# Patient Record
Sex: MALE | Race: WHITE | NOT HISPANIC OR LATINO
[De-identification: names, ages, dates, MRNs, and addresses within clinical notes are randomized per-mention and may not be internally consistent; named-entity substitution may affect disease eponyms.]

---

## 2017-02-21 ENCOUNTER — HOSPITAL (OUTPATIENT)
Dept: OTHER | Age: 67
End: 2017-02-21
Attending: OPHTHALMOLOGY

## 2017-02-28 ENCOUNTER — CHARTING TRANS (OUTPATIENT)
Dept: OTHER | Age: 67
End: 2017-02-28

## 2017-03-07 ENCOUNTER — CHARTING TRANS (OUTPATIENT)
Dept: OTHER | Age: 67
End: 2017-03-07

## 2021-07-13 ENCOUNTER — OFFICE VISIT (OUTPATIENT)
Dept: INTERNAL MEDICINE CLINIC | Facility: CLINIC | Age: 71
End: 2021-07-13
Payer: MEDICARE

## 2021-07-13 VITALS
SYSTOLIC BLOOD PRESSURE: 124 MMHG | HEART RATE: 58 BPM | TEMPERATURE: 99 F | WEIGHT: 264 LBS | RESPIRATION RATE: 18 BRPM | BODY MASS INDEX: 36.15 KG/M2 | HEIGHT: 71.5 IN | DIASTOLIC BLOOD PRESSURE: 76 MMHG | OXYGEN SATURATION: 97 %

## 2021-07-13 DIAGNOSIS — I71.4 ABDOMINAL AORTIC ANEURYSM (AAA) WITHOUT RUPTURE (HCC): ICD-10-CM

## 2021-07-13 DIAGNOSIS — Z12.5 PROSTATE CANCER SCREENING: ICD-10-CM

## 2021-07-13 DIAGNOSIS — M19.041 PRIMARY OSTEOARTHRITIS OF RIGHT HAND: ICD-10-CM

## 2021-07-13 DIAGNOSIS — G47.33 OBSTRUCTIVE SLEEP APNEA SYNDROME: ICD-10-CM

## 2021-07-13 DIAGNOSIS — I48.0 PAROXYSMAL ATRIAL FIBRILLATION (HCC): ICD-10-CM

## 2021-07-13 DIAGNOSIS — E78.2 MIXED HYPERLIPIDEMIA: ICD-10-CM

## 2021-07-13 DIAGNOSIS — Z00.00 ANNUAL PHYSICAL EXAM: Primary | ICD-10-CM

## 2021-07-13 DIAGNOSIS — I25.10 ATHEROSCLEROSIS OF NATIVE CORONARY ARTERY OF NATIVE HEART WITHOUT ANGINA PECTORIS: ICD-10-CM

## 2021-07-13 DIAGNOSIS — Z01.84 IMMUNITY STATUS TESTING: ICD-10-CM

## 2021-07-13 DIAGNOSIS — Z12.11 COLON CANCER SCREENING: ICD-10-CM

## 2021-07-13 DIAGNOSIS — I10 ESSENTIAL HYPERTENSION: ICD-10-CM

## 2021-07-13 DIAGNOSIS — Z00.00 ENCOUNTER FOR ANNUAL HEALTH EXAMINATION: ICD-10-CM

## 2021-07-13 DIAGNOSIS — H53.9 VISION CHANGES: ICD-10-CM

## 2021-07-13 DIAGNOSIS — M17.0 PRIMARY OSTEOARTHRITIS OF BOTH KNEES: ICD-10-CM

## 2021-07-13 PROBLEM — I71.40 ABDOMINAL AORTIC ANEURYSM (AAA): Status: ACTIVE | Noted: 2017-01-01

## 2021-07-13 PROBLEM — E78.5 HYPERLIPIDEMIA: Status: ACTIVE | Noted: 2020-03-05

## 2021-07-13 PROBLEM — I71.40 ABDOMINAL AORTIC ANEURYSM (AAA) (HCC): Status: ACTIVE | Noted: 2017-01-01

## 2021-07-13 PROCEDURE — G0439 PPPS, SUBSEQ VISIT: HCPCS | Performed by: INTERNAL MEDICINE

## 2021-07-13 RX ORDER — TRAMADOL HYDROCHLORIDE 50 MG/1
50 TABLET ORAL EVERY 6 HOURS PRN
COMMUNITY
Start: 2021-06-15 | End: 2022-01-11

## 2021-07-13 RX ORDER — ZALEPLON 5 MG/1
5 CAPSULE ORAL NIGHTLY PRN
COMMUNITY
Start: 2021-04-19

## 2021-07-13 RX ORDER — MELOXICAM 15 MG/1
15 TABLET ORAL DAILY
COMMUNITY
Start: 2020-12-31

## 2021-07-13 RX ORDER — HYDRALAZINE HYDROCHLORIDE 25 MG/1
25 TABLET, FILM COATED ORAL 3 TIMES DAILY
COMMUNITY
Start: 2021-06-30

## 2021-07-13 RX ORDER — ATORVASTATIN CALCIUM 40 MG/1
40 TABLET, FILM COATED ORAL NIGHTLY
COMMUNITY
Start: 2021-05-26

## 2021-07-13 RX ORDER — HYDROCHLOROTHIAZIDE 50 MG/1
50 TABLET ORAL DAILY
COMMUNITY
Start: 2019-08-29

## 2021-07-13 RX ORDER — LISINOPRIL 40 MG/1
40 TABLET ORAL NIGHTLY
COMMUNITY
Start: 2019-08-29

## 2021-07-13 NOTE — PROGRESS NOTES
HPI:   Ken Gipson is a 73year old male who presents for a Medicare Subsequent Annual Wellness visit (Pt already had Initial Annual Wellness).     HPI:  Here for AWV  New to me  Recent moved from [facility]  Has no complaints  Needs to establish with ca patient and Family/surrogate (if present), and forms available to patient in AVS     He does NOT have a Power of  for [employer] on file in [facility]    Advance care planning including the explanation and discussion of advance directives standard forms daily.  hydrochlorothiazide 50 MG Oral Tab, Take 50 mg by mouth daily. apixaban 5 MG Oral Tab, Take by mouth 2 (two) times daily. hydrALAzine HCl 25 MG Oral Tab,   Meloxicam 15 MG Oral Tab, Take 15 mg by mouth daily.   Zaleplon 5 MG Oral Cap, Take 5 mg by Head:  Normocephalic, without obvious abnormality, atraumatic   Eyes:  PERRL, conjunctiva/corneas clear, EOM's intact, both eyes   Ears:  Normal TM's and external ear canals, both ears   Nose: Nares normal, septum midline, mucosa normal, no drainage or s exam    Primary osteoarthritis of right hand    Paroxysmal atrial fibrillation (HCC)  -     CARDIO - INTERNAL    Abdominal aortic aneurysm (AAA) without rupture (HCC)    Atherosclerosis of native coronary artery of native heart without angina pectoris  - lifestyle, and exercise. Return in about 6 months (around 1/13/2022).      Alma Koo MD, 7/13/2021     General Health     In the past six months, have you lost more than 10 pounds without trying?: 1 - Yes  Has your appetite been poor?: No  How does t -    Colonoscopy Never done    Flexible Sigmoidoscopy   Covered every 4 years -    Fecal Occult Blood Test Covered annually -   Prostate Cancer Screening    Prostate-Specific Antigen (PSA) Annually No results found for: PSA  PSA Never done   Immunizations

## 2021-07-13 NOTE — PATIENT INSTRUCTIONS
Risk Factors for Heart Disease   Heart disease includes coronary artery disease which involves damage to the heart arteries. It also includes congestive heart failure and other heart issues.  The coronary arteries provide the oxygen your heart needs to pu partially hydrogenated oils    Replace less healthy foods by eating a diet with a lot of:   · Fruits  · Legumes  · Vegetables  · Whole grains  · Nuts  · Lean fish or lean animal protein    Drinking too much alcohol also raises the risk for heart disease.  I risk of heart disease, stroke, and diabetes.  You have metabolic syndrome if you have 3 or more of these:   · Low HDL cholesterol  · High triglycerides  · High blood pressure  · High blood sugar  · Extra weight around the waist    Diabetes  Diabetes occurs intended as a substitute for professional medical care. Always follow your healthcare professional's instructions.         Jose A Llanos's SCREENING SCHEDULE   Tests on this list are recommended by your physician but may not be covered, or covered at this fr covered once after 65 Prevnar 13: 08/22/2017    [id number]: 09/28/2018     No recommendations at this time    Hepatitis B One screening covered for patients with certain risk factors   -  No recommendations at this time    Tetanus Toxoid Not covered by Me

## 2021-07-19 ENCOUNTER — LAB ENCOUNTER (OUTPATIENT)
Dept: LAB | Age: 71
End: 2021-07-19
Attending: INTERNAL MEDICINE
Payer: MEDICARE

## 2021-07-19 ENCOUNTER — TELEPHONE (OUTPATIENT)
Dept: ORTHOPEDICS CLINIC | Facility: CLINIC | Age: 71
End: 2021-07-19

## 2021-07-19 DIAGNOSIS — M25.562 PAIN IN BOTH KNEES, UNSPECIFIED CHRONICITY: Primary | ICD-10-CM

## 2021-07-19 DIAGNOSIS — M25.561 PAIN IN BOTH KNEES, UNSPECIFIED CHRONICITY: Primary | ICD-10-CM

## 2021-07-19 DIAGNOSIS — Z12.5 PROSTATE CANCER SCREENING: ICD-10-CM

## 2021-07-19 DIAGNOSIS — I10 ESSENTIAL HYPERTENSION: ICD-10-CM

## 2021-07-19 DIAGNOSIS — Z01.84 IMMUNITY STATUS TESTING: ICD-10-CM

## 2021-07-19 DIAGNOSIS — E78.2 MIXED HYPERLIPIDEMIA: ICD-10-CM

## 2021-07-19 PROBLEM — Z78.9 HEPATITIS C ANTIBODY TEST NEGATIVE: Status: ACTIVE | Noted: 2021-07-19

## 2021-07-19 PROBLEM — E03.8 SUBCLINICAL HYPOTHYROIDISM: Status: ACTIVE | Noted: 2021-07-19

## 2021-07-19 LAB
ALBUMIN SERPL-MCNC: 3.9 G/DL (ref 3.4–5)
ALBUMIN/GLOB SERPL: 1.3 {RATIO} (ref 1–2)
ALP LIVER SERPL-CCNC: 61 U/L
ALT SERPL-CCNC: 40 U/L
ANION GAP SERPL CALC-SCNC: 3 MMOL/L (ref 0–18)
AST SERPL-CCNC: 29 U/L (ref 15–37)
BASOPHILS # BLD AUTO: 0.04 X10(3) UL (ref 0–0.2)
BASOPHILS NFR BLD AUTO: 0.7 %
BILIRUB SERPL-MCNC: 0.9 MG/DL (ref 0.1–2)
BILIRUB UR QL STRIP.AUTO: NEGATIVE
BUN BLD-MCNC: 24 MG/DL (ref 7–18)
BUN/CREAT SERPL: 26.1 (ref 10–20)
CALCIUM BLD-MCNC: 8.7 MG/DL (ref 8.5–10.1)
CHLORIDE SERPL-SCNC: 106 MMOL/L (ref 98–112)
CHOLEST SMN-MCNC: 123 MG/DL (ref ?–200)
CLARITY UR REFRACT.AUTO: CLEAR
CO2 SERPL-SCNC: 30 MMOL/L (ref 21–32)
COLOR UR AUTO: YELLOW
COMPLEXED PSA SERPL-MCNC: 0.43 NG/ML (ref ?–4)
CREAT BLD-MCNC: 0.92 MG/DL
DEPRECATED RDW RBC AUTO: 42.7 FL (ref 35.1–46.3)
EOSINOPHIL # BLD AUTO: 0.3 X10(3) UL (ref 0–0.7)
EOSINOPHIL NFR BLD AUTO: 5.2 %
ERYTHROCYTE [DISTWIDTH] IN BLOOD BY AUTOMATED COUNT: 12.5 % (ref 11–15)
GLOBULIN PLAS-MCNC: 3.1 G/DL (ref 2.8–4.4)
GLUCOSE BLD-MCNC: 96 MG/DL (ref 70–99)
GLUCOSE UR STRIP.AUTO-MCNC: NEGATIVE MG/DL
HCT VFR BLD AUTO: 43.1 %
HCV AB SERPL QL IA: NONREACTIVE
HDLC SERPL-MCNC: 54 MG/DL (ref 40–59)
HGB BLD-MCNC: 14.9 G/DL
IMM GRANULOCYTES # BLD AUTO: 0.01 X10(3) UL (ref 0–1)
IMM GRANULOCYTES NFR BLD: 0.2 %
KETONES UR STRIP.AUTO-MCNC: NEGATIVE MG/DL
LDLC SERPL CALC-MCNC: 55 MG/DL (ref ?–100)
LEUKOCYTE ESTERASE UR QL STRIP.AUTO: NEGATIVE
LYMPHOCYTES # BLD AUTO: 2.3 X10(3) UL (ref 1–4)
LYMPHOCYTES NFR BLD AUTO: 39.7 %
M PROTEIN MFR SERPL ELPH: 7 G/DL (ref 6.4–8.2)
MCH RBC QN AUTO: 32 PG (ref 26–34)
MCHC RBC AUTO-ENTMCNC: 34.6 G/DL (ref 31–37)
MCV RBC AUTO: 92.5 FL
MONOCYTES # BLD AUTO: 0.85 X10(3) UL (ref 0.1–1)
MONOCYTES NFR BLD AUTO: 14.7 %
NEUTROPHILS # BLD AUTO: 2.3 X10 (3) UL (ref 1.5–7.7)
NEUTROPHILS # BLD AUTO: 2.3 X10(3) UL (ref 1.5–7.7)
NEUTROPHILS NFR BLD AUTO: 39.5 %
NITRITE UR QL STRIP.AUTO: NEGATIVE
NONHDLC SERPL-MCNC: 69 MG/DL (ref ?–130)
OSMOLALITY SERPL CALC.SUM OF ELEC: 292 MOSM/KG (ref 275–295)
PATIENT FASTING Y/N/NP: YES
PATIENT FASTING Y/N/NP: YES
PH UR STRIP.AUTO: 6 [PH] (ref 5–8)
PLATELET # BLD AUTO: 155 10(3)UL (ref 150–450)
POTASSIUM SERPL-SCNC: 3.9 MMOL/L (ref 3.5–5.1)
PROT UR STRIP.AUTO-MCNC: NEGATIVE MG/DL
RBC # BLD AUTO: 4.66 X10(6)UL
RBC UR QL AUTO: NEGATIVE
SODIUM SERPL-SCNC: 139 MMOL/L (ref 136–145)
SP GR UR STRIP.AUTO: 1.03 (ref 1–1.03)
T4 FREE SERPL-MCNC: 0.8 NG/DL (ref 0.8–1.7)
TRIGL SERPL-MCNC: 69 MG/DL (ref 30–149)
TSI SER-ACNC: 4.02 MIU/ML (ref 0.36–3.74)
UROBILINOGEN UR STRIP.AUTO-MCNC: <2 MG/DL
VLDLC SERPL CALC-MCNC: 10 MG/DL (ref 0–30)
WBC # BLD AUTO: 5.8 X10(3) UL (ref 4–11)

## 2021-07-19 PROCEDURE — 85025 COMPLETE CBC W/AUTO DIFF WBC: CPT

## 2021-07-19 PROCEDURE — 82088 ASSAY OF ALDOSTERONE: CPT

## 2021-07-19 PROCEDURE — 84244 ASSAY OF RENIN: CPT

## 2021-07-19 PROCEDURE — 84443 ASSAY THYROID STIM HORMONE: CPT

## 2021-07-19 PROCEDURE — 36415 COLL VENOUS BLD VENIPUNCTURE: CPT

## 2021-07-19 PROCEDURE — 84439 ASSAY OF FREE THYROXINE: CPT

## 2021-07-19 PROCEDURE — 80061 LIPID PANEL: CPT

## 2021-07-19 PROCEDURE — 81001 URINALYSIS AUTO W/SCOPE: CPT

## 2021-07-19 PROCEDURE — 80053 COMPREHEN METABOLIC PANEL: CPT

## 2021-07-19 PROCEDURE — 86803 HEPATITIS C AB TEST: CPT

## 2021-07-19 NOTE — TELEPHONE ENCOUNTER
Patient has an appointment scheduled with Dr. Rhett Looney on 8/16 for bilateral knee issue. Will patient need imaging prior?

## 2021-07-21 ENCOUNTER — HOSPITAL ENCOUNTER (OUTPATIENT)
Dept: GENERAL RADIOLOGY | Age: 71
Discharge: HOME | End: 2021-07-21
Attending: ORTHOPAEDIC SURGERY
Payer: MEDICARE

## 2021-07-21 DIAGNOSIS — M25.562 PAIN IN BOTH KNEES, UNSPECIFIED CHRONICITY: ICD-10-CM

## 2021-07-21 DIAGNOSIS — M25.561 PAIN IN BOTH KNEES, UNSPECIFIED CHRONICITY: ICD-10-CM

## 2021-07-21 PROCEDURE — 73564 X-RAY EXAM KNEE 4 OR MORE: CPT | Performed by: ORTHOPAEDIC SURGERY

## 2021-07-29 LAB
ALDOSTERONE/RENIN ACTIVITY RATIO: 6.7 RATIO
ALDOSTERONE: 10.1 NG/DL
RENIN ACTIVITY: 1.5 NG/ML/HR

## 2021-08-16 ENCOUNTER — OFFICE VISIT (OUTPATIENT)
Dept: ORTHOPEDICS CLINIC | Facility: CLINIC | Age: 71
End: 2021-08-16
Payer: MEDICARE

## 2021-08-16 DIAGNOSIS — M17.0 PRIMARY OSTEOARTHRITIS OF BOTH KNEES: Primary | ICD-10-CM

## 2021-08-16 PROCEDURE — 99203 OFFICE O/P NEW LOW 30 MIN: CPT | Performed by: ORTHOPAEDIC SURGERY

## 2021-08-16 RX ORDER — VIT A/VIT C/VIT E/ZINC/COPPER 2148-113
TABLET ORAL
COMMUNITY

## 2021-08-16 NOTE — PROGRESS NOTES
[facility] New Patient Note    CC: Patient presents with:  Knee Pain: Patient is here today for bilateral knee pain, that has been present for about 10-15 years now had knee scope on right knee and orthovisc injections bilateral.       HPI: The SWELLING  Penicillins             SWELLING  Rivaroxaban             HIVES, ITCHING    Comment:Hives on legs             Hives on legs             Hives on legs             Hives on legs  Vancomycin              ITCHING, HALLUCINATION, OTHER (SEE is seen. Assessment/Diagnoses:  Diagnoses and all orders for this visit:    Primary osteoarthritis of both knees  -     [facility] FOR [facility] CARE AUTH        Plan:  I reviewed imaging and exam findings with the patient.   He presents with bello

## 2021-08-19 ENCOUNTER — TELEPHONE (OUTPATIENT)
Dept: ORTHOPEDICS CLINIC | Facility: CLINIC | Age: 71
End: 2021-08-19

## 2021-08-19 NOTE — TELEPHONE ENCOUNTER
Called and spoke with Antonette Banks to inform him that we were able to get him approved for monovisc  through his insurance. I also offer to get him set up with an appointment to have the procedure done.  I was able to get him in on 8/30/21 @10 am.

## 2021-08-25 ENCOUNTER — HOSPITAL ENCOUNTER (EMERGENCY)
Age: 71
Discharge: HOME | End: 2021-08-25
Attending: EMERGENCY MEDICINE
Payer: MEDICARE

## 2021-08-25 ENCOUNTER — APPOINTMENT (OUTPATIENT)
Dept: GENERAL RADIOLOGY | Age: 71
End: 2021-08-25
Attending: EMERGENCY MEDICINE
Payer: MEDICARE

## 2021-08-25 VITALS
HEART RATE: 40 BPM | OXYGEN SATURATION: 98 % | HEIGHT: 74 IN | TEMPERATURE: 97 F | WEIGHT: 265 LBS | DIASTOLIC BLOOD PRESSURE: 64 MMHG | BODY MASS INDEX: 34.01 KG/M2 | RESPIRATION RATE: 16 BRPM | SYSTOLIC BLOOD PRESSURE: 151 MMHG

## 2021-08-25 DIAGNOSIS — M54.50 ACUTE BILATERAL LOW BACK PAIN WITHOUT SCIATICA: Primary | ICD-10-CM

## 2021-08-25 DIAGNOSIS — K42.9 UMBILICAL HERNIA WITHOUT OBSTRUCTION AND WITHOUT GANGRENE: ICD-10-CM

## 2021-08-25 LAB
ALBUMIN SERPL-MCNC: 4 G/DL (ref 3.4–5)
ALBUMIN/GLOB SERPL: 1.2 {RATIO} (ref 1–2)
ALP LIVER SERPL-CCNC: 74 U/L
ALT SERPL-CCNC: 35 U/L
ANION GAP SERPL CALC-SCNC: 2 MMOL/L (ref 0–18)
AST SERPL-CCNC: 25 U/L (ref 15–37)
BASOPHILS # BLD AUTO: 0.03 X10(3) UL (ref 0–0.2)
BASOPHILS NFR BLD AUTO: 0.5 %
BILIRUB SERPL-MCNC: 0.6 MG/DL (ref 0.1–2)
BILIRUB UR QL STRIP.AUTO: NEGATIVE
BUN BLD-MCNC: 28 MG/DL (ref 7–18)
CALCIUM BLD-MCNC: 8.9 MG/DL (ref 8.5–10.1)
CHLORIDE SERPL-SCNC: 106 MMOL/L (ref 98–112)
CLARITY UR REFRACT.AUTO: CLEAR
CO2 SERPL-SCNC: 29 MMOL/L (ref 21–32)
COLOR UR AUTO: YELLOW
CREAT BLD-MCNC: 1.07 MG/DL
EOSINOPHIL # BLD AUTO: 0.31 X10(3) UL (ref 0–0.7)
EOSINOPHIL NFR BLD AUTO: 5.5 %
ERYTHROCYTE [DISTWIDTH] IN BLOOD BY AUTOMATED COUNT: 12.2 %
GLOBULIN PLAS-MCNC: 3.4 G/DL (ref 2.8–4.4)
GLUCOSE BLD-MCNC: 89 MG/DL (ref 70–99)
GLUCOSE UR STRIP.AUTO-MCNC: NEGATIVE MG/DL
HCT VFR BLD AUTO: 44.1 %
HGB BLD-MCNC: 15 G/DL
IMM GRANULOCYTES # BLD AUTO: 0.01 X10(3) UL (ref 0–1)
IMM GRANULOCYTES NFR BLD: 0.2 %
KETONES UR STRIP.AUTO-MCNC: NEGATIVE MG/DL
LEUKOCYTE ESTERASE UR QL STRIP.AUTO: NEGATIVE
LIPASE SERPL-CCNC: 377 U/L (ref 73–393)
LYMPHOCYTES # BLD AUTO: 1.94 X10(3) UL (ref 1–4)
LYMPHOCYTES NFR BLD AUTO: 34.6 %
M PROTEIN MFR SERPL ELPH: 7.4 G/DL (ref 6.4–8.2)
MCH RBC QN AUTO: 31.4 PG (ref 26–34)
MCHC RBC AUTO-ENTMCNC: 34 G/DL (ref 31–37)
MCV RBC AUTO: 92.5 FL
MONOCYTES # BLD AUTO: 0.78 X10(3) UL (ref 0.1–1)
MONOCYTES NFR BLD AUTO: 13.9 %
NEUTROPHILS # BLD AUTO: 2.53 X10 (3) UL (ref 1.5–7.7)
NEUTROPHILS # BLD AUTO: 2.53 X10(3) UL (ref 1.5–7.7)
NEUTROPHILS NFR BLD AUTO: 45.3 %
NITRITE UR QL STRIP.AUTO: NEGATIVE
OSMOLALITY SERPL CALC.SUM OF ELEC: 289 MOSM/KG (ref 275–295)
PH UR STRIP.AUTO: 6.5 [PH] (ref 5–8)
PLATELET # BLD AUTO: 157 10(3)UL (ref 150–450)
POTASSIUM SERPL-SCNC: 4.3 MMOL/L (ref 3.5–5.1)
PROT UR STRIP.AUTO-MCNC: NEGATIVE MG/DL
RBC # BLD AUTO: 4.77 X10(6)UL
RBC UR QL AUTO: NEGATIVE
SODIUM SERPL-SCNC: 137 MMOL/L (ref 136–145)
SP GR UR STRIP.AUTO: 1.02 (ref 1–1.03)
UROBILINOGEN UR STRIP.AUTO-MCNC: 0.2 MG/DL
WBC # BLD AUTO: 5.6 X10(3) UL (ref 4–11)

## 2021-08-25 PROCEDURE — 85025 COMPLETE CBC W/AUTO DIFF WBC: CPT | Performed by: EMERGENCY MEDICINE

## 2021-08-25 PROCEDURE — 80053 COMPREHEN METABOLIC PANEL: CPT | Performed by: EMERGENCY MEDICINE

## 2021-08-25 PROCEDURE — 36415 COLL VENOUS BLD VENIPUNCTURE: CPT

## 2021-08-25 PROCEDURE — 72100 X-RAY EXAM L-S SPINE 2/3 VWS: CPT | Performed by: EMERGENCY MEDICINE

## 2021-08-25 PROCEDURE — 99284 EMERGENCY DEPT VISIT MOD MDM: CPT

## 2021-08-25 PROCEDURE — 81003 URINALYSIS AUTO W/O SCOPE: CPT | Performed by: EMERGENCY MEDICINE

## 2021-08-25 PROCEDURE — 83690 ASSAY OF LIPASE: CPT | Performed by: EMERGENCY MEDICINE

## 2021-08-25 RX ORDER — CYCLOBENZAPRINE HCL 5 MG
5 TABLET ORAL 3 TIMES DAILY PRN
Qty: 20 TABLET | Refills: 0 | Status: SHIPPED | OUTPATIENT
Start: 2021-08-25 | End: 2021-09-01

## 2021-08-25 NOTE — ED PROVIDER NOTES
Patient Seen in: THE [facility] In [place]      History   Patient presents with:  Abdominal Pain    Stated Complaint: abd pain     HPI/Subjective:   HPI    72-year-old male presents to the emergency department complaining of low back pain rate and rhythm. Abdomen oropharynx is normal.  Neck: No adenopathy or thyromegaly. Lungs are clear to auscultation. Is nontender. There is evidence of a reducible umbilical hernia. Nontender. Extremities are atraumatic.   Back: Some pain with movemen L5-S1. There is mild anterior spondylolisthesis L4 on L5 consistent degenerative etiology numerous endplate osteophytes are noted. No acute fracture.  DISC SPACES:  There is multiple level degenerative disc disease involving all of the lumbar disc spaces

## 2021-08-25 NOTE — ED INITIAL ASSESSMENT (HPI)
abd pain last week states he felt a \"bubble\" then Saturday c/o pain radiating to back - denies n/v

## 2021-08-30 ENCOUNTER — OFFICE VISIT (OUTPATIENT)
Dept: ORTHOPEDICS CLINIC | Facility: CLINIC | Age: 71
End: 2021-08-30
Payer: MEDICARE

## 2021-08-30 VITALS — HEIGHT: 74 IN | WEIGHT: 265 LBS | BODY MASS INDEX: 34.01 KG/M2

## 2021-08-30 DIAGNOSIS — M17.12 PRIMARY OSTEOARTHRITIS OF LEFT KNEE: ICD-10-CM

## 2021-08-30 DIAGNOSIS — M17.11 PRIMARY OSTEOARTHRITIS OF RIGHT KNEE: Primary | ICD-10-CM

## 2021-08-30 PROCEDURE — 20610 DRAIN/INJ JOINT/BURSA W/O US: CPT | Performed by: ORTHOPAEDIC SURGERY

## 2021-08-30 NOTE — PROGRESS NOTES
[facility] Follow-up Progress Note        Chief Complaint:  Right and left knee pain     History: The patient is a 73year old male who returns due to recurrent pain at the right and left knees.   The patient is diagnosed with osteoarthritis and improvement, the patient gave verbal consent to proceed. Using meticulous sterile technique I injected 4 cc of 1% Xylocaine at the lateral patellofemoral joint of the right and left knees in sequence for local anesthesia.   After attempted aspiration, I in

## 2021-09-08 ENCOUNTER — OFFICE VISIT (OUTPATIENT)
Dept: SURGERY | Facility: CLINIC | Age: 71
End: 2021-09-08
Payer: MEDICARE

## 2021-09-08 VITALS
BODY MASS INDEX: 34.01 KG/M2 | TEMPERATURE: 97 F | SYSTOLIC BLOOD PRESSURE: 153 MMHG | HEART RATE: 40 BPM | WEIGHT: 265 LBS | HEIGHT: 74 IN | DIASTOLIC BLOOD PRESSURE: 71 MMHG

## 2021-09-08 DIAGNOSIS — G47.33 OBSTRUCTIVE SLEEP APNEA SYNDROME: ICD-10-CM

## 2021-09-08 DIAGNOSIS — I10 ESSENTIAL HYPERTENSION: ICD-10-CM

## 2021-09-08 DIAGNOSIS — I71.4 ABDOMINAL AORTIC ANEURYSM (AAA) WITHOUT RUPTURE (HCC): ICD-10-CM

## 2021-09-08 DIAGNOSIS — K42.9 UMBILICAL HERNIA WITHOUT OBSTRUCTION AND WITHOUT GANGRENE: Primary | ICD-10-CM

## 2021-09-08 DIAGNOSIS — I48.0 PAROXYSMAL ATRIAL FIBRILLATION (HCC): ICD-10-CM

## 2021-09-08 PROCEDURE — 99204 OFFICE O/P NEW MOD 45 MIN: CPT | Performed by: COLON & RECTAL SURGERY

## 2021-09-08 NOTE — H&P
New Patient Visit Note       Active Problems      1. Umbilical hernia without obstruction and without gangrene    2. Abdominal aortic aneurysm (AAA) without rupture ([address]    3. Essential hypertension    4. Paroxysmal atrial fibrillation (HCC)    5.  Obstruc Alzheimer's     Social History    Tobacco Use      Smoking status: Former Smoker        Packs/day: 1.00        Years: 20.00        Pack years: 21        Quit date: 2001        Years since quittin.2      Smokeless tobacco: Never Used      Tobacc cardiac clearance and recommendations on stopping Eliquis.     The risks of hernia repair were explained and include but are not limited to intra-operative and post-operative bleeding, post-operative wound infection, hernia recurrence, injury to adjacent or

## 2021-09-09 ENCOUNTER — OFFICE VISIT (OUTPATIENT)
Dept: INTERNAL MEDICINE CLINIC | Facility: CLINIC | Age: 71
End: 2021-09-09
Payer: MEDICARE

## 2021-09-09 ENCOUNTER — TELEPHONE (OUTPATIENT)
Dept: SURGERY | Facility: CLINIC | Age: 71
End: 2021-09-09

## 2021-09-09 VITALS
OXYGEN SATURATION: 96 % | BODY MASS INDEX: 34.14 KG/M2 | HEART RATE: 52 BPM | TEMPERATURE: 98 F | HEIGHT: 74 IN | SYSTOLIC BLOOD PRESSURE: 134 MMHG | DIASTOLIC BLOOD PRESSURE: 70 MMHG | WEIGHT: 266 LBS | RESPIRATION RATE: 16 BRPM

## 2021-09-09 DIAGNOSIS — I71.4 ABDOMINAL AORTIC ANEURYSM (AAA) WITHOUT RUPTURE (HCC): ICD-10-CM

## 2021-09-09 DIAGNOSIS — K42.9 UMBILICAL HERNIA WITHOUT OBSTRUCTION OR GANGRENE: Primary | ICD-10-CM

## 2021-09-09 DIAGNOSIS — I10 ESSENTIAL HYPERTENSION: Primary | ICD-10-CM

## 2021-09-09 PROCEDURE — 99213 OFFICE O/P EST LOW 20 MIN: CPT | Performed by: INTERNAL MEDICINE

## 2021-09-09 NOTE — PROGRESS NOTES
HPI/Subjective:   Patient ID: Yael Squires is a 73year old male. HPI   Yael Squires is a 73year old male. HPI:   Patient presents for recheck of his hypertension.  Pt has been taking medications as instructed, no medication side effects, home BP mo Cataract    • Complex sleep apnea syndrome 2016   • Essential hypertension       Past Surgical History:   Procedure Laterality Date   • TONSILLECTOMY        Social History:    Social History    Tobacco Use      Smoking status: Former Smoker        Packs/da 40 MG Oral Tab Take 40 mg by mouth daily. • metoprolol tartrate 25 MG Oral Tab Take 25 mg by mouth 2 (two) times daily. • hydrochlorothiazide 50 MG Oral Tab Take 50 mg by mouth daily. • apixaban 5 MG Oral Tab Take by mouth 2 (two) times daily.

## 2021-09-10 ENCOUNTER — HOSPITAL ENCOUNTER (OUTPATIENT)
Dept: ULTRASOUND IMAGING | Age: 71
Discharge: HOME | End: 2021-09-10
Attending: INTERNAL MEDICINE
Payer: MEDICARE

## 2021-09-10 DIAGNOSIS — I71.4 ABDOMINAL AORTIC ANEURYSM (AAA) WITHOUT RUPTURE (HCC): ICD-10-CM

## 2021-09-10 PROCEDURE — 76706 US ABDL AORTA SCREEN AAA: CPT | Performed by: INTERNAL MEDICINE

## 2021-09-13 ENCOUNTER — TELEPHONE (OUTPATIENT)
Dept: INTERNAL MEDICINE CLINIC | Facility: CLINIC | Age: 71
End: 2021-09-13

## 2021-09-13 NOTE — TELEPHONE ENCOUNTER
----- Message from Ivory Ashraf MD sent at 9/10/2021 12:58 PM CDT -----  Ectasia of the abdominal aorta   Atheromatous changes of the aorta  Stable findings.  Cont statin and BP control and recheck US in 1 year

## 2021-09-30 ENCOUNTER — OFFICE VISIT (OUTPATIENT)
Dept: INTERNAL MEDICINE CLINIC | Facility: CLINIC | Age: 71
End: 2021-09-30
Payer: MEDICARE

## 2021-09-30 VITALS
TEMPERATURE: 99 F | DIASTOLIC BLOOD PRESSURE: 72 MMHG | HEART RATE: 50 BPM | BODY MASS INDEX: 34.65 KG/M2 | HEIGHT: 74 IN | SYSTOLIC BLOOD PRESSURE: 136 MMHG | OXYGEN SATURATION: 95 % | RESPIRATION RATE: 16 BRPM | WEIGHT: 270 LBS

## 2021-09-30 DIAGNOSIS — M17.0 PRIMARY OSTEOARTHRITIS OF BOTH KNEES: ICD-10-CM

## 2021-09-30 DIAGNOSIS — I48.0 PAROXYSMAL ATRIAL FIBRILLATION (HCC): ICD-10-CM

## 2021-09-30 DIAGNOSIS — I10 ESSENTIAL HYPERTENSION: ICD-10-CM

## 2021-09-30 DIAGNOSIS — G47.33 OBSTRUCTIVE SLEEP APNEA SYNDROME: ICD-10-CM

## 2021-09-30 DIAGNOSIS — Z01.818 PRE-OP EXAM: Primary | ICD-10-CM

## 2021-09-30 PROCEDURE — 99214 OFFICE O/P EST MOD 30 MIN: CPT | Performed by: NURSE PRACTITIONER

## 2021-09-30 NOTE — H&P
Maribeth Garibay is a 73year old year old male who presents for a pre-operative physical exam.  Patient is to have robotic umbilical hernia repair with mesh to be done by Dr. Kristin Du at [facility] on October 8, 2021.     HPI:    Umbilical hernia with some a temperature             Elevated temperature  Hydralazine             ITCHING    Comment:Red tablet pt is allergic to  PMH:  has a past medical history of Allergic rhinitis, Cataract, Complex sleep apnea syndrome (2016), and Essential hypertension.  He also file      Attends Anab[employer] Services: Not on file      Active Member of Clubs or Organizations: Not on file      Attends Club or Organization Meetings: Not on file      Marital Status: Not on file  Intimate Partner Violence:       Fear of Current or Ex-Par of the back  EXTREMITIES: no cyanosis, clubbing or edema  NEURO: Oriented times three, cranial nerves are intact, motor and sensory are grossly intact    ASSESSMENT AND PLAN:    Antelmo Hodgson is a 73year old male who presents for cardiac risk assessment pr

## 2021-10-01 RX ORDER — ACETAMINOPHEN 500 MG
1000 TABLET ORAL ONCE
Status: CANCELLED | OUTPATIENT
Start: 2021-10-01 | End: 2021-10-01

## 2021-10-01 RX ORDER — ACETAMINOPHEN 500 MG
1000 TABLET ORAL EVERY 6 HOURS PRN
COMMUNITY

## 2021-10-01 RX ORDER — ACETAMINOPHEN/DIPHENHYDRAMINE 500MG-25MG
TABLET ORAL AS NEEDED
COMMUNITY

## 2021-10-05 ENCOUNTER — TELEPHONE (OUTPATIENT)
Dept: INTERNAL MEDICINE CLINIC | Facility: CLINIC | Age: 71
End: 2021-10-05

## 2021-10-05 ENCOUNTER — LAB ENCOUNTER (OUTPATIENT)
Dept: LAB | Age: 71
End: 2021-10-05
Attending: NURSE PRACTITIONER
Payer: MEDICARE

## 2021-10-05 DIAGNOSIS — Z01.818 PRE-OP EXAM: ICD-10-CM

## 2021-10-05 NOTE — TELEPHONE ENCOUNTER
Cc:  Annual medical exam  History of present illness:  Marli is a healthy 80-year-old woman with past medical history of hypertension, hyperlipidemia and glaucoma who presents for an annual medical examination today.  She is feeling well.    6 months ago she stopped her Zocor because she was having some strange sensation without aches in her thighs. These symptoms resolved after stopping the Zocor.    She works out for an hour and half every morning and recently has noticed that she gets anterior thigh pain and she brittney for 45 minutes. This occurs after exercise not during exercise. It does not occur when she runs on the track or machine for 10 minutes. She has no weakness numbness or tingling.    Healthcare maintenance:   Due for annual mammography in July, positive family history of breast cancer in her sister. We agreed that at age 80 and having had a normal colonoscopy  at this time to stop doing colonoscopy. She is very happy about this.     Past Medical History:   Diagnosis Date     Cataract      Hyperlipidemia LDL goal < 130      Hypertension     white coat; home blood pressure monitoring 2016  average systolic blood pressure approximately 115.     Hypothyroidism      Ocular hypertension      Primary open-angle glaucoma     adv     Past Surgical History:   Procedure Laterality Date     CHOLECYSTECTOMY       LAPAROSCOPIC APPENDECTOMY       LASER IRIDOTOMY OD (RIGHT EYE)      RE/LE  pre      LASER IRIDOTOMY OS (LEFT EYE)  2010     SELECTIVE LASER TRABECULOPLASTY (SLT) OS (LEFT EYE)  2010    LE     TRABECULECTOMY, MITOMYCIN FILTER, COMBINED  1/10/2012    LE     ROS   ROS: 10 point ROS neg other than the symptoms noted above in the HPI.    Family History   Problem Relation Age of Onset     CANCER Mother 78     stomach and  at 80     Hypertension Mother      Glaucoma Mother      Breast Cancer Sister 38     still living     Hypertension Father      CEREBROVASCULAR DISEASE Father 52      EKG was not completed during pre op as it was not requested. Spoke with pt and nurse visit scheduled for tomorrow for EKG. Then will be faxed. Glaucoma Maternal Grandfather      Social History     Social History     Marital status:      Spouse name: N/A     Number of children: N/A     Years of education: N/A     Occupational History     Not on file.     Social History Main Topics     Smoking status: Never Smoker     Smokeless tobacco: Never Used     Alcohol use 0.0 oz/week     0 Standard drinks or equivalent per week      Comment: rare tequilla     Drug use: No     Sexual activity: Yes     Partners: Male      Comment: 1958     Other Topics Concern     Not on file     Social History Narrative    Homemaker has 5 children, 7 grand, 3 greatgrandHow much exercise per week? DailyHow much calcium per day? Supplement   How much caffeine per day? NoHow much vitamin D per day? SupplementDo you/your family wear seatbelts?  YesDo you/your family use safety helmets? NoDo you/your family use sunscreen? YesDo you/your family keep firearms in the home? YesDo you/your family have a smoke detector(s)? YesDo you feel safe in your home? YesHas anyone ever touched you in an unwanted manner? No Explain         How much exercise per week? 5     How much calcium per day? daily       How much caffeine per day? none    How much vitamin D per day? Supplement    Do you/your family wear seatbelts?  Yes    Do you/your family use safety helmets? No    Do you/your family use sunscreen? Yes    Do you/your family keep firearms in the home? Yes    Do you/your family have a smoke detector(s)? Yes        Do you feel safe in your home? Yes    Has anyone ever touched you in an unwanted manner? No     Explain     July 16, 2015 Lauren León LPN                 /82 (BP Location: Right arm, Patient Position: Chair, Cuff Size: Adult Small)  Pulse 57  Resp 20  Wt 59.2 kg (130 lb 9.6 oz)  SpO2 97%  BMI 24.68 kg/m2  Exam:  Constitutional: robust Piter healthy-appearing 80-year-old woman who appears 10 years younger than her stated age  Head: Normocephalic. No masses, lesions,  tenderness or abnormalities  Neck: Neck supple. No adenopathy. Thyroid symmetric, normal size,, Carotids without bruits.  ENT: ENT exam normal, no neck nodes or sinus tenderness  Cardiovascular: negative, PMI normal. No lifts, heaves, or thrills.  Pulses strong and bilaterally symmetric and the femoral arteries, dorsalis pedis and posterior tibialis pulses bilaterally. RRR. No murmurs, clicks gallops or rub  Respiratory: negative, Percussion normal. Good diaphragmatic excursion. Lungs clear  Gastrointestinal: Abdomen soft, non-tender. BS normal. No masses, organomegaly  : Deferred  Musculoskeletal: extremities normal- no gross deformities noted, gait normal and normal muscle tone  Skin: no suspicious lesions or rashes  Neurologic: Gait normal. Reflexes normal and symmetric. Sensation grossly WNL.  Psychiatric: mentation appears normal and affect normal/bright  Hematologic/Lymphatic/Immunologic: Normal cervical lymph nodes       ASSESSMENT  Healthy 80-year-old woman with well-controlled hypertension with whitecoat component, and history of hyperlipidemia for which statins have induced muscle pains. Our plan today will be to recheck her lipids and be very cautious about resuming statins in the future. If we have to, we will use pravastatin.    Exercise induced muscle cramps in the legs. No evidence of PAD by exam or history. I've asked her to reduce the growing from 45 minutes to 25-35 minutes per day. We'll see how that works.    Healthcare maintenance: See below    Marli was seen today for physical and recheck medication.    Diagnoses and all orders for this visit:    Health care maintenance  -     Lipid panel reflex to direct LDL; Future  -     Creatinine; Future  -     Potassium; Future    Hypothyroidism, unspecified type  -     TSH with free T4 reflex; Standing    Encounter for screening mammogram for breast cancer  -     Mammogram, routine screening; Future    Osteoporosis  -     Dexa hip/pelvis/spine*;  Future

## 2021-10-06 ENCOUNTER — TELEPHONE (OUTPATIENT)
Dept: INTERNAL MEDICINE CLINIC | Facility: CLINIC | Age: 71
End: 2021-10-06

## 2021-10-06 ENCOUNTER — NURSE ONLY (OUTPATIENT)
Dept: INTERNAL MEDICINE CLINIC | Facility: CLINIC | Age: 71
End: 2021-10-06
Payer: MEDICARE

## 2021-10-06 DIAGNOSIS — I10 ESSENTIAL HYPERTENSION: ICD-10-CM

## 2021-10-06 DIAGNOSIS — I48.0 PAROXYSMAL ATRIAL FIBRILLATION (HCC): ICD-10-CM

## 2021-10-06 DIAGNOSIS — Z01.818 PRE-OP EXAM: Primary | ICD-10-CM

## 2021-10-06 PROCEDURE — 93000 ELECTROCARDIOGRAM COMPLETE: CPT | Performed by: NURSE PRACTITIONER

## 2021-10-06 NOTE — TELEPHONE ENCOUNTER
[facility] Pre-admission testing calling to request copy of EKG. Please fax to [phone].

## 2021-10-07 ENCOUNTER — HOSPITAL ENCOUNTER (EMERGENCY)
Age: 71
Discharge: HOME | End: 2021-10-07
Attending: EMERGENCY MEDICINE
Payer: MEDICARE

## 2021-10-07 VITALS
TEMPERATURE: 98 F | OXYGEN SATURATION: 98 % | DIASTOLIC BLOOD PRESSURE: 41 MMHG | RESPIRATION RATE: 18 BRPM | HEART RATE: 45 BPM | WEIGHT: 270 LBS | BODY MASS INDEX: 34.65 KG/M2 | HEIGHT: 74 IN | SYSTOLIC BLOOD PRESSURE: 104 MMHG

## 2021-10-07 DIAGNOSIS — K52.9 GASTROENTERITIS: Primary | ICD-10-CM

## 2021-10-07 PROCEDURE — 87427 SHIGA-LIKE TOXIN AG IA: CPT | Performed by: EMERGENCY MEDICINE

## 2021-10-07 PROCEDURE — 85025 COMPLETE CBC W/AUTO DIFF WBC: CPT | Performed by: EMERGENCY MEDICINE

## 2021-10-07 PROCEDURE — 83690 ASSAY OF LIPASE: CPT | Performed by: EMERGENCY MEDICINE

## 2021-10-07 PROCEDURE — 93010 ELECTROCARDIOGRAM REPORT: CPT

## 2021-10-07 PROCEDURE — 99284 EMERGENCY DEPT VISIT MOD MDM: CPT

## 2021-10-07 PROCEDURE — 87046 STOOL CULTR AEROBIC BACT EA: CPT | Performed by: EMERGENCY MEDICINE

## 2021-10-07 PROCEDURE — 87045 FECES CULTURE AEROBIC BACT: CPT | Performed by: EMERGENCY MEDICINE

## 2021-10-07 PROCEDURE — 93005 ELECTROCARDIOGRAM TRACING: CPT

## 2021-10-07 PROCEDURE — 96360 HYDRATION IV INFUSION INIT: CPT

## 2021-10-07 PROCEDURE — 87493 C DIFF AMPLIFIED PROBE: CPT | Performed by: EMERGENCY MEDICINE

## 2021-10-07 PROCEDURE — 82272 OCCULT BLD FECES 1-3 TESTS: CPT | Performed by: EMERGENCY MEDICINE

## 2021-10-07 PROCEDURE — 80053 COMPREHEN METABOLIC PANEL: CPT | Performed by: EMERGENCY MEDICINE

## 2021-10-07 PROCEDURE — 84484 ASSAY OF TROPONIN QUANT: CPT | Performed by: EMERGENCY MEDICINE

## 2021-10-07 NOTE — ED PROVIDER NOTES
Patient Seen in: THE [facility] Emergency Department In [place]      History   Patient presents with:  Abdominal Pain    Stated Complaint:     Subjective:   HPI    51-year-old male with history of paroxysmal atrial fibrillation and hypertension who is schedul other systems reviewed and negative except as noted above.     Physical Exam     ED Triage Vitals [10/07/21 0642]   /50   Pulse 50   Resp 16   Temp 97.8 °F (36.6 °C)   Temp src Temporal   SpO2 97 %   O2 Device None (Room air)       Current:/41 Abnormality         Status                     ---------                               -----------         ------                     Stool Culture[[id number]]                                    In process                 Shigato treatment plan, warning signs and symptoms which should prompt immediate return. They expressed understanding of these instructions and agrees to the following plan provided.   They were given written discharge instructions and agrees to return for any con

## 2021-10-08 ENCOUNTER — HOSPITAL ENCOUNTER (OUTPATIENT)
Facility: HOSPITAL | Age: 71
Setting detail: HOSPITAL OUTPATIENT SURGERY
Discharge: HOME | End: 2021-10-08
Attending: COLON & RECTAL SURGERY | Admitting: COLON & RECTAL SURGERY
Payer: MEDICARE

## 2021-10-08 ENCOUNTER — ANESTHESIA EVENT (OUTPATIENT)
Dept: SURGERY | Facility: HOSPITAL | Age: 71
End: 2021-10-08
Payer: MEDICARE

## 2021-10-08 ENCOUNTER — ANESTHESIA (OUTPATIENT)
Dept: SURGERY | Facility: HOSPITAL | Age: 71
End: 2021-10-08
Payer: MEDICARE

## 2021-10-08 VITALS
WEIGHT: 265.19 LBS | RESPIRATION RATE: 20 BRPM | DIASTOLIC BLOOD PRESSURE: 78 MMHG | TEMPERATURE: 97 F | SYSTOLIC BLOOD PRESSURE: 153 MMHG | BODY MASS INDEX: 34.03 KG/M2 | HEIGHT: 74 IN | HEART RATE: 55 BPM | OXYGEN SATURATION: 95 %

## 2021-10-08 DIAGNOSIS — K42.9 UMBILICAL HERNIA WITHOUT OBSTRUCTION OR GANGRENE: ICD-10-CM

## 2021-10-08 PROCEDURE — 0WUF4JZ SUPPLEMENT ABDOMINAL WALL WITH SYNTHETIC SUBSTITUTE, PERCUTANEOUS ENDOSCOPIC APPROACH: ICD-10-PCS | Performed by: COLON & RECTAL SURGERY

## 2021-10-08 PROCEDURE — 49652 LAP VENT/ABD HERNIA REPAIR: CPT | Performed by: PHYSICIAN ASSISTANT

## 2021-10-08 PROCEDURE — 49652 LAP VENT/ABD HERNIA REPAIR: CPT | Performed by: COLON & RECTAL SURGERY

## 2021-10-08 PROCEDURE — 8E0W4CZ ROBOTIC ASSISTED PROCEDURE OF TRUNK REGION, PERCUTANEOUS ENDOSCOPIC APPROACH: ICD-10-PCS | Performed by: COLON & RECTAL SURGERY

## 2021-10-08 DEVICE — SYNECOR SURG MESH OVAL 15CM: Type: IMPLANTABLE DEVICE | Site: ABDOMEN | Status: FUNCTIONAL

## 2021-10-08 RX ORDER — IBUPROFEN 600 MG/1
600 TABLET ORAL ONCE AS NEEDED
Status: DISCONTINUED | OUTPATIENT
Start: 2021-10-08 | End: 2021-10-08

## 2021-10-08 RX ORDER — METOCLOPRAMIDE HYDROCHLORIDE 5 MG/ML
10 INJECTION INTRAMUSCULAR; INTRAVENOUS AS NEEDED
Status: DISCONTINUED | OUTPATIENT
Start: 2021-10-08 | End: 2021-10-08

## 2021-10-08 RX ORDER — SODIUM CHLORIDE, SODIUM LACTATE, POTASSIUM CHLORIDE, CALCIUM CHLORIDE 600; 310; 30; 20 MG/100ML; MG/100ML; MG/100ML; MG/100ML
INJECTION, SOLUTION INTRAVENOUS CONTINUOUS
Status: DISCONTINUED | OUTPATIENT
Start: 2021-10-08 | End: 2021-10-08

## 2021-10-08 RX ORDER — CLINDAMYCIN PHOSPHATE 900 MG/50ML
900 INJECTION INTRAVENOUS ONCE
Status: COMPLETED | OUTPATIENT
Start: 2021-10-08 | End: 2021-10-08

## 2021-10-08 RX ORDER — HEPARIN SODIUM 5000 [USP'U]/ML
5000 INJECTION, SOLUTION INTRAVENOUS; SUBCUTANEOUS ONCE
Status: COMPLETED | OUTPATIENT
Start: 2021-10-08 | End: 2021-10-08

## 2021-10-08 RX ORDER — NALOXONE HYDROCHLORIDE 0.4 MG/ML
80 INJECTION, SOLUTION INTRAMUSCULAR; INTRAVENOUS; SUBCUTANEOUS AS NEEDED
Status: DISCONTINUED | OUTPATIENT
Start: 2021-10-08 | End: 2021-10-08

## 2021-10-08 RX ORDER — DEXAMETHASONE SODIUM PHOSPHATE 4 MG/ML
4 VIAL (ML) INJECTION AS NEEDED
Status: DISCONTINUED | OUTPATIENT
Start: 2021-10-08 | End: 2021-10-08

## 2021-10-08 RX ORDER — METOCLOPRAMIDE HYDROCHLORIDE 5 MG/ML
INJECTION INTRAMUSCULAR; INTRAVENOUS
Status: COMPLETED
Start: 2021-10-08 | End: 2021-10-08

## 2021-10-08 RX ORDER — NEOSTIGMINE METHYLSULFATE 1 MG/ML
INJECTION INTRAVENOUS AS NEEDED
Status: DISCONTINUED | OUTPATIENT
Start: 2021-10-08 | End: 2021-10-08 | Stop reason: SURG

## 2021-10-08 RX ORDER — DEXAMETHASONE SODIUM PHOSPHATE 4 MG/ML
VIAL (ML) INJECTION AS NEEDED
Status: DISCONTINUED | OUTPATIENT
Start: 2021-10-08 | End: 2021-10-08 | Stop reason: SURG

## 2021-10-08 RX ORDER — HYDROMORPHONE HYDROCHLORIDE 1 MG/ML
0.4 INJECTION, SOLUTION INTRAMUSCULAR; INTRAVENOUS; SUBCUTANEOUS EVERY 5 MIN PRN
Status: DISCONTINUED | OUTPATIENT
Start: 2021-10-08 | End: 2021-10-08

## 2021-10-08 RX ORDER — HYDROCODONE BITARTRATE AND ACETAMINOPHEN 5; 325 MG/1; MG/1
1 TABLET ORAL AS NEEDED
Status: COMPLETED | OUTPATIENT
Start: 2021-10-08 | End: 2021-10-08

## 2021-10-08 RX ORDER — MIDAZOLAM HYDROCHLORIDE 1 MG/ML
INJECTION INTRAMUSCULAR; INTRAVENOUS AS NEEDED
Status: DISCONTINUED | OUTPATIENT
Start: 2021-10-08 | End: 2021-10-08 | Stop reason: SURG

## 2021-10-08 RX ORDER — GLYCOPYRROLATE 0.2 MG/ML
INJECTION, SOLUTION INTRAMUSCULAR; INTRAVENOUS AS NEEDED
Status: DISCONTINUED | OUTPATIENT
Start: 2021-10-08 | End: 2021-10-08 | Stop reason: SURG

## 2021-10-08 RX ORDER — LIDOCAINE HYDROCHLORIDE 10 MG/ML
INJECTION, SOLUTION EPIDURAL; INFILTRATION; INTRACAUDAL; PERINEURAL AS NEEDED
Status: DISCONTINUED | OUTPATIENT
Start: 2021-10-08 | End: 2021-10-08 | Stop reason: SURG

## 2021-10-08 RX ORDER — BUPIVACAINE HYDROCHLORIDE AND EPINEPHRINE 2.5; 5 MG/ML; UG/ML
INJECTION, SOLUTION EPIDURAL; INFILTRATION; INTRACAUDAL; PERINEURAL AS NEEDED
Status: DISCONTINUED | OUTPATIENT
Start: 2021-10-08 | End: 2021-10-08 | Stop reason: HOSPADM

## 2021-10-08 RX ORDER — ONDANSETRON 2 MG/ML
4 INJECTION INTRAMUSCULAR; INTRAVENOUS AS NEEDED
Status: DISCONTINUED | OUTPATIENT
Start: 2021-10-08 | End: 2021-10-08

## 2021-10-08 RX ORDER — KETOROLAC TROMETHAMINE 30 MG/ML
INJECTION, SOLUTION INTRAMUSCULAR; INTRAVENOUS AS NEEDED
Status: DISCONTINUED | OUTPATIENT
Start: 2021-10-08 | End: 2021-10-08 | Stop reason: SURG

## 2021-10-08 RX ORDER — ROCURONIUM BROMIDE 10 MG/ML
INJECTION, SOLUTION INTRAVENOUS AS NEEDED
Status: DISCONTINUED | OUTPATIENT
Start: 2021-10-08 | End: 2021-10-08 | Stop reason: SURG

## 2021-10-08 RX ORDER — HYDROMORPHONE HYDROCHLORIDE 1 MG/ML
INJECTION, SOLUTION INTRAMUSCULAR; INTRAVENOUS; SUBCUTANEOUS
Status: COMPLETED
Start: 2021-10-08 | End: 2021-10-08

## 2021-10-08 RX ORDER — ONDANSETRON 2 MG/ML
INJECTION INTRAMUSCULAR; INTRAVENOUS
Status: COMPLETED
Start: 2021-10-08 | End: 2021-10-08

## 2021-10-08 RX ORDER — OXYCODONE HYDROCHLORIDE 5 MG/1
5 TABLET ORAL EVERY 6 HOURS PRN
Qty: 15 TABLET | Refills: 0 | Status: SHIPPED | OUTPATIENT
Start: 2021-10-08 | End: 2022-01-11

## 2021-10-08 RX ORDER — HYDROCODONE BITARTRATE AND ACETAMINOPHEN 5; 325 MG/1; MG/1
2 TABLET ORAL AS NEEDED
Status: COMPLETED | OUTPATIENT
Start: 2021-10-08 | End: 2021-10-08

## 2021-10-08 RX ORDER — ONDANSETRON 2 MG/ML
INJECTION INTRAMUSCULAR; INTRAVENOUS AS NEEDED
Status: DISCONTINUED | OUTPATIENT
Start: 2021-10-08 | End: 2021-10-08 | Stop reason: SURG

## 2021-10-08 RX ADMIN — ROCURONIUM BROMIDE 10 MG: 10 INJECTION, SOLUTION INTRAVENOUS at 10:32:00

## 2021-10-08 RX ADMIN — ONDANSETRON 4 MG: 2 INJECTION INTRAMUSCULAR; INTRAVENOUS at 10:36:00

## 2021-10-08 RX ADMIN — SODIUM CHLORIDE, SODIUM LACTATE, POTASSIUM CHLORIDE, CALCIUM CHLORIDE: 600; 310; 30; 20 INJECTION, SOLUTION INTRAVENOUS at 12:53:00

## 2021-10-08 RX ADMIN — GLYCOPYRROLATE 0.4 MG: 0.2 INJECTION, SOLUTION INTRAMUSCULAR; INTRAVENOUS at 12:36:00

## 2021-10-08 RX ADMIN — LIDOCAINE HYDROCHLORIDE 50 MG: 10 INJECTION, SOLUTION EPIDURAL; INFILTRATION; INTRACAUDAL; PERINEURAL at 10:31:00

## 2021-10-08 RX ADMIN — NEOSTIGMINE METHYLSULFATE 4 MG: 1 INJECTION INTRAVENOUS at 12:36:00

## 2021-10-08 RX ADMIN — KETOROLAC TROMETHAMINE 15 MG: 30 INJECTION, SOLUTION INTRAMUSCULAR; INTRAVENOUS at 12:29:00

## 2021-10-08 RX ADMIN — SODIUM CHLORIDE, SODIUM LACTATE, POTASSIUM CHLORIDE, CALCIUM CHLORIDE: 600; 310; 30; 20 INJECTION, SOLUTION INTRAVENOUS at 10:36:00

## 2021-10-08 RX ADMIN — MIDAZOLAM HYDROCHLORIDE 2 MG: 1 INJECTION INTRAMUSCULAR; INTRAVENOUS at 10:25:00

## 2021-10-08 RX ADMIN — ROCURONIUM BROMIDE 20 MG: 10 INJECTION, SOLUTION INTRAVENOUS at 10:36:00

## 2021-10-08 RX ADMIN — DEXAMETHASONE SODIUM PHOSPHATE 4 MG: 4 MG/ML VIAL (ML) INJECTION at 10:36:00

## 2021-10-08 RX ADMIN — CLINDAMYCIN PHOSPHATE 900 MG: 900 INJECTION INTRAVENOUS at 10:33:00

## 2021-10-08 NOTE — ANESTHESIA POSTPROCEDURE EVALUATION
Sandra Moncada 43 Patient Status:  Hospital Outpatient Surgery   Age/Gender 73year old male MRN [id number]   [facility] SURGERY Attending Pamla Ahumada, MD   Hosp Day # 0 PCP Bret Camp MD       Anesthesia Post-op Not

## 2021-10-08 NOTE — ANESTHESIA PROCEDURE NOTES
Airway  Date/Time: 10/8/2021 10:34 AM  Urgency: elective      General Information and Staff    Patient location during procedure: OR  Anesthesiologist: Alexia Alfonso MD  Performed: anesthesiologist     Indications and Patient Condition  Indications for air

## 2021-10-08 NOTE — ANESTHESIA PREPROCEDURE EVALUATION
PRE-OP EVALUATION    Patient Name: Lashawn Ansari    Admit Diagnosis: Umbilical hernia without obstruction or gangrene [K42.9]    Pre-op Diagnosis: Umbilical hernia without obstruction or gangrene [K42.9]     ROBOTIC LAPAROSCOPIC UMBILICAL HERNIA REPAIR WITH HGB 16.8 10/07/2021    HCT 49.0 10/07/2021    MCV 90.9 10/07/2021    MCH 31.2 10/07/2021    MCHC 34.3 10/07/2021    RDW 12.5 10/07/2021    .0 10/07/2021     Lab Results   Component Value Date     10/07/2021    K 3.6 10/07/2021     10/07/

## 2021-10-15 ENCOUNTER — OFFICE VISIT (OUTPATIENT)
Dept: SURGERY | Facility: CLINIC | Age: 71
End: 2021-10-15

## 2021-10-15 VITALS — WEIGHT: 265 LBS | TEMPERATURE: 97 F | HEIGHT: 74 IN | BODY MASS INDEX: 34.01 KG/M2

## 2021-10-15 DIAGNOSIS — Z09 S/P UMBILICAL HERNIA REPAIR, FOLLOW-UP EXAM: Primary | ICD-10-CM

## 2021-10-15 PROCEDURE — 99024 POSTOP FOLLOW-UP VISIT: CPT | Performed by: STUDENT IN AN ORGANIZED HEALTH CARE EDUCATION/TRAINING PROGRAM

## 2021-10-15 NOTE — PROGRESS NOTES
Post Operative Visit Note       Active Problems  1.  S/P umbilical hernia repair, follow-up exam         Chief Complaint   Patient presents with:  Post-Op: POST OP - ROBOTIC LAPAROSCOPIC UMBILICAL HERNIA REPAIR WITH MESH W/ VKA ON 10/8 pt denies any pain or status:     Tobacco Use      Smoking status: Former Smoker        Packs/day: 1.00        Years: 20.00        Pack years: 21        Quit date: 2001        Years since quittin.2      Smokeless tobacco: Never Used      Tobacco comment: Quit 2 nosebleeds, sore throat and trouble swallowing. Respiratory: Negative for apnea, cough, shortness of breath and wheezing. Cardiovascular: Negative for chest pain, palpitations and leg swelling.    Gastrointestinal: Negative for abdominal distention, a Behavior normal.         Thought Content: Thought content normal.         Judgment: Judgment normal.             Assessment   S/P umbilical hernia repair, follow-up exam  (primary encounter diagnosis)      Plan   • Overall the patient is doing well today.

## 2022-01-11 ENCOUNTER — OFFICE VISIT (OUTPATIENT)
Dept: INTERNAL MEDICINE CLINIC | Facility: CLINIC | Age: 72
End: 2022-01-11
Payer: MEDICARE

## 2022-01-11 VITALS
RESPIRATION RATE: 16 BRPM | BODY MASS INDEX: 36.19 KG/M2 | HEART RATE: 53 BPM | OXYGEN SATURATION: 98 % | WEIGHT: 282 LBS | HEIGHT: 74 IN | TEMPERATURE: 98 F | DIASTOLIC BLOOD PRESSURE: 70 MMHG | SYSTOLIC BLOOD PRESSURE: 130 MMHG

## 2022-01-11 DIAGNOSIS — E78.2 MIXED HYPERLIPIDEMIA: ICD-10-CM

## 2022-01-11 DIAGNOSIS — I10 ESSENTIAL HYPERTENSION: Primary | ICD-10-CM

## 2022-01-11 DIAGNOSIS — I48.0 PAROXYSMAL ATRIAL FIBRILLATION (HCC): ICD-10-CM

## 2022-01-11 PROCEDURE — 99214 OFFICE O/P EST MOD 30 MIN: CPT | Performed by: INTERNAL MEDICINE

## 2022-01-11 RX ORDER — CHLORAL HYDRATE 500 MG
CAPSULE ORAL DAILY
COMMUNITY

## 2022-01-11 NOTE — PROGRESS NOTES
Subjective:   Patient ID: Yeny Syed is a 73year old male. HPI  Yeny Syed is a 73year old male. HPI:   Patient presents for recheck of his hypertension, HLD and PAF.  Pt has been taking medications as instructed, no medication side effects, ho hydrALAzine HCl 25 MG Oral Tab Take 25 mg by mouth 3 (three) times daily. • Meloxicam 15 MG Oral Tab Take 15 mg by mouth daily. • Zaleplon 5 MG Oral Cap Take 5 mg by mouth nightly as needed. • mupirocin 2 % External Ointment as needed.         P BS's,no masses, HSM or tenderness  EXTREMITIES: no cyanosis, clubbing or edema    ASSESSMENT AND PLAN:   Pt presents for a recheck of his hypertension, HLD and PAF which are  well controlled, no significant medication side effects noted.    afib is rate con allergic to, takes orange tab is             okay no problems    Objective:   Physical Exam    Assessment & Plan:   Essential hypertension  (primary encounter diagnosis)  Paroxysmal atrial fibrillation (HCC)  Mixed hyperlipidemia    No orders of the define

## 2022-01-11 NOTE — PATIENT INSTRUCTIONS
Low-Salt Choices  Eating salt (sodium) can make your body retain too much water. Extra water makes your heart work harder. Canned, packaged, and frozen foods are easy to prepare. But they are often high in sodium.  Here are some ideas for low-salt foods

## 2022-02-08 ENCOUNTER — LAB ENCOUNTER (OUTPATIENT)
Dept: LAB | Age: 72
End: 2022-02-08
Attending: INTERNAL MEDICINE
Payer: MEDICARE

## 2022-02-08 DIAGNOSIS — E78.5 HYPERLIPEMIA: ICD-10-CM

## 2022-02-08 DIAGNOSIS — I48.0 PAROXYSMAL ATRIAL FIBRILLATION (HCC): ICD-10-CM

## 2022-02-08 DIAGNOSIS — R00.1 SEVERE SINUS BRADYCARDIA: Primary | ICD-10-CM

## 2022-02-08 DIAGNOSIS — I10 ESSENTIAL HYPERTENSION, MALIGNANT: ICD-10-CM

## 2022-02-08 LAB
CHOLEST SERPL-MCNC: 131 MG/DL (ref ?–200)
FASTING PATIENT LIPID ANSWER: YES
HDLC SERPL-MCNC: 53 MG/DL (ref 40–59)
LDLC SERPL CALC-MCNC: 62 MG/DL (ref ?–100)
NONHDLC SERPL-MCNC: 78 MG/DL (ref ?–130)
TRIGL SERPL-MCNC: 79 MG/DL (ref 30–149)
VLDLC SERPL CALC-MCNC: 12 MG/DL (ref 0–30)

## 2022-02-08 PROCEDURE — 80061 LIPID PANEL: CPT

## 2022-02-08 PROCEDURE — 36415 COLL VENOUS BLD VENIPUNCTURE: CPT

## 2022-02-21 ENCOUNTER — MED REC SCAN ONLY (OUTPATIENT)
Dept: INTERNAL MEDICINE CLINIC | Facility: CLINIC | Age: 72
End: 2022-02-21

## 2022-03-01 ENCOUNTER — TELEPHONE (OUTPATIENT)
Dept: ORTHOPEDICS CLINIC | Facility: CLINIC | Age: 72
End: 2022-03-01

## 2022-03-29 ENCOUNTER — TELEPHONE (OUTPATIENT)
Dept: ORTHOPEDICS CLINIC | Facility: CLINIC | Age: 72
End: 2022-03-29

## 2022-03-29 DIAGNOSIS — M25.511 RIGHT SHOULDER PAIN, UNSPECIFIED CHRONICITY: Primary | ICD-10-CM

## 2022-03-29 NOTE — TELEPHONE ENCOUNTER
Established patient hasn't been seen for right shoulder/clicking and pain.  Please advise if imaging is needed

## 2022-03-29 NOTE — TELEPHONE ENCOUNTER
Received pt's medication please schedule BL MONOVISC procedure with NATO at the [facility] location    Thanks!

## 2022-03-30 ENCOUNTER — PATIENT MESSAGE (OUTPATIENT)
Dept: INTERNAL MEDICINE CLINIC | Facility: CLINIC | Age: 72
End: 2022-03-30

## 2022-03-30 NOTE — TELEPHONE ENCOUNTER
From: Yeny Syed  To: Lincoln Cast MD  Sent: 3/30/2022 12:53 PM CDT  Subject: 4th Covid Vacine Shot    Dr. Caroline Carranza:  Per the attached for your records I got my 4th Covid vaccine shot today / 2nd booster. If anything else needed, please let me know. Thanks.   Bessy Velazquez

## 2022-04-12 ENCOUNTER — OFFICE VISIT (OUTPATIENT)
Dept: INTERNAL MEDICINE CLINIC | Facility: CLINIC | Age: 72
End: 2022-04-12
Payer: MEDICARE

## 2022-04-12 ENCOUNTER — HOSPITAL ENCOUNTER (OUTPATIENT)
Dept: GENERAL RADIOLOGY | Age: 72
Discharge: HOME | End: 2022-04-12
Attending: INTERNAL MEDICINE
Payer: MEDICARE

## 2022-04-12 ENCOUNTER — PATIENT MESSAGE (OUTPATIENT)
Dept: INTERNAL MEDICINE CLINIC | Facility: CLINIC | Age: 72
End: 2022-04-12

## 2022-04-12 ENCOUNTER — TELEPHONE (OUTPATIENT)
Dept: INTERNAL MEDICINE CLINIC | Facility: CLINIC | Age: 72
End: 2022-04-12

## 2022-04-12 VITALS
SYSTOLIC BLOOD PRESSURE: 130 MMHG | WEIGHT: 286 LBS | HEIGHT: 74 IN | DIASTOLIC BLOOD PRESSURE: 74 MMHG | OXYGEN SATURATION: 97 % | HEART RATE: 56 BPM | RESPIRATION RATE: 16 BRPM | BODY MASS INDEX: 36.7 KG/M2 | TEMPERATURE: 98 F

## 2022-04-12 DIAGNOSIS — T46.4X5A COUGH DUE TO ACE INHIBITOR: Primary | ICD-10-CM

## 2022-04-12 DIAGNOSIS — R05.8 COUGH DUE TO ACE INHIBITOR: Primary | ICD-10-CM

## 2022-04-12 DIAGNOSIS — R05.9 COUGH: ICD-10-CM

## 2022-04-12 DIAGNOSIS — I10 ESSENTIAL HYPERTENSION: ICD-10-CM

## 2022-04-12 PROCEDURE — 99214 OFFICE O/P EST MOD 30 MIN: CPT | Performed by: INTERNAL MEDICINE

## 2022-04-12 PROCEDURE — 71046 X-RAY EXAM CHEST 2 VIEWS: CPT | Performed by: INTERNAL MEDICINE

## 2022-04-12 RX ORDER — OLMESARTAN MEDOXOMIL 40 MG/1
40 TABLET ORAL DAILY
Qty: 90 TABLET | Refills: 1 | Status: SHIPPED | OUTPATIENT
Start: 2022-04-12 | End: 2022-04-12

## 2022-04-12 RX ORDER — FLUOCINOLONE ACETONIDE 0.11 MG/ML
OIL AURICULAR (OTIC)
COMMUNITY
Start: 2022-02-09

## 2022-04-12 RX ORDER — LOSARTAN POTASSIUM 100 MG/1
100 TABLET ORAL DAILY
Qty: 90 TABLET | Refills: 0 | Status: SHIPPED | OUTPATIENT
Start: 2022-04-12

## 2022-04-12 NOTE — TELEPHONE ENCOUNTER
Req: Prior Heather Vitor   Ph: [phone]   Fx: [phone]    Olmesartan Medoxomil (BENICAR) 40 MG Oral Tab'    Fax in triage

## 2022-04-12 NOTE — TELEPHONE ENCOUNTER
From: Heena Chavez  To: Danya Daniels MD  Sent: 4/12/2022 3:28 PM CDT  Subject: RE: Olmesartan    Dr. Manjeet Hitchcockly:  Argentina Rodriguez for bothering you again but my drug plan denied Olmesartan. They said they sent you a form to that effect indicating 3 alternative drugs. If one of those will work just as well, then please let [facility] know which one (and me too). If you did not get the form, here is a copy just in case. Thank you.  Jerardo Brumfield

## 2022-05-03 ENCOUNTER — OFFICE VISIT (OUTPATIENT)
Dept: INTERNAL MEDICINE CLINIC | Facility: CLINIC | Age: 72
End: 2022-05-03
Payer: MEDICARE

## 2022-05-03 VITALS
RESPIRATION RATE: 16 BRPM | HEIGHT: 74 IN | HEART RATE: 56 BPM | DIASTOLIC BLOOD PRESSURE: 88 MMHG | WEIGHT: 286 LBS | TEMPERATURE: 98 F | BODY MASS INDEX: 36.7 KG/M2 | OXYGEN SATURATION: 96 % | SYSTOLIC BLOOD PRESSURE: 154 MMHG

## 2022-05-03 DIAGNOSIS — T46.5X5A ADVERSE EFFECT OF ANGIOTENSIN 2 RECEPTOR ANTAGONIST, INITIAL ENCOUNTER: ICD-10-CM

## 2022-05-03 DIAGNOSIS — I10 ESSENTIAL HYPERTENSION: Primary | ICD-10-CM

## 2022-05-03 PROCEDURE — 99213 OFFICE O/P EST LOW 20 MIN: CPT | Performed by: PHYSICIAN ASSISTANT

## 2022-05-03 RX ORDER — HYDRALAZINE HYDROCHLORIDE 50 MG/1
TABLET, FILM COATED ORAL
Qty: 270 TABLET | Refills: 0 | Status: SHIPPED | OUTPATIENT
Start: 2022-05-03

## 2022-05-03 RX ORDER — HYDRALAZINE HYDROCHLORIDE 50 MG/1
50 TABLET, FILM COATED ORAL 3 TIMES DAILY
Qty: 90 TABLET | Refills: 0 | Status: SHIPPED | OUTPATIENT
Start: 2022-05-03 | End: 2022-05-03

## 2022-05-05 ENCOUNTER — PATIENT MESSAGE (OUTPATIENT)
Dept: INTERNAL MEDICINE CLINIC | Facility: CLINIC | Age: 72
End: 2022-05-05

## 2022-05-06 NOTE — TELEPHONE ENCOUNTER
From: Batsheva Mac  To: Domenica Parekh MD  Sent: 5/5/2022 9:03 PM CDT  Subject: Red face & hands    Dr. Dianna Berman:  [facility] you are feeling better. It appears the Hydralazine may be what is causing the red face and hands and not the Losaratan. The redness continues, more so now with the double dose of Hydralazine. No itching or other issues, just the redness. Once the pharmacy gave me hydralazine red in error and that caused itching. Dr. Gopi Hoffman had me stop for a few days and then resume with the orange and all was well. Apologies for the bother. Thanks.  Nithin Prince

## 2022-05-24 ENCOUNTER — OFFICE VISIT (OUTPATIENT)
Dept: INTERNAL MEDICINE CLINIC | Facility: CLINIC | Age: 72
End: 2022-05-24
Payer: MEDICARE

## 2022-05-24 VITALS
TEMPERATURE: 98 F | RESPIRATION RATE: 16 BRPM | WEIGHT: 286 LBS | DIASTOLIC BLOOD PRESSURE: 70 MMHG | BODY MASS INDEX: 36.7 KG/M2 | SYSTOLIC BLOOD PRESSURE: 122 MMHG | HEART RATE: 50 BPM | OXYGEN SATURATION: 96 % | HEIGHT: 74 IN

## 2022-05-24 DIAGNOSIS — I10 ESSENTIAL HYPERTENSION: Primary | ICD-10-CM

## 2022-05-24 PROCEDURE — 99213 OFFICE O/P EST LOW 20 MIN: CPT | Performed by: PHYSICIAN ASSISTANT

## 2022-05-24 RX ORDER — TRAMADOL HYDROCHLORIDE 50 MG/1
50 TABLET ORAL EVERY 6 HOURS PRN
COMMUNITY
Start: 2022-05-03

## 2022-06-29 ENCOUNTER — LAB ENCOUNTER (OUTPATIENT)
Dept: LAB | Age: 72
End: 2022-06-29
Attending: PHYSICIAN ASSISTANT
Payer: MEDICARE

## 2022-06-29 ENCOUNTER — OFFICE VISIT (OUTPATIENT)
Dept: INTERNAL MEDICINE CLINIC | Facility: CLINIC | Age: 72
End: 2022-06-29
Payer: MEDICARE

## 2022-06-29 VITALS
OXYGEN SATURATION: 96 % | HEIGHT: 74 IN | DIASTOLIC BLOOD PRESSURE: 84 MMHG | TEMPERATURE: 99 F | SYSTOLIC BLOOD PRESSURE: 160 MMHG | BODY MASS INDEX: 36.96 KG/M2 | HEART RATE: 82 BPM | WEIGHT: 288 LBS | RESPIRATION RATE: 16 BRPM

## 2022-06-29 DIAGNOSIS — I10 ESSENTIAL HYPERTENSION: ICD-10-CM

## 2022-06-29 DIAGNOSIS — R23.8 EASY BRUISING: ICD-10-CM

## 2022-06-29 DIAGNOSIS — Z79.01 ANTICOAGULANT LONG-TERM USE: ICD-10-CM

## 2022-06-29 DIAGNOSIS — R60.0 EDEMA OF BOTH LOWER LEGS: ICD-10-CM

## 2022-06-29 DIAGNOSIS — R60.0 EDEMA OF BOTH LOWER LEGS: Primary | ICD-10-CM

## 2022-06-29 LAB
ALBUMIN SERPL-MCNC: 4 G/DL (ref 3.4–5)
ALBUMIN/GLOB SERPL: 1.2 {RATIO} (ref 1–2)
ALP LIVER SERPL-CCNC: 64 U/L
ALT SERPL-CCNC: 43 U/L
ANION GAP SERPL CALC-SCNC: 6 MMOL/L (ref 0–18)
AST SERPL-CCNC: 34 U/L (ref 15–37)
BASOPHILS # BLD AUTO: 0.04 X10(3) UL (ref 0–0.2)
BASOPHILS NFR BLD AUTO: 0.6 %
BILIRUB SERPL-MCNC: 0.7 MG/DL (ref 0.1–2)
BUN BLD-MCNC: 19 MG/DL (ref 7–18)
CALCIUM BLD-MCNC: 9.9 MG/DL (ref 8.5–10.1)
CHLORIDE SERPL-SCNC: 106 MMOL/L (ref 98–112)
CO2 SERPL-SCNC: 29 MMOL/L (ref 21–32)
CREAT BLD-MCNC: 0.98 MG/DL
EOSINOPHIL # BLD AUTO: 0.19 X10(3) UL (ref 0–0.7)
EOSINOPHIL NFR BLD AUTO: 3 %
ERYTHROCYTE [DISTWIDTH] IN BLOOD BY AUTOMATED COUNT: 13.1 %
FASTING STATUS PATIENT QL REPORTED: NO
GLOBULIN PLAS-MCNC: 3.4 G/DL (ref 2.8–4.4)
GLUCOSE BLD-MCNC: 98 MG/DL (ref 70–99)
HCT VFR BLD AUTO: 47.8 %
HGB BLD-MCNC: 15.9 G/DL
IMM GRANULOCYTES # BLD AUTO: 0.01 X10(3) UL (ref 0–1)
IMM GRANULOCYTES NFR BLD: 0.2 %
LYMPHOCYTES # BLD AUTO: 1.59 X10(3) UL (ref 1–4)
LYMPHOCYTES NFR BLD AUTO: 25.3 %
MCH RBC QN AUTO: 31.5 PG (ref 26–34)
MCHC RBC AUTO-ENTMCNC: 33.3 G/DL (ref 31–37)
MCV RBC AUTO: 94.8 FL
MONOCYTES # BLD AUTO: 0.9 X10(3) UL (ref 0.1–1)
MONOCYTES NFR BLD AUTO: 14.3 %
NEUTROPHILS # BLD AUTO: 3.55 X10 (3) UL (ref 1.5–7.7)
NEUTROPHILS # BLD AUTO: 3.55 X10(3) UL (ref 1.5–7.7)
NEUTROPHILS NFR BLD AUTO: 56.6 %
OSMOLALITY SERPL CALC.SUM OF ELEC: 294 MOSM/KG (ref 275–295)
PLATELET # BLD AUTO: 173 10(3)UL (ref 150–450)
POTASSIUM SERPL-SCNC: 4.2 MMOL/L (ref 3.5–5.1)
PROT SERPL-MCNC: 7.4 G/DL (ref 6.4–8.2)
RBC # BLD AUTO: 5.04 X10(6)UL
SODIUM SERPL-SCNC: 141 MMOL/L (ref 136–145)
WBC # BLD AUTO: 6.3 X10(3) UL (ref 4–11)

## 2022-06-29 PROCEDURE — 99214 OFFICE O/P EST MOD 30 MIN: CPT | Performed by: PHYSICIAN ASSISTANT

## 2022-06-29 PROCEDURE — 36415 COLL VENOUS BLD VENIPUNCTURE: CPT

## 2022-06-29 PROCEDURE — 80053 COMPREHEN METABOLIC PANEL: CPT

## 2022-06-29 PROCEDURE — 85025 COMPLETE CBC W/AUTO DIFF WBC: CPT

## 2022-06-29 RX ORDER — FUROSEMIDE 20 MG/1
20 TABLET ORAL DAILY
Qty: 5 TABLET | Refills: 0 | Status: SHIPPED | OUTPATIENT
Start: 2022-06-29

## 2022-06-29 RX ORDER — HYDRALAZINE HYDROCHLORIDE 25 MG/1
TABLET, FILM COATED ORAL
Qty: 270 TABLET | Refills: 0 | Status: SHIPPED | OUTPATIENT
Start: 2022-06-29

## 2022-06-29 RX ORDER — HYDRALAZINE HYDROCHLORIDE 25 MG/1
25 TABLET, FILM COATED ORAL 3 TIMES DAILY
Qty: 90 TABLET | Refills: 0 | Status: SHIPPED | OUTPATIENT
Start: 2022-06-29 | End: 2022-06-29

## 2022-06-29 NOTE — PATIENT INSTRUCTIONS
Take furosemide once daily for 3-5 days for leg swelling  Increase hydralazine dose to 75mg three times daily  Check blood pressure 1-2 times daily and record

## 2022-07-07 ENCOUNTER — LAB ENCOUNTER (OUTPATIENT)
Dept: LAB | Age: 72
End: 2022-07-07
Attending: INTERNAL MEDICINE
Payer: MEDICARE

## 2022-07-07 ENCOUNTER — OFFICE VISIT (OUTPATIENT)
Dept: INTERNAL MEDICINE CLINIC | Facility: CLINIC | Age: 72
End: 2022-07-07
Payer: MEDICARE

## 2022-07-07 VITALS
WEIGHT: 290 LBS | BODY MASS INDEX: 37.22 KG/M2 | TEMPERATURE: 98 F | OXYGEN SATURATION: 97 % | SYSTOLIC BLOOD PRESSURE: 144 MMHG | HEART RATE: 68 BPM | HEIGHT: 74 IN | RESPIRATION RATE: 16 BRPM | DIASTOLIC BLOOD PRESSURE: 72 MMHG

## 2022-07-07 DIAGNOSIS — E78.2 MIXED HYPERLIPIDEMIA: ICD-10-CM

## 2022-07-07 DIAGNOSIS — Z12.5 PROSTATE CANCER SCREENING: ICD-10-CM

## 2022-07-07 DIAGNOSIS — Z00.00 ANNUAL PHYSICAL EXAM: Primary | ICD-10-CM

## 2022-07-07 DIAGNOSIS — I48.0 PAROXYSMAL ATRIAL FIBRILLATION (HCC): ICD-10-CM

## 2022-07-07 DIAGNOSIS — I71.4 ABDOMINAL AORTIC ANEURYSM (AAA) WITHOUT RUPTURE (HCC): ICD-10-CM

## 2022-07-07 DIAGNOSIS — E03.8 SUBCLINICAL HYPOTHYROIDISM: ICD-10-CM

## 2022-07-07 DIAGNOSIS — M17.0 PRIMARY OSTEOARTHRITIS OF BOTH KNEES: ICD-10-CM

## 2022-07-07 DIAGNOSIS — I10 ESSENTIAL HYPERTENSION: ICD-10-CM

## 2022-07-07 DIAGNOSIS — G47.33 OBSTRUCTIVE SLEEP APNEA SYNDROME: ICD-10-CM

## 2022-07-07 DIAGNOSIS — I25.10 ATHEROSCLEROSIS OF NATIVE CORONARY ARTERY OF NATIVE HEART WITHOUT ANGINA PECTORIS: ICD-10-CM

## 2022-07-07 DIAGNOSIS — R60.0 EDEMA OF BOTH LOWER LEGS: ICD-10-CM

## 2022-07-07 DIAGNOSIS — Z00.00 ENCOUNTER FOR ANNUAL HEALTH EXAMINATION: ICD-10-CM

## 2022-07-07 PROBLEM — Z09 S/P UMBILICAL HERNIA REPAIR, FOLLOW-UP EXAM: Status: RESOLVED | Noted: 2021-10-15 | Resolved: 2022-07-07

## 2022-07-07 PROBLEM — M19.041 PRIMARY OSTEOARTHRITIS OF RIGHT HAND: Status: RESOLVED | Noted: 2021-07-13 | Resolved: 2022-07-07

## 2022-07-07 PROBLEM — E66.01 MORBID (SEVERE) OBESITY DUE TO EXCESS CALORIES (HCC): Status: ACTIVE | Noted: 2022-07-07

## 2022-07-07 LAB — COMPLEXED PSA SERPL-MCNC: 0.79 NG/ML (ref ?–4)

## 2022-07-07 PROCEDURE — 36415 COLL VENOUS BLD VENIPUNCTURE: CPT

## 2022-07-07 PROCEDURE — G0439 PPPS, SUBSEQ VISIT: HCPCS | Performed by: INTERNAL MEDICINE

## 2022-07-07 RX ORDER — HYDRALAZINE HYDROCHLORIDE 50 MG/1
50 TABLET, FILM COATED ORAL 3 TIMES DAILY
Qty: 270 TABLET | Refills: 0 | COMMUNITY
Start: 2022-07-07

## 2022-07-07 RX ORDER — AMLODIPINE BESYLATE 5 MG/1
5 TABLET ORAL DAILY
Qty: 90 TABLET | Refills: 1 | Status: SHIPPED | OUTPATIENT
Start: 2022-07-07 | End: 2023-07-02

## 2022-07-07 RX ORDER — HYDROCHLOROTHIAZIDE 25 MG/1
25 TABLET ORAL DAILY
Qty: 90 TABLET | Refills: 1 | Status: SHIPPED | OUTPATIENT
Start: 2022-07-07

## 2022-07-20 ENCOUNTER — HOSPITAL ENCOUNTER (OUTPATIENT)
Dept: ULTRASOUND IMAGING | Facility: HOSPITAL | Age: 72
Discharge: HOME | End: 2022-07-20
Attending: INTERNAL MEDICINE
Payer: MEDICARE

## 2022-07-20 DIAGNOSIS — R60.0 EDEMA OF BOTH LOWER LEGS: ICD-10-CM

## 2022-07-20 PROCEDURE — 93970 EXTREMITY STUDY: CPT | Performed by: INTERNAL MEDICINE

## 2022-07-21 ENCOUNTER — OFFICE VISIT (OUTPATIENT)
Dept: INTERNAL MEDICINE CLINIC | Facility: CLINIC | Age: 72
End: 2022-07-21
Payer: MEDICARE

## 2022-07-21 VITALS
RESPIRATION RATE: 16 BRPM | HEART RATE: 58 BPM | BODY MASS INDEX: 36.96 KG/M2 | DIASTOLIC BLOOD PRESSURE: 68 MMHG | TEMPERATURE: 98 F | WEIGHT: 288 LBS | SYSTOLIC BLOOD PRESSURE: 132 MMHG | OXYGEN SATURATION: 97 % | HEIGHT: 74 IN

## 2022-07-21 DIAGNOSIS — I83.813 VARICOSE VEINS OF BOTH LOWER EXTREMITIES WITH PAIN: ICD-10-CM

## 2022-07-21 DIAGNOSIS — I10 ESSENTIAL HYPERTENSION: Primary | ICD-10-CM

## 2022-07-21 DIAGNOSIS — I83.93 VARICOSE VEINS OF BOTH LOWER EXTREMITIES, UNSPECIFIED WHETHER COMPLICATED: Primary | ICD-10-CM

## 2022-07-21 PROCEDURE — 99213 OFFICE O/P EST LOW 20 MIN: CPT | Performed by: INTERNAL MEDICINE

## 2022-07-26 ENCOUNTER — OFFICE VISIT (OUTPATIENT)
Facility: LOCATION | Age: 72
End: 2022-07-26
Payer: MEDICARE

## 2022-07-26 VITALS
HEART RATE: 47 BPM | DIASTOLIC BLOOD PRESSURE: 59 MMHG | BODY MASS INDEX: 36.96 KG/M2 | SYSTOLIC BLOOD PRESSURE: 152 MMHG | WEIGHT: 288 LBS | HEIGHT: 74 IN

## 2022-07-26 DIAGNOSIS — M25.473 ANKLE SWELLING, UNSPECIFIED LATERALITY: Primary | ICD-10-CM

## 2022-07-26 PROCEDURE — 99204 OFFICE O/P NEW MOD 45 MIN: CPT | Performed by: SURGERY

## 2022-08-13 ENCOUNTER — LAB ENCOUNTER (OUTPATIENT)
Dept: LAB | Age: 72
End: 2022-08-13
Attending: NURSE PRACTITIONER
Payer: MEDICARE

## 2022-08-13 DIAGNOSIS — E78.5 HYPERLIPIDEMIA: ICD-10-CM

## 2022-08-13 DIAGNOSIS — I10 PRIMARY HYPERTENSION: ICD-10-CM

## 2022-08-13 DIAGNOSIS — I25.10 CORONARY ATHEROSCLEROSIS OF NATIVE CORONARY ARTERY: Primary | ICD-10-CM

## 2022-08-13 DIAGNOSIS — M79.89 SWELLING OF BOTH LOWER EXTREMITIES: ICD-10-CM

## 2022-08-13 LAB
ANION GAP SERPL CALC-SCNC: 6 MMOL/L (ref 0–18)
BUN BLD-MCNC: 22 MG/DL (ref 7–18)
CALCIUM BLD-MCNC: 9.6 MG/DL (ref 8.5–10.1)
CHLORIDE SERPL-SCNC: 109 MMOL/L (ref 98–112)
CO2 SERPL-SCNC: 26 MMOL/L (ref 21–32)
CREAT BLD-MCNC: 0.91 MG/DL
FASTING STATUS PATIENT QL REPORTED: YES
GFR SERPLBLD BASED ON 1.73 SQ M-ARVRAT: 91 ML/MIN/1.73M2 (ref 60–?)
GLUCOSE BLD-MCNC: 99 MG/DL (ref 70–99)
OSMOLALITY SERPL CALC.SUM OF ELEC: 295 MOSM/KG (ref 275–295)
POTASSIUM SERPL-SCNC: 3.8 MMOL/L (ref 3.5–5.1)
SODIUM SERPL-SCNC: 141 MMOL/L (ref 136–145)

## 2022-08-13 PROCEDURE — 80048 BASIC METABOLIC PNL TOTAL CA: CPT

## 2022-08-13 PROCEDURE — 36415 COLL VENOUS BLD VENIPUNCTURE: CPT

## 2022-08-16 ENCOUNTER — LAB ENCOUNTER (OUTPATIENT)
Dept: LAB | Age: 72
End: 2022-08-16
Attending: INTERNAL MEDICINE
Payer: MEDICARE

## 2022-08-16 DIAGNOSIS — R06.02 SHORTNESS OF BREATH: ICD-10-CM

## 2022-08-16 DIAGNOSIS — E78.5 HYPERLIPIDEMIA, UNSPECIFIED: ICD-10-CM

## 2022-08-16 DIAGNOSIS — M79.89 SWELLING OF BOTH LOWER EXTREMITIES: ICD-10-CM

## 2022-08-16 DIAGNOSIS — G47.33 OBSTRUCTIVE SLEEP APNEA: ICD-10-CM

## 2022-08-16 DIAGNOSIS — I27.0 PULMONARY HYPERTENSION, PRIMARY (HCC): ICD-10-CM

## 2022-08-16 DIAGNOSIS — R42 DIZZINESS: Primary | ICD-10-CM

## 2022-08-16 LAB — NT-PROBNP SERPL-MCNC: 115 PG/ML (ref ?–125)

## 2022-08-16 PROCEDURE — 36415 COLL VENOUS BLD VENIPUNCTURE: CPT

## 2022-08-16 PROCEDURE — 83880 ASSAY OF NATRIURETIC PEPTIDE: CPT

## 2022-09-06 ENCOUNTER — LAB ENCOUNTER (OUTPATIENT)
Dept: LAB | Age: 72
End: 2022-09-06
Attending: INTERNAL MEDICINE
Payer: MEDICARE

## 2022-09-06 ENCOUNTER — HOSPITAL ENCOUNTER (OUTPATIENT)
Dept: GENERAL RADIOLOGY | Age: 72
Discharge: HOME | End: 2022-09-06
Attending: INTERNAL MEDICINE
Payer: MEDICARE

## 2022-09-06 DIAGNOSIS — Z01.818 PRE-OPERATIVE CLEARANCE: ICD-10-CM

## 2022-09-06 PROCEDURE — 71046 X-RAY EXAM CHEST 2 VIEWS: CPT | Performed by: INTERNAL MEDICINE

## 2022-09-06 RX ORDER — SPIRONOLACTONE 25 MG/1
25 TABLET ORAL DAILY
COMMUNITY

## 2022-09-06 RX ORDER — TORSEMIDE 20 MG/1
20 TABLET ORAL 2 TIMES DAILY
COMMUNITY

## 2022-09-07 LAB — SARS-COV-2 RNA RESP QL NAA+PROBE: NOT DETECTED

## 2022-09-08 ENCOUNTER — LAB ENCOUNTER (OUTPATIENT)
Dept: LAB | Age: 72
End: 2022-09-08
Attending: INTERNAL MEDICINE
Payer: MEDICARE

## 2022-09-08 DIAGNOSIS — I25.10 CORONARY ATHEROSCLEROSIS OF NATIVE CORONARY ARTERY: Primary | ICD-10-CM

## 2022-09-08 DIAGNOSIS — R00.1 BRADYCARDIA: ICD-10-CM

## 2022-09-08 DIAGNOSIS — E78.5 HYPERLIPEMIA: ICD-10-CM

## 2022-09-08 DIAGNOSIS — R06.02 SOB (SHORTNESS OF BREATH): ICD-10-CM

## 2022-09-08 DIAGNOSIS — I10 ESSENTIAL HYPERTENSION, BENIGN: ICD-10-CM

## 2022-09-08 DIAGNOSIS — M79.89 SWELLING OF LIMB: ICD-10-CM

## 2022-09-08 LAB
ALBUMIN SERPL-MCNC: 4.3 G/DL (ref 3.4–5)
ALBUMIN/GLOB SERPL: 1.1 {RATIO} (ref 1–2)
ALP LIVER SERPL-CCNC: 74 U/L
ALT SERPL-CCNC: 40 U/L
ANION GAP SERPL CALC-SCNC: 5 MMOL/L (ref 0–18)
AST SERPL-CCNC: 30 U/L (ref 15–37)
BASOPHILS # BLD AUTO: 0.06 X10(3) UL (ref 0–0.2)
BASOPHILS NFR BLD AUTO: 0.9 %
BILIRUB SERPL-MCNC: 1 MG/DL (ref 0.1–2)
BILIRUB UR QL STRIP.AUTO: NEGATIVE
BUN BLD-MCNC: 31 MG/DL (ref 7–18)
CALCIUM BLD-MCNC: 9.9 MG/DL (ref 8.5–10.1)
CHLORIDE SERPL-SCNC: 104 MMOL/L (ref 98–112)
CLARITY UR REFRACT.AUTO: CLEAR
CO2 SERPL-SCNC: 29 MMOL/L (ref 21–32)
COLOR UR AUTO: YELLOW
CREAT BLD-MCNC: 1.28 MG/DL
EOSINOPHIL # BLD AUTO: 0.27 X10(3) UL (ref 0–0.7)
EOSINOPHIL NFR BLD AUTO: 4 %
ERYTHROCYTE [DISTWIDTH] IN BLOOD BY AUTOMATED COUNT: 12.1 %
FASTING STATUS PATIENT QL REPORTED: YES
GFR SERPLBLD BASED ON 1.73 SQ M-ARVRAT: 60 ML/MIN/1.73M2 (ref 60–?)
GLOBULIN PLAS-MCNC: 3.9 G/DL (ref 2.8–4.4)
GLUCOSE BLD-MCNC: 99 MG/DL (ref 70–99)
GLUCOSE UR STRIP.AUTO-MCNC: NEGATIVE MG/DL
HCT VFR BLD AUTO: 52.3 %
HGB BLD-MCNC: 16.7 G/DL
IMM GRANULOCYTES # BLD AUTO: 0.01 X10(3) UL (ref 0–1)
IMM GRANULOCYTES NFR BLD: 0.1 %
KETONES UR STRIP.AUTO-MCNC: NEGATIVE MG/DL
LEUKOCYTE ESTERASE UR QL STRIP.AUTO: NEGATIVE
LYMPHOCYTES # BLD AUTO: 2.38 X10(3) UL (ref 1–4)
LYMPHOCYTES NFR BLD AUTO: 35.7 %
MCH RBC QN AUTO: 30.9 PG (ref 26–34)
MCHC RBC AUTO-ENTMCNC: 31.9 G/DL (ref 31–37)
MCV RBC AUTO: 96.9 FL
MONOCYTES # BLD AUTO: 0.88 X10(3) UL (ref 0.1–1)
MONOCYTES NFR BLD AUTO: 13.2 %
NEUTROPHILS # BLD AUTO: 3.07 X10 (3) UL (ref 1.5–7.7)
NEUTROPHILS # BLD AUTO: 3.07 X10(3) UL (ref 1.5–7.7)
NEUTROPHILS NFR BLD AUTO: 46.1 %
NITRITE UR QL STRIP.AUTO: NEGATIVE
OSMOLALITY SERPL CALC.SUM OF ELEC: 293 MOSM/KG (ref 275–295)
PH UR STRIP.AUTO: 6 [PH] (ref 5–8)
PLATELET # BLD AUTO: 198 10(3)UL (ref 150–450)
POTASSIUM SERPL-SCNC: 4.3 MMOL/L (ref 3.5–5.1)
PROT SERPL-MCNC: 8.2 G/DL (ref 6.4–8.2)
PROT UR STRIP.AUTO-MCNC: NEGATIVE MG/DL
RBC # BLD AUTO: 5.4 X10(6)UL
RBC UR QL AUTO: NEGATIVE
SODIUM SERPL-SCNC: 138 MMOL/L (ref 136–145)
SP GR UR STRIP.AUTO: 1.01 (ref 1–1.03)
UROBILINOGEN UR STRIP.AUTO-MCNC: 0.2 MG/DL
WBC # BLD AUTO: 6.7 X10(3) UL (ref 4–11)

## 2022-09-08 PROCEDURE — 80053 COMPREHEN METABOLIC PANEL: CPT

## 2022-09-08 PROCEDURE — 85025 COMPLETE CBC W/AUTO DIFF WBC: CPT

## 2022-09-08 PROCEDURE — 36415 COLL VENOUS BLD VENIPUNCTURE: CPT

## 2022-09-08 PROCEDURE — 81003 URINALYSIS AUTO W/O SCOPE: CPT

## 2022-09-09 ENCOUNTER — HOSPITAL ENCOUNTER (OUTPATIENT)
Dept: INTERVENTIONAL RADIOLOGY/VASCULAR | Facility: HOSPITAL | Age: 72
Discharge: HOME | End: 2022-09-09
Attending: INTERNAL MEDICINE | Admitting: INTERNAL MEDICINE
Payer: MEDICARE

## 2022-09-09 VITALS
OXYGEN SATURATION: 96 % | DIASTOLIC BLOOD PRESSURE: 57 MMHG | HEIGHT: 74 IN | WEIGHT: 290 LBS | TEMPERATURE: 98 F | RESPIRATION RATE: 20 BRPM | SYSTOLIC BLOOD PRESSURE: 141 MMHG | BODY MASS INDEX: 37.22 KG/M2 | HEART RATE: 47 BPM

## 2022-09-09 DIAGNOSIS — I25.10 CAD (CORONARY ARTERY DISEASE): ICD-10-CM

## 2022-09-09 DIAGNOSIS — R06.02 SOB (SHORTNESS OF BREATH): ICD-10-CM

## 2022-09-09 PROCEDURE — 99152 MOD SED SAME PHYS/QHP 5/>YRS: CPT | Performed by: INTERNAL MEDICINE

## 2022-09-09 PROCEDURE — B211YZZ FLUOROSCOPY OF MULTIPLE CORONARY ARTERIES USING OTHER CONTRAST: ICD-10-PCS | Performed by: INTERNAL MEDICINE

## 2022-09-09 PROCEDURE — 4A023N8 MEASUREMENT OF CARDIAC SAMPLING AND PRESSURE, BILATERAL, PERCUTANEOUS APPROACH: ICD-10-PCS | Performed by: INTERNAL MEDICINE

## 2022-09-09 PROCEDURE — 99153 MOD SED SAME PHYS/QHP EA: CPT | Performed by: INTERNAL MEDICINE

## 2022-09-09 PROCEDURE — 93460 R&L HRT ART/VENTRICLE ANGIO: CPT | Performed by: INTERNAL MEDICINE

## 2022-09-09 RX ORDER — HEPARIN SODIUM 5000 [USP'U]/ML
INJECTION, SOLUTION INTRAVENOUS; SUBCUTANEOUS
Status: COMPLETED
Start: 2022-09-09 | End: 2022-09-09

## 2022-09-09 RX ORDER — ASPIRIN 81 MG/1
324 TABLET, CHEWABLE ORAL ONCE
Status: COMPLETED | OUTPATIENT
Start: 2022-09-09 | End: 2022-09-09

## 2022-09-09 RX ORDER — VERAPAMIL HYDROCHLORIDE 2.5 MG/ML
INJECTION, SOLUTION INTRAVENOUS
Status: COMPLETED
Start: 2022-09-09 | End: 2022-09-09

## 2022-09-09 RX ORDER — MIDAZOLAM HYDROCHLORIDE 1 MG/ML
INJECTION INTRAMUSCULAR; INTRAVENOUS
Status: COMPLETED
Start: 2022-09-09 | End: 2022-09-09

## 2022-09-09 RX ORDER — IODIXANOL 320 MG/ML
100 INJECTION, SOLUTION INTRAVASCULAR
Status: COMPLETED | OUTPATIENT
Start: 2022-09-09 | End: 2022-09-09

## 2022-09-09 RX ORDER — DIPHENHYDRAMINE HYDROCHLORIDE 50 MG/ML
INJECTION INTRAMUSCULAR; INTRAVENOUS
Status: COMPLETED
Start: 2022-09-09 | End: 2022-09-09

## 2022-09-09 RX ORDER — SODIUM CHLORIDE 9 MG/ML
INJECTION, SOLUTION INTRAVENOUS
Status: COMPLETED | OUTPATIENT
Start: 2022-09-10 | End: 2022-09-09

## 2022-09-09 RX ORDER — LIDOCAINE HYDROCHLORIDE 10 MG/ML
INJECTION, SOLUTION EPIDURAL; INFILTRATION; INTRACAUDAL; PERINEURAL
Status: COMPLETED
Start: 2022-09-09 | End: 2022-09-09

## 2022-09-09 RX ORDER — ASPIRIN 81 MG/1
TABLET, CHEWABLE ORAL
Status: COMPLETED
Start: 2022-09-09 | End: 2022-09-09

## 2022-09-09 RX ADMIN — ASPIRIN 324 MG: 81 TABLET, CHEWABLE ORAL at 12:45:00

## 2022-09-09 RX ADMIN — IODIXANOL 50 ML: 320 INJECTION, SOLUTION INTRAVASCULAR at 14:35:00

## 2022-09-09 RX ADMIN — SODIUM CHLORIDE: 9 INJECTION, SOLUTION INTRAVENOUS at 12:46:00

## 2022-09-09 NOTE — PROGRESS NOTES
Rc'd pt from cath lab s/p [facility] in stable condition. VSS. VascBand to right wrist in place. Site is soft, clean and dry. No bleeding or hematoma. Good radial pulse and pleth waveform. Manual dressing to right brachial vein is soft, clean and dry. Pt denies c/o pain or discomfort. 16:30: After an hour, air was slowly removed from VascBand and a pressure dressing was applied. Site is stable. Dr Heather Mares at bedside. Pt ambulated and tolerated well. Voided. IV removed with catheter intact. Reviewed discharge instructions, verbalizes understanding. Home via w/c in stable condition without c/o. Pts wife is the .

## 2022-09-09 NOTE — PROCEDURES
[address]    Cardiac Catheterization Note    Primary Proceduralist: Liz Ta MD  Procedure Performed: [facility], [address]  Date of Procedure: 9/9/2022   Indication: Shortness of breath  Estimated blood loss: 10cc  Specimens: None    Consent obtained from the patient and documented in the paper chart, unless verbally obtained in an emergency setting. The benefits and risk of the procedure, including but not limited to infection, bleeding, myocardial infarction, stroke, vascular injury, emergency surgery, renal failure requiring dialysis and death were discussed with the patient. These complications occur in approximately 1-2% of elective procedures, but the risk may be significantly elevated in the setting of acute coronary syndrome, electrical or hemodynamic instability, multivessel disease, reduced LVEF or . The patient consented to any additional procedures performed emergently in order to address a complication or prevent medical deterioration. Viable alternatives were explained to the patient, including continuing medical therapy, with the risks incurred along that course. Procedure/Technique:    Lidocaine 1% was administered locally. Access of right radial artery obtained using Seldinger technique. Ultrasound was not used. [address] [place] [address] Drive was inserted. J-wire advanced into the aorta under fluoroscopy. Left coronary system engaged using 6 Western Barbara JL 3.5 diagnostic cath. Selective angiogram performed. Right coronary system engaged using 6 Western Barbara JR4 diagnostic catheter. Selective angiogram performed. 6 Greenlandic pigtail catheter advanced into the LV. Hemodynamics were obtained. Coronary Angiogram Findings:  1. LM: Large caliber artery giving rise to LAD & LCX. No significant stenosis  2. LAD: Large caliber artery giving rise to diagonal and septal branches. Luminal irregularities. 30% proximal LAD stenosis.   3. LCX: Medium to large caliber artery giving rise to OM branches. No significant stenosis. 4. RCA: Large caliber artery giving rise to acute marginal branches, and bifurcates into RPDA & RPL. No significant stenosis. Right dominant coronary circulation. Hemodynamics:  LV 92/9, LVEDP 14  /70, mean 90  No significant gradient upon Ao-LV pullback  LVEF 60 to 65%    RHC:   Technique: The micropuncture needle was used to access right basilic vein. US was used. 5 Mongolian sheath was inserted. The swan was advanced to RA, RV, PA and wedge position obtaining pressures as described above. Hemodynamics:   RA 8  RV 27/3  PA 29/10 mean 18  PCW 12    CO 7.8 L/min / CI 3.7 L/min/m2 (per Kasie)  PVR 1.0 wood units      Monitored sedation administered by the cath lab RN, and supervised throughout the procedure by myself. Total time 34 minutes. Closure: Radial band. No immediate complications. A/P:  1. Nonobstructive CAD. 30% proximal LAD stenosis. No significant stenosis of left main, LCx, RCA. Right dominant circulation  2. Normal filling pressures, cardiac output, LVEF and LVEDP. 3.  Radial band. Discharge later today.   4.  Follow-up with Dr. Seferino Cerna, [facility] 84

## 2022-09-15 ENCOUNTER — TELEPHONE (OUTPATIENT)
Dept: INTERNAL MEDICINE CLINIC | Facility: CLINIC | Age: 72
End: 2022-09-15

## 2022-09-15 DIAGNOSIS — I71.4 ABDOMINAL AORTIC ANEURYSM (AAA) WITHOUT RUPTURE (HCC): Primary | ICD-10-CM

## 2022-09-15 NOTE — TELEPHONE ENCOUNTER
Order placed. Pt aware. He will be checking with [employer] to see if they will cover. Pt feels last years was not covered and balance was written off. Pt informed we will check on billing on our end.

## 2022-12-14 ENCOUNTER — LAB ENCOUNTER (OUTPATIENT)
Dept: LAB | Age: 72
End: 2022-12-14
Attending: INTERNAL MEDICINE
Payer: MEDICARE

## 2022-12-14 DIAGNOSIS — I10 HTN (HYPERTENSION): Primary | ICD-10-CM

## 2022-12-14 DIAGNOSIS — I25.10 ATHEROSCLEROSIS OF CORONARY ARTERY OF NATIVE HEART, UNSPECIFIED VESSEL OR LESION TYPE, UNSPECIFIED WHETHER ANGINA PRESENT: ICD-10-CM

## 2022-12-14 LAB
ALBUMIN SERPL-MCNC: 4.3 G/DL (ref 3.4–5)
ALBUMIN/GLOB SERPL: 1.3 {RATIO} (ref 1–2)
ALP LIVER SERPL-CCNC: 71 U/L
ALT SERPL-CCNC: 41 U/L
ANION GAP SERPL CALC-SCNC: 7 MMOL/L (ref 0–18)
AST SERPL-CCNC: 36 U/L (ref 15–37)
BILIRUB SERPL-MCNC: 1.3 MG/DL (ref 0.1–2)
BUN BLD-MCNC: 34 MG/DL (ref 7–18)
CALCIUM BLD-MCNC: 10.1 MG/DL (ref 8.5–10.1)
CHLORIDE SERPL-SCNC: 104 MMOL/L (ref 98–112)
CO2 SERPL-SCNC: 31 MMOL/L (ref 21–32)
CREAT BLD-MCNC: 1.33 MG/DL
FASTING STATUS PATIENT QL REPORTED: YES
GFR SERPLBLD BASED ON 1.73 SQ M-ARVRAT: 58 ML/MIN/1.73M2 (ref 60–?)
GLOBULIN PLAS-MCNC: 3.4 G/DL (ref 2.8–4.4)
GLUCOSE BLD-MCNC: 101 MG/DL (ref 70–99)
OSMOLALITY SERPL CALC.SUM OF ELEC: 302 MOSM/KG (ref 275–295)
POTASSIUM SERPL-SCNC: 4.1 MMOL/L (ref 3.5–5.1)
PROT SERPL-MCNC: 7.7 G/DL (ref 6.4–8.2)
SODIUM SERPL-SCNC: 142 MMOL/L (ref 136–145)

## 2022-12-14 PROCEDURE — 36415 COLL VENOUS BLD VENIPUNCTURE: CPT

## 2022-12-14 PROCEDURE — 80053 COMPREHEN METABOLIC PANEL: CPT

## 2023-01-13 ENCOUNTER — HOSPITAL ENCOUNTER (OUTPATIENT)
Dept: ULTRASOUND IMAGING | Age: 73
Discharge: HOME | End: 2023-01-13
Attending: INTERNAL MEDICINE
Payer: MEDICARE

## 2023-01-13 DIAGNOSIS — I71.40 ABDOMINAL AORTIC ANEURYSM (AAA) WITHOUT RUPTURE: ICD-10-CM

## 2023-01-13 PROBLEM — I77.811 ECTATIC ABDOMINAL AORTA (HCC): Status: ACTIVE | Noted: 2017-01-01

## 2023-01-13 PROBLEM — I77.811 ECTATIC ABDOMINAL AORTA: Status: ACTIVE | Noted: 2017-01-01

## 2023-01-13 PROCEDURE — 76770 US EXAM ABDO BACK WALL COMP: CPT | Performed by: INTERNAL MEDICINE

## 2023-01-18 ENCOUNTER — LAB ENCOUNTER (OUTPATIENT)
Dept: LAB | Age: 73
End: 2023-01-18
Attending: INTERNAL MEDICINE
Payer: MEDICARE

## 2023-01-18 DIAGNOSIS — I10 ESSENTIAL HYPERTENSION, MALIGNANT: ICD-10-CM

## 2023-01-18 DIAGNOSIS — E78.5 HYPERLIPEMIA: Primary | ICD-10-CM

## 2023-01-18 LAB
ANION GAP SERPL CALC-SCNC: 3 MMOL/L (ref 0–18)
BUN BLD-MCNC: 28 MG/DL (ref 7–18)
CALCIUM BLD-MCNC: 9.2 MG/DL (ref 8.5–10.1)
CHLORIDE SERPL-SCNC: 107 MMOL/L (ref 98–112)
CO2 SERPL-SCNC: 29 MMOL/L (ref 21–32)
CREAT BLD-MCNC: 1.28 MG/DL
FASTING STATUS PATIENT QL REPORTED: YES
GFR SERPLBLD BASED ON 1.73 SQ M-ARVRAT: 60 ML/MIN/1.73M2 (ref 60–?)
GLUCOSE BLD-MCNC: 101 MG/DL (ref 70–99)
OSMOLALITY SERPL CALC.SUM OF ELEC: 294 MOSM/KG (ref 275–295)
POTASSIUM SERPL-SCNC: 3.5 MMOL/L (ref 3.5–5.1)
SODIUM SERPL-SCNC: 139 MMOL/L (ref 136–145)

## 2023-01-18 PROCEDURE — 80048 BASIC METABOLIC PNL TOTAL CA: CPT

## 2023-01-18 PROCEDURE — 36415 COLL VENOUS BLD VENIPUNCTURE: CPT

## 2023-01-23 ENCOUNTER — OFFICE VISIT (OUTPATIENT)
Dept: INTERNAL MEDICINE CLINIC | Facility: CLINIC | Age: 73
End: 2023-01-23
Payer: MEDICARE

## 2023-01-23 VITALS
RESPIRATION RATE: 17 BRPM | BODY MASS INDEX: 37.35 KG/M2 | SYSTOLIC BLOOD PRESSURE: 138 MMHG | WEIGHT: 291 LBS | HEIGHT: 74 IN | DIASTOLIC BLOOD PRESSURE: 78 MMHG | HEART RATE: 59 BPM | TEMPERATURE: 98 F | OXYGEN SATURATION: 97 %

## 2023-01-23 DIAGNOSIS — E78.2 MIXED HYPERLIPIDEMIA: ICD-10-CM

## 2023-01-23 DIAGNOSIS — I10 ESSENTIAL HYPERTENSION: Primary | ICD-10-CM

## 2023-01-23 DIAGNOSIS — I48.0 PAROXYSMAL ATRIAL FIBRILLATION (HCC): ICD-10-CM

## 2023-01-23 PROCEDURE — 99214 OFFICE O/P EST MOD 30 MIN: CPT | Performed by: INTERNAL MEDICINE

## 2023-01-23 RX ORDER — HYDRALAZINE HYDROCHLORIDE 100 MG/1
100 TABLET, FILM COATED ORAL 3 TIMES DAILY
COMMUNITY
Start: 2022-12-20

## 2023-01-23 RX ORDER — LABETALOL 200 MG/1
200 TABLET, FILM COATED ORAL 2 TIMES DAILY
COMMUNITY
Start: 2022-12-20

## 2023-01-27 ENCOUNTER — PATIENT MESSAGE (OUTPATIENT)
Dept: INTERNAL MEDICINE CLINIC | Facility: CLINIC | Age: 73
End: 2023-01-27

## 2023-01-27 NOTE — TELEPHONE ENCOUNTER
From: Teresa Almanzar  To: Cathi Mcgee MD  Sent: 1/27/2023 10:54 AM CST  Subject: Earlier appointment messages    Dr. Trinh Rose  Can I be taken off the list with offers of an earlier appointment? If a problem, then never mind. It just seems like I get the offers everyday. Thank you.   Ignacio Beavers

## 2023-01-30 ENCOUNTER — OFFICE VISIT (OUTPATIENT)
Facility: LOCATION | Age: 73
End: 2023-01-30
Payer: MEDICARE

## 2023-01-30 DIAGNOSIS — D68.9 COAGULOPATHY (HCC): ICD-10-CM

## 2023-01-30 DIAGNOSIS — R04.0 EPISTAXIS: Primary | ICD-10-CM

## 2023-01-30 RX ORDER — CLINDAMYCIN HYDROCHLORIDE 300 MG/1
300 CAPSULE ORAL 3 TIMES DAILY
Qty: 30 CAPSULE | Refills: 1 | Status: SHIPPED | OUTPATIENT
Start: 2023-01-30

## 2023-02-22 ENCOUNTER — LAB ENCOUNTER (OUTPATIENT)
Dept: LAB | Age: 73
End: 2023-02-22
Attending: INTERNAL MEDICINE
Payer: MEDICARE

## 2023-02-22 DIAGNOSIS — E78.5 HYPERLIPEMIA: ICD-10-CM

## 2023-02-22 DIAGNOSIS — I25.10 CORONARY ATHEROSCLEROSIS OF NATIVE CORONARY ARTERY: ICD-10-CM

## 2023-02-22 DIAGNOSIS — I48.0 PAROXYSMAL ATRIAL FIBRILLATION (HCC): ICD-10-CM

## 2023-02-22 LAB
ALBUMIN SERPL-MCNC: 4.3 G/DL (ref 3.4–5)
ALBUMIN/GLOB SERPL: 1.3 {RATIO} (ref 1–2)
ALP LIVER SERPL-CCNC: 64 U/L
ALT SERPL-CCNC: 51 U/L
ANION GAP SERPL CALC-SCNC: 5 MMOL/L (ref 0–18)
AST SERPL-CCNC: 49 U/L (ref 15–37)
BASOPHILS # BLD AUTO: 0.05 X10(3) UL (ref 0–0.2)
BASOPHILS NFR BLD AUTO: 0.8 %
BILIRUB SERPL-MCNC: 0.7 MG/DL (ref 0.1–2)
BUN BLD-MCNC: 32 MG/DL (ref 7–18)
CALCIUM BLD-MCNC: 9.6 MG/DL (ref 8.5–10.1)
CHLORIDE SERPL-SCNC: 104 MMOL/L (ref 98–112)
CHOLEST SERPL-MCNC: 146 MG/DL (ref ?–200)
CO2 SERPL-SCNC: 31 MMOL/L (ref 21–32)
CREAT BLD-MCNC: 1.68 MG/DL
EOSINOPHIL # BLD AUTO: 0.23 X10(3) UL (ref 0–0.7)
EOSINOPHIL NFR BLD AUTO: 3.7 %
ERYTHROCYTE [DISTWIDTH] IN BLOOD BY AUTOMATED COUNT: 12.4 %
FASTING PATIENT LIPID ANSWER: YES
FASTING STATUS PATIENT QL REPORTED: YES
GFR SERPLBLD BASED ON 1.73 SQ M-ARVRAT: 43 ML/MIN/1.73M2 (ref 60–?)
GLOBULIN PLAS-MCNC: 3.4 G/DL (ref 2.8–4.4)
GLUCOSE BLD-MCNC: 107 MG/DL (ref 70–99)
HCT VFR BLD AUTO: 47.4 %
HDLC SERPL-MCNC: 60 MG/DL (ref 40–59)
HGB BLD-MCNC: 15.6 G/DL
IMM GRANULOCYTES # BLD AUTO: 0.02 X10(3) UL (ref 0–1)
IMM GRANULOCYTES NFR BLD: 0.3 %
LDLC SERPL CALC-MCNC: 68 MG/DL (ref ?–100)
LYMPHOCYTES # BLD AUTO: 1.83 X10(3) UL (ref 1–4)
LYMPHOCYTES NFR BLD AUTO: 29.6 %
MCH RBC QN AUTO: 31.1 PG (ref 26–34)
MCHC RBC AUTO-ENTMCNC: 32.9 G/DL (ref 31–37)
MCV RBC AUTO: 94.6 FL
MONOCYTES # BLD AUTO: 0.81 X10(3) UL (ref 0.1–1)
MONOCYTES NFR BLD AUTO: 13.1 %
NEUTROPHILS # BLD AUTO: 3.24 X10 (3) UL (ref 1.5–7.7)
NEUTROPHILS # BLD AUTO: 3.24 X10(3) UL (ref 1.5–7.7)
NEUTROPHILS NFR BLD AUTO: 52.5 %
NONHDLC SERPL-MCNC: 86 MG/DL (ref ?–130)
OSMOLALITY SERPL CALC.SUM OF ELEC: 297 MOSM/KG (ref 275–295)
PLATELET # BLD AUTO: 192 10(3)UL (ref 150–450)
POTASSIUM SERPL-SCNC: 3.2 MMOL/L (ref 3.5–5.1)
PROT SERPL-MCNC: 7.7 G/DL (ref 6.4–8.2)
RBC # BLD AUTO: 5.01 X10(6)UL
SODIUM SERPL-SCNC: 140 MMOL/L (ref 136–145)
TRIGL SERPL-MCNC: 96 MG/DL (ref 30–149)
VLDLC SERPL CALC-MCNC: 15 MG/DL (ref 0–30)
WBC # BLD AUTO: 6.2 X10(3) UL (ref 4–11)

## 2023-02-22 PROCEDURE — 85025 COMPLETE CBC W/AUTO DIFF WBC: CPT

## 2023-02-22 PROCEDURE — 80061 LIPID PANEL: CPT

## 2023-02-22 PROCEDURE — 36415 COLL VENOUS BLD VENIPUNCTURE: CPT

## 2023-02-22 PROCEDURE — 80053 COMPREHEN METABOLIC PANEL: CPT

## 2023-02-23 ENCOUNTER — PATIENT MESSAGE (OUTPATIENT)
Dept: INTERNAL MEDICINE CLINIC | Facility: CLINIC | Age: 73
End: 2023-02-23

## 2023-02-23 DIAGNOSIS — E87.6 HYPOKALEMIA: Primary | ICD-10-CM

## 2023-02-23 DIAGNOSIS — R79.89 ELEVATED SERUM CREATININE: ICD-10-CM

## 2023-02-23 RX ORDER — POTASSIUM CHLORIDE 1500 MG/1
20 TABLET, FILM COATED, EXTENDED RELEASE ORAL DAILY
Qty: 90 TABLET | Refills: 0 | Status: SHIPPED | OUTPATIENT
Start: 2023-02-23 | End: 2023-05-24

## 2023-02-23 NOTE — TELEPHONE ENCOUNTER
From: Trish Button  To: Marilu Ascencio MD  Sent: 2/23/2023 11:07 AM CST  Subject: CMP done 02-    Dr. Polanco Big:  The CMP done on 02- for Dr. Ananda Odom appears to have some less than desireable numbers. Do I need to make an appointment with you for this or wait until I see Dr. Ananda Odom on March 1st or both? Thank you.

## 2023-03-01 ENCOUNTER — LAB ENCOUNTER (OUTPATIENT)
Dept: LAB | Facility: HOSPITAL | Age: 73
End: 2023-03-01
Attending: INTERNAL MEDICINE
Payer: MEDICARE

## 2023-03-01 ENCOUNTER — LABORATORY ENCOUNTER (OUTPATIENT)
Dept: LAB | Age: 73
End: 2023-03-01
Attending: INTERNAL MEDICINE
Payer: MEDICARE

## 2023-03-01 DIAGNOSIS — Z01.818 PRE-OP EXAM: ICD-10-CM

## 2023-03-01 DIAGNOSIS — E78.5 HYPERLIPIDEMIA, UNSPECIFIED: Primary | ICD-10-CM

## 2023-03-01 DIAGNOSIS — R79.89 ELEVATED SERUM CREATININE: ICD-10-CM

## 2023-03-01 DIAGNOSIS — E87.6 HYPOKALEMIA: ICD-10-CM

## 2023-03-01 LAB
ALBUMIN SERPL-MCNC: 4.1 G/DL (ref 3.4–5)
ALBUMIN/GLOB SERPL: 1.1 {RATIO} (ref 1–2)
ALP LIVER SERPL-CCNC: 58 U/L
ALT SERPL-CCNC: 51 U/L
ANION GAP SERPL CALC-SCNC: 9 MMOL/L (ref 0–18)
AST SERPL-CCNC: 65 U/L (ref 15–37)
BILIRUB SERPL-MCNC: 1.1 MG/DL (ref 0.1–2)
BUN BLD-MCNC: 25 MG/DL (ref 7–18)
CALCIUM BLD-MCNC: 9.9 MG/DL (ref 8.5–10.1)
CHLORIDE SERPL-SCNC: 98 MMOL/L (ref 98–112)
CO2 SERPL-SCNC: 31 MMOL/L (ref 21–32)
CREAT BLD-MCNC: 1.46 MG/DL
EST. AVERAGE GLUCOSE BLD GHB EST-MCNC: 117 MG/DL (ref 68–126)
FASTING STATUS PATIENT QL REPORTED: YES
GFR SERPLBLD BASED ON 1.73 SQ M-ARVRAT: 51 ML/MIN/1.73M2 (ref 60–?)
GLOBULIN PLAS-MCNC: 3.7 G/DL (ref 2.8–4.4)
GLUCOSE BLD-MCNC: 104 MG/DL (ref 70–99)
HBA1C MFR BLD: 5.7 % (ref ?–5.7)
OSMOLALITY SERPL CALC.SUM OF ELEC: 291 MOSM/KG (ref 275–295)
POTASSIUM SERPL-SCNC: 3.5 MMOL/L (ref 3.5–5.1)
PROT SERPL-MCNC: 7.8 G/DL (ref 6.4–8.2)
SODIUM SERPL-SCNC: 138 MMOL/L (ref 136–145)

## 2023-03-01 PROCEDURE — 82088 ASSAY OF ALDOSTERONE: CPT

## 2023-03-01 PROCEDURE — 83036 HEMOGLOBIN GLYCOSYLATED A1C: CPT

## 2023-03-01 PROCEDURE — 84244 ASSAY OF RENIN: CPT

## 2023-03-01 PROCEDURE — 80053 COMPREHEN METABOLIC PANEL: CPT

## 2023-03-01 PROCEDURE — 36415 COLL VENOUS BLD VENIPUNCTURE: CPT

## 2023-03-02 DIAGNOSIS — I10 ESSENTIAL HYPERTENSION: Primary | ICD-10-CM

## 2023-03-04 LAB
ALDOSTERONE/RENIN ACTIVITY RATIO: 4.7 RATIO
ALDOSTERONE: 47.1 NG/DL
RENIN ACTIVITY: 10 NG/ML/HR

## 2023-03-08 ENCOUNTER — OFFICE VISIT (OUTPATIENT)
Dept: INTERNAL MEDICINE CLINIC | Facility: CLINIC | Age: 73
End: 2023-03-08
Payer: MEDICARE

## 2023-03-08 ENCOUNTER — PATIENT MESSAGE (OUTPATIENT)
Dept: INTERNAL MEDICINE CLINIC | Facility: CLINIC | Age: 73
End: 2023-03-08

## 2023-03-08 VITALS
DIASTOLIC BLOOD PRESSURE: 70 MMHG | OXYGEN SATURATION: 98 % | SYSTOLIC BLOOD PRESSURE: 140 MMHG | TEMPERATURE: 98 F | HEART RATE: 80 BPM | RESPIRATION RATE: 16 BRPM | WEIGHT: 279 LBS | BODY MASS INDEX: 35.81 KG/M2 | HEIGHT: 74 IN

## 2023-03-08 DIAGNOSIS — I10 ESSENTIAL HYPERTENSION: ICD-10-CM

## 2023-03-08 DIAGNOSIS — G47.00 INSOMNIA, UNSPECIFIED TYPE: ICD-10-CM

## 2023-03-08 DIAGNOSIS — M17.0 PRIMARY OSTEOARTHRITIS OF BOTH KNEES: ICD-10-CM

## 2023-03-08 DIAGNOSIS — I48.0 PAROXYSMAL ATRIAL FIBRILLATION (HCC): ICD-10-CM

## 2023-03-08 DIAGNOSIS — R10.13 EPIGASTRIC PAIN: Primary | ICD-10-CM

## 2023-03-08 LAB
ATRIAL RATE: 58 BPM
P AXIS: 47 DEGREES
P-R INTERVAL: 176 MS
Q-T INTERVAL: 418 MS
QRS DURATION: 132 MS
QTC CALCULATION (BEZET): 410 MS
R AXIS: -22 DEGREES
T AXIS: 133 DEGREES
VENTRICULAR RATE: 58 BPM

## 2023-03-08 PROCEDURE — 93000 ELECTROCARDIOGRAM COMPLETE: CPT | Performed by: PHYSICIAN ASSISTANT

## 2023-03-08 PROCEDURE — 99213 OFFICE O/P EST LOW 20 MIN: CPT | Performed by: PHYSICIAN ASSISTANT

## 2023-03-08 NOTE — TELEPHONE ENCOUNTER
Contacted patient via phone to discuss symptoms. Onset last night- low grade fever initially of 99, now 100.0. Additional vitals stable. Difficulty sleeping with new onset \"hyperdrive sensation\" that patient describes as jitteriness. Covid test negative this morning. Patient concerned, as patient has renal scan scheduled for following week with Dr. Ayaan Jeter due to renal function lab results. Spoke to Phong Easley PA-C: okay to be seen in office today for further evaluation. Appt scheduled for 11 today with [facility]. Patient verbalized plan of care.

## 2023-03-08 NOTE — TELEPHONE ENCOUNTER
From: Justine Inch  To: Carmina Bahena MD  Sent: 3/8/2023 8:16 AM CST  Subject: RE: Fever and red face    Dr. Nereida Hickey:  Martell Signs for the bother again but last night I couldn't sleep even though I took a Zaleplon 5 mg and Melatonin 10 mg, headache, fever of 99.8 and red face. My home covid test says no covid. No cough, no runny nose or any of that but my pulse is up and I feel like I'm in hyperdrive. Should I make an appointmnet, go to the ER given my blood pressure issues, or keep taking tylenol and cool my jets. I'm scheduled for a kidney scan next week with [facility]. Thank you.   Kp Hdez

## 2023-04-17 ENCOUNTER — OFFICE VISIT (OUTPATIENT)
Dept: NEPHROLOGY | Facility: CLINIC | Age: 73
End: 2023-04-17
Payer: MEDICARE

## 2023-04-17 VITALS — BODY MASS INDEX: 35 KG/M2 | WEIGHT: 276.13 LBS | SYSTOLIC BLOOD PRESSURE: 126 MMHG | DIASTOLIC BLOOD PRESSURE: 58 MMHG

## 2023-04-17 DIAGNOSIS — N18.31 STAGE 3A CHRONIC KIDNEY DISEASE (HCC): Primary | ICD-10-CM

## 2023-04-17 DIAGNOSIS — I10 ESSENTIAL HYPERTENSION: ICD-10-CM

## 2023-05-07 DIAGNOSIS — E87.6 HYPOKALEMIA: ICD-10-CM

## 2023-05-07 RX ORDER — POTASSIUM CHLORIDE 1500 MG/1
TABLET, FILM COATED, EXTENDED RELEASE ORAL
Qty: 90 TABLET | Refills: 0 | Status: SHIPPED | OUTPATIENT
Start: 2023-05-07

## 2023-05-09 ENCOUNTER — OFFICE VISIT (OUTPATIENT)
Facility: LOCATION | Age: 73
End: 2023-05-09
Payer: MEDICARE

## 2023-05-09 DIAGNOSIS — R04.0 EPISTAXIS: Primary | ICD-10-CM

## 2023-05-09 PROCEDURE — 30901 CONTROL OF NOSEBLEED: CPT | Performed by: OTOLARYNGOLOGY

## 2023-05-09 PROCEDURE — 99214 OFFICE O/P EST MOD 30 MIN: CPT | Performed by: OTOLARYNGOLOGY

## 2023-06-01 ENCOUNTER — LABORATORY ENCOUNTER (OUTPATIENT)
Dept: LAB | Age: 73
End: 2023-06-01
Attending: INTERNAL MEDICINE
Payer: MEDICARE

## 2023-06-01 DIAGNOSIS — I10 ESSENTIAL HYPERTENSION: ICD-10-CM

## 2023-06-01 LAB
ALBUMIN SERPL-MCNC: 4.1 G/DL (ref 3.4–5)
ALBUMIN/GLOB SERPL: 1.2 {RATIO} (ref 1–2)
ALP LIVER SERPL-CCNC: 63 U/L
ALT SERPL-CCNC: 42 U/L
ANION GAP SERPL CALC-SCNC: 6 MMOL/L (ref 0–18)
AST SERPL-CCNC: 39 U/L (ref 15–37)
BILIRUB SERPL-MCNC: 1.3 MG/DL (ref 0.1–2)
BUN BLD-MCNC: 32 MG/DL (ref 7–18)
CALCIUM BLD-MCNC: 9.4 MG/DL (ref 8.5–10.1)
CHLORIDE SERPL-SCNC: 105 MMOL/L (ref 98–112)
CO2 SERPL-SCNC: 26 MMOL/L (ref 21–32)
CREAT BLD-MCNC: 1.44 MG/DL
FASTING STATUS PATIENT QL REPORTED: YES
GFR SERPLBLD BASED ON 1.73 SQ M-ARVRAT: 52 ML/MIN/1.73M2 (ref 60–?)
GLOBULIN PLAS-MCNC: 3.4 G/DL (ref 2.8–4.4)
GLUCOSE BLD-MCNC: 104 MG/DL (ref 70–99)
OSMOLALITY SERPL CALC.SUM OF ELEC: 291 MOSM/KG (ref 275–295)
POTASSIUM SERPL-SCNC: 3.2 MMOL/L (ref 3.5–5.1)
PROT SERPL-MCNC: 7.5 G/DL (ref 6.4–8.2)
SODIUM SERPL-SCNC: 137 MMOL/L (ref 136–145)

## 2023-06-01 PROCEDURE — 36415 COLL VENOUS BLD VENIPUNCTURE: CPT

## 2023-06-01 PROCEDURE — 80053 COMPREHEN METABOLIC PANEL: CPT

## 2023-06-02 NOTE — PROGRESS NOTES
Spoke to pt. Made aware of results & recommendations. Pt voiced understanding.   Pt is taking potassium supplement regularly, per Trinda Lighter increase dose to 2 tabs for one week and repeat lab to ensure levels  Repeat lab ordered  Understanding verbalized

## 2023-06-09 ENCOUNTER — LABORATORY ENCOUNTER (OUTPATIENT)
Dept: LAB | Age: 73
End: 2023-06-09
Attending: NURSE PRACTITIONER
Payer: MEDICARE

## 2023-06-09 DIAGNOSIS — E87.6 HYPOKALEMIA: ICD-10-CM

## 2023-06-09 LAB — POTASSIUM SERPL-SCNC: 4 MMOL/L (ref 3.5–5.1)

## 2023-06-09 PROCEDURE — 36415 COLL VENOUS BLD VENIPUNCTURE: CPT

## 2023-06-09 PROCEDURE — 84132 ASSAY OF SERUM POTASSIUM: CPT

## 2023-06-23 ENCOUNTER — OFFICE VISIT (OUTPATIENT)
Dept: INTERNAL MEDICINE CLINIC | Facility: CLINIC | Age: 73
End: 2023-06-23
Payer: MEDICARE

## 2023-06-23 VITALS
HEIGHT: 74 IN | WEIGHT: 260 LBS | SYSTOLIC BLOOD PRESSURE: 120 MMHG | RESPIRATION RATE: 16 BRPM | OXYGEN SATURATION: 99 % | BODY MASS INDEX: 33.37 KG/M2 | HEART RATE: 62 BPM | TEMPERATURE: 98 F | DIASTOLIC BLOOD PRESSURE: 70 MMHG

## 2023-06-23 DIAGNOSIS — T46.6X5A MYALGIA DUE TO STATIN: Primary | ICD-10-CM

## 2023-06-23 DIAGNOSIS — M79.10 MYALGIA DUE TO STATIN: Primary | ICD-10-CM

## 2023-07-09 ENCOUNTER — PATIENT MESSAGE (OUTPATIENT)
Dept: INTERNAL MEDICINE CLINIC | Facility: CLINIC | Age: 73
End: 2023-07-09

## 2023-07-09 DIAGNOSIS — M79.605 BILATERAL LEG PAIN: Primary | ICD-10-CM

## 2023-07-09 DIAGNOSIS — M79.604 BILATERAL LEG PAIN: Primary | ICD-10-CM

## 2023-07-10 RX ORDER — DIAZEPAM 5 MG/1
5 TABLET ORAL NIGHTLY
Qty: 14 TABLET | Refills: 0 | Status: SHIPPED | OUTPATIENT
Start: 2023-07-10 | End: 2023-07-24

## 2023-07-10 NOTE — TELEPHONE ENCOUNTER
Per Dr. Greta Cunningham EMG b/l legs, restart atorvastatin, stop tonic water and begin diazepam 5mg nightly x 14 days.

## 2023-07-10 NOTE — TELEPHONE ENCOUNTER
From: Collins Salazar  To: Milton Patel MD  Sent: 7/9/2023 6:30 AM CDT  Subject: RE: Night Lower Leg Pain    Dr. Luis Enrique Campo:  Since my last visit I have been off the Atorvastatin for 2 weeks and drinking tonic water at night but the lower leg pain at night continues. Should I try staying off the Atorvastatin another 2 weeks and continue the tonic water? Until I see you the 25th, what can I take for the spasm / pain? Tylenol, meloxicam and Tramadol have no effect. (Perhaps add gin to the tonic water?) Thank you. P.S. If I take an hour nap during the day, no leg pain. Thank you.  Addis Mandel

## 2023-07-13 ENCOUNTER — HOSPITAL ENCOUNTER (EMERGENCY)
Age: 73
Discharge: HOME | End: 2023-07-13
Attending: EMERGENCY MEDICINE
Payer: MEDICARE

## 2023-07-13 ENCOUNTER — PATIENT MESSAGE (OUTPATIENT)
Dept: INTERNAL MEDICINE CLINIC | Facility: CLINIC | Age: 73
End: 2023-07-13

## 2023-07-13 ENCOUNTER — APPOINTMENT (OUTPATIENT)
Dept: GENERAL RADIOLOGY | Age: 73
End: 2023-07-13
Attending: EMERGENCY MEDICINE
Payer: MEDICARE

## 2023-07-13 VITALS
BODY MASS INDEX: 34.65 KG/M2 | HEIGHT: 74 IN | TEMPERATURE: 98 F | DIASTOLIC BLOOD PRESSURE: 55 MMHG | HEART RATE: 51 BPM | OXYGEN SATURATION: 96 % | RESPIRATION RATE: 18 BRPM | WEIGHT: 270 LBS | SYSTOLIC BLOOD PRESSURE: 153 MMHG

## 2023-07-13 DIAGNOSIS — S80.812A ABRASION OF LEFT LOWER EXTREMITY, INITIAL ENCOUNTER: Primary | ICD-10-CM

## 2023-07-13 PROCEDURE — 73590 X-RAY EXAM OF LOWER LEG: CPT | Performed by: EMERGENCY MEDICINE

## 2023-07-13 PROCEDURE — 99284 EMERGENCY DEPT VISIT MOD MDM: CPT

## 2023-07-13 PROCEDURE — 99283 EMERGENCY DEPT VISIT LOW MDM: CPT

## 2023-07-13 RX ORDER — HYDROCODONE BITARTRATE AND ACETAMINOPHEN 5; 325 MG/1; MG/1
1 TABLET ORAL EVERY 6 HOURS PRN
Qty: 10 TABLET | Refills: 0 | Status: SHIPPED | OUTPATIENT
Start: 2023-07-13 | End: 2023-07-20

## 2023-07-13 NOTE — DISCHARGE INSTRUCTIONS
Wash abrasions gently and dress with Silvadene cream twice per day. Elevate left leg is much as possible and try to minimize activity for the next couple of days to help reduce swelling and pain. Hydrocodone for severe pain as needed.
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
## 2023-07-25 ENCOUNTER — OFFICE VISIT (OUTPATIENT)
Dept: INTERNAL MEDICINE CLINIC | Facility: CLINIC | Age: 73
End: 2023-07-25
Payer: MEDICARE

## 2023-07-25 VITALS
WEIGHT: 268 LBS | DIASTOLIC BLOOD PRESSURE: 70 MMHG | BODY MASS INDEX: 34.39 KG/M2 | OXYGEN SATURATION: 95 % | SYSTOLIC BLOOD PRESSURE: 120 MMHG | HEIGHT: 74 IN | HEART RATE: 78 BPM | RESPIRATION RATE: 16 BRPM | TEMPERATURE: 98 F

## 2023-07-25 DIAGNOSIS — I10 ESSENTIAL HYPERTENSION: ICD-10-CM

## 2023-07-25 DIAGNOSIS — Z00.00 ANNUAL PHYSICAL EXAM: Primary | ICD-10-CM

## 2023-07-25 DIAGNOSIS — Z00.00 ENCOUNTER FOR ANNUAL HEALTH EXAMINATION: ICD-10-CM

## 2023-07-25 DIAGNOSIS — I77.811 ECTATIC ABDOMINAL AORTA (HCC): ICD-10-CM

## 2023-07-25 DIAGNOSIS — I27.0 PULMONARY HYPERTENSION, PRIMARY (HCC): ICD-10-CM

## 2023-07-25 DIAGNOSIS — E87.6 HYPOKALEMIA: ICD-10-CM

## 2023-07-25 DIAGNOSIS — M17.0 PRIMARY OSTEOARTHRITIS OF BOTH KNEES: ICD-10-CM

## 2023-07-25 DIAGNOSIS — N18.32 STAGE 3B CHRONIC KIDNEY DISEASE (HCC): ICD-10-CM

## 2023-07-25 DIAGNOSIS — I25.10 ATHEROSCLEROSIS OF NATIVE CORONARY ARTERY OF NATIVE HEART WITHOUT ANGINA PECTORIS: ICD-10-CM

## 2023-07-25 DIAGNOSIS — M24.549 CONTRACTURE OF HAND: ICD-10-CM

## 2023-07-25 DIAGNOSIS — E66.01 MORBID (SEVERE) OBESITY DUE TO EXCESS CALORIES (HCC): ICD-10-CM

## 2023-07-25 DIAGNOSIS — I48.0 PAROXYSMAL ATRIAL FIBRILLATION (HCC): ICD-10-CM

## 2023-07-25 DIAGNOSIS — E03.8 SUBCLINICAL HYPOTHYROIDISM: ICD-10-CM

## 2023-07-25 DIAGNOSIS — G47.33 OBSTRUCTIVE SLEEP APNEA SYNDROME: ICD-10-CM

## 2023-07-25 DIAGNOSIS — E78.2 MIXED HYPERLIPIDEMIA: ICD-10-CM

## 2023-07-25 PROCEDURE — G0439 PPPS, SUBSEQ VISIT: HCPCS | Performed by: INTERNAL MEDICINE

## 2023-07-25 PROCEDURE — 1126F AMNT PAIN NOTED NONE PRSNT: CPT | Performed by: INTERNAL MEDICINE

## 2023-07-25 RX ORDER — ZALEPLON 5 MG/1
5 CAPSULE ORAL NIGHTLY
COMMUNITY
Start: 2023-06-25

## 2023-07-25 RX ORDER — POTASSIUM CHLORIDE 1500 MG/1
20 TABLET, EXTENDED RELEASE ORAL DAILY
Qty: 90 TABLET | Refills: 0 | Status: SHIPPED | OUTPATIENT
Start: 2023-07-25 | End: 2023-10-23

## 2023-07-26 ENCOUNTER — TELEPHONE (OUTPATIENT)
Dept: INTERNAL MEDICINE CLINIC | Facility: CLINIC | Age: 73
End: 2023-07-26

## 2023-08-01 ENCOUNTER — HOSPITAL ENCOUNTER (OUTPATIENT)
Dept: GENERAL RADIOLOGY | Age: 73
Discharge: HOME | End: 2023-08-01
Attending: PHYSICIAN ASSISTANT
Payer: MEDICARE

## 2023-08-01 ENCOUNTER — OFFICE VISIT (OUTPATIENT)
Dept: ORTHOPEDICS CLINIC | Facility: CLINIC | Age: 73
End: 2023-08-01
Payer: MEDICARE

## 2023-08-01 VITALS — BODY MASS INDEX: 34.39 KG/M2 | WEIGHT: 268 LBS | HEIGHT: 74 IN

## 2023-08-01 DIAGNOSIS — M79.642 BILATERAL HAND PAIN: ICD-10-CM

## 2023-08-01 DIAGNOSIS — G56.03 BILATERAL CARPAL TUNNEL SYNDROME: Primary | ICD-10-CM

## 2023-08-01 DIAGNOSIS — M79.641 BILATERAL HAND PAIN: ICD-10-CM

## 2023-08-01 PROCEDURE — 73130 X-RAY EXAM OF HAND: CPT | Performed by: PHYSICIAN ASSISTANT

## 2023-08-01 PROCEDURE — 99213 OFFICE O/P EST LOW 20 MIN: CPT | Performed by: PHYSICIAN ASSISTANT

## 2023-08-01 PROCEDURE — 1126F AMNT PAIN NOTED NONE PRSNT: CPT | Performed by: PHYSICIAN ASSISTANT

## 2023-08-09 ENCOUNTER — PROCEDURE VISIT (OUTPATIENT)
Dept: NEUROLOGY | Facility: CLINIC | Age: 73
End: 2023-08-09
Payer: MEDICARE

## 2023-08-09 DIAGNOSIS — M79.604 BILATERAL LEG PAIN: ICD-10-CM

## 2023-08-09 DIAGNOSIS — M79.605 BILATERAL LEG PAIN: ICD-10-CM

## 2023-08-09 PROCEDURE — 95886 MUSC TEST DONE W/N TEST COMP: CPT | Performed by: OTHER

## 2023-08-09 PROCEDURE — 95909 NRV CNDJ TST 5-6 STUDIES: CPT | Performed by: OTHER

## 2023-08-11 NOTE — PROGRESS NOTES
Spoke to pt. Made aware of results & recommendations. Pt voiced understanding.   Provider information given to pt, pt requested medication for nerve pain, diazepam is not working  Per Dr. Manjeet Sidhu Gabapentin BID and report back in a weak in not effective so it can be titrated up  Pt voiced understanding

## 2023-08-22 ENCOUNTER — OFFICE VISIT (OUTPATIENT)
Dept: FAMILY MEDICINE CLINIC | Facility: CLINIC | Age: 73
End: 2023-08-22
Payer: MEDICARE

## 2023-08-22 VITALS
BODY MASS INDEX: 35.29 KG/M2 | OXYGEN SATURATION: 95 % | RESPIRATION RATE: 18 BRPM | TEMPERATURE: 98 F | WEIGHT: 275 LBS | HEIGHT: 74 IN | DIASTOLIC BLOOD PRESSURE: 54 MMHG | HEART RATE: 51 BPM | SYSTOLIC BLOOD PRESSURE: 130 MMHG

## 2023-08-22 DIAGNOSIS — T14.8XXA OPEN WOUND: Primary | ICD-10-CM

## 2023-08-22 PROCEDURE — 99213 OFFICE O/P EST LOW 20 MIN: CPT | Performed by: PHYSICIAN ASSISTANT

## 2023-08-22 RX ORDER — SULFAMETHOXAZOLE AND TRIMETHOPRIM 800; 160 MG/1; MG/1
1 TABLET ORAL 2 TIMES DAILY
Qty: 14 TABLET | Refills: 0 | Status: SHIPPED | OUTPATIENT
Start: 2023-08-22 | End: 2023-08-29

## 2023-08-22 NOTE — PATIENT INSTRUCTIONS
Soap and water or saline only  Bactrim  Stop all topical products  Keep clean dry and open to air when possible.

## 2023-08-24 ENCOUNTER — OFFICE VISIT (OUTPATIENT)
Dept: NEUROLOGY | Facility: CLINIC | Age: 73
End: 2023-08-24
Payer: MEDICARE

## 2023-08-24 VITALS
SYSTOLIC BLOOD PRESSURE: 112 MMHG | RESPIRATION RATE: 16 BRPM | HEIGHT: 74 IN | DIASTOLIC BLOOD PRESSURE: 52 MMHG | WEIGHT: 275 LBS | HEART RATE: 44 BPM | BODY MASS INDEX: 35.29 KG/M2

## 2023-08-24 DIAGNOSIS — G62.9 NEUROPATHY: Primary | ICD-10-CM

## 2023-08-24 PROCEDURE — 99214 OFFICE O/P EST MOD 30 MIN: CPT | Performed by: OTHER

## 2023-08-24 RX ORDER — GABAPENTIN 300 MG/1
CAPSULE ORAL
Qty: 90 CAPSULE | Refills: 2 | Status: SHIPPED | OUTPATIENT
Start: 2023-08-24

## 2023-08-24 RX ORDER — HYDRALAZINE HYDROCHLORIDE 50 MG/1
50 TABLET, FILM COATED ORAL EVERY MORNING
COMMUNITY

## 2023-08-29 ENCOUNTER — PROCEDURE VISIT (OUTPATIENT)
Dept: NEUROLOGY | Facility: CLINIC | Age: 73
End: 2023-08-29
Payer: MEDICARE

## 2023-08-29 DIAGNOSIS — G56.03 BILATERAL CARPAL TUNNEL SYNDROME: ICD-10-CM

## 2023-08-29 PROCEDURE — 95886 MUSC TEST DONE W/N TEST COMP: CPT | Performed by: OTHER

## 2023-08-29 PROCEDURE — 95912 NRV CNDJ TEST 11-12 STUDIES: CPT | Performed by: OTHER

## 2023-09-01 ENCOUNTER — PATIENT MESSAGE (OUTPATIENT)
Dept: NEUROLOGY | Facility: CLINIC | Age: 73
End: 2023-09-01

## 2023-09-05 ENCOUNTER — LAB ENCOUNTER (OUTPATIENT)
Dept: LAB | Age: 73
End: 2023-09-05
Attending: Other
Payer: MEDICARE

## 2023-09-05 DIAGNOSIS — G62.9 NEUROPATHY: ICD-10-CM

## 2023-09-05 DIAGNOSIS — G56.03 BILATERAL CARPAL TUNNEL SYNDROME: ICD-10-CM

## 2023-09-05 LAB
CRP SERPL-MCNC: <0.29 MG/DL (ref ?–0.3)
ERYTHROCYTE [SEDIMENTATION RATE] IN BLOOD: 11 MM/HR
RHEUMATOID FACT SERPL-ACNC: <10 IU/ML (ref ?–15)
VIT B12 SERPL-MCNC: 352 PG/ML (ref 193–986)

## 2023-09-05 PROCEDURE — 83521 IG LIGHT CHAINS FREE EACH: CPT

## 2023-09-05 PROCEDURE — 86225 DNA ANTIBODY NATIVE: CPT

## 2023-09-05 PROCEDURE — 36415 COLL VENOUS BLD VENIPUNCTURE: CPT

## 2023-09-05 PROCEDURE — 82607 VITAMIN B-12: CPT

## 2023-09-05 PROCEDURE — 86334 IMMUNOFIX E-PHORESIS SERUM: CPT

## 2023-09-05 PROCEDURE — 84165 PROTEIN E-PHORESIS SERUM: CPT

## 2023-09-05 PROCEDURE — 86431 RHEUMATOID FACTOR QUANT: CPT

## 2023-09-05 PROCEDURE — 86038 ANTINUCLEAR ANTIBODIES: CPT

## 2023-09-05 PROCEDURE — 86140 C-REACTIVE PROTEIN: CPT

## 2023-09-05 PROCEDURE — 85652 RBC SED RATE AUTOMATED: CPT

## 2023-09-07 LAB
DSDNA IGG SERPL IA-ACNC: <0.6 IU/ML
ENA AB SER QL IA: 0.2 UG/L
ENA AB SER QL IA: NEGATIVE

## 2023-09-08 LAB
ALBUMIN SERPL ELPH-MCNC: 4.31 G/DL (ref 3.75–5.21)
ALBUMIN/GLOB SERPL: 1.6 {RATIO} (ref 1–2)
ALPHA1 GLOB SERPL ELPH-MCNC: 0.23 G/DL (ref 0.19–0.46)
ALPHA2 GLOB SERPL ELPH-MCNC: 0.76 G/DL (ref 0.48–1.05)
B-GLOBULIN SERPL ELPH-MCNC: 0.71 G/DL (ref 0.68–1.23)
GAMMA GLOB SERPL ELPH-MCNC: 0.99 G/DL (ref 0.62–1.7)
KAPPA LC FREE SER-MCNC: 2.04 MG/DL (ref 0.33–1.94)
KAPPA LC FREE/LAMBDA FREE SER NEPH: 1.5 {RATIO} (ref 0.26–1.65)
LAMBDA LC FREE SERPL-MCNC: 1.36 MG/DL (ref 0.57–2.63)
PROT SERPL-MCNC: 7 G/DL (ref 6.4–8.2)

## 2023-09-22 ENCOUNTER — PATIENT MESSAGE (OUTPATIENT)
Dept: INTERNAL MEDICINE CLINIC | Facility: CLINIC | Age: 73
End: 2023-09-22

## 2023-09-22 NOTE — TELEPHONE ENCOUNTER
From: Linda Villas  To: Greta Simms  Sent: 2023 10:35 AM CDT  Subject: RE: RSV Vaccine    Dr. Land Manual:  Luna Warren the RSV vaccine today, please note my records. Thank you.   Devon Dickens

## 2023-10-16 ENCOUNTER — OFFICE VISIT (OUTPATIENT)
Dept: ORTHOPEDICS CLINIC | Facility: CLINIC | Age: 73
End: 2023-10-16
Payer: MEDICARE

## 2023-10-16 VITALS — WEIGHT: 275 LBS | BODY MASS INDEX: 35.29 KG/M2 | HEIGHT: 74 IN

## 2023-10-16 DIAGNOSIS — G56.03 BILATERAL CARPAL TUNNEL SYNDROME: Primary | ICD-10-CM

## 2023-10-16 DIAGNOSIS — G56.22 CUBITAL TUNNEL SYNDROME ON LEFT: ICD-10-CM

## 2023-10-16 PROCEDURE — 1126F AMNT PAIN NOTED NONE PRSNT: CPT | Performed by: ORTHOPAEDIC SURGERY

## 2023-10-16 PROCEDURE — 99214 OFFICE O/P EST MOD 30 MIN: CPT | Performed by: ORTHOPAEDIC SURGERY

## 2023-10-24 ENCOUNTER — OFFICE VISIT (OUTPATIENT)
Dept: NEUROLOGY | Facility: CLINIC | Age: 73
End: 2023-10-24

## 2023-10-24 ENCOUNTER — PATIENT MESSAGE (OUTPATIENT)
Dept: NEUROLOGY | Facility: CLINIC | Age: 73
End: 2023-10-24

## 2023-10-24 VITALS
DIASTOLIC BLOOD PRESSURE: 60 MMHG | RESPIRATION RATE: 16 BRPM | SYSTOLIC BLOOD PRESSURE: 144 MMHG | HEART RATE: 45 BPM | HEIGHT: 74 IN | BODY MASS INDEX: 35.29 KG/M2 | WEIGHT: 275 LBS

## 2023-10-24 DIAGNOSIS — G62.9 NEUROPATHY: Primary | ICD-10-CM

## 2023-10-24 PROCEDURE — 99214 OFFICE O/P EST MOD 30 MIN: CPT | Performed by: OTHER

## 2023-10-24 RX ORDER — GABAPENTIN 300 MG/1
600 CAPSULE ORAL 3 TIMES DAILY
Qty: 540 CAPSULE | Refills: 1 | Status: SHIPPED | OUTPATIENT
Start: 2023-10-24

## 2023-10-25 NOTE — TELEPHONE ENCOUNTER
Responded via [employer], advised to reach out to prescribing Dr for direction on holding ELIQUIS for XR LP.

## 2023-10-25 NOTE — TELEPHONE ENCOUNTER
From: Charlie Pressley  To: Esther Bejarano  Sent: 10/24/2023 9:13 PM CDT  Subject: RE: Lumbar Puncture    Dr. Valentin Contreras:  Sorry for the bother but my cardiologist, DURGA needs something from the provider doing the Lumbar Puncture. Might something from you suffice or do I run it by [employer]? Please see the attached. Thank you.   Nay Rodríguez

## 2023-10-26 ENCOUNTER — TELEPHONE (OUTPATIENT)
Dept: NEUROLOGY | Facility: CLINIC | Age: 73
End: 2023-10-26

## 2023-10-26 ENCOUNTER — LABORATORY ENCOUNTER (OUTPATIENT)
Dept: LAB | Age: 73
End: 2023-10-26
Attending: Other

## 2023-10-26 DIAGNOSIS — G62.9 NEUROPATHY: Primary | ICD-10-CM

## 2023-10-26 LAB — IMMUNOGLOBULIN PNL SER-MCNC: 1010 MG/DL (ref 791–1643)

## 2023-10-26 PROCEDURE — 86334 IMMUNOFIX E-PHORESIS SERUM: CPT

## 2023-10-26 PROCEDURE — 83521 IG LIGHT CHAINS FREE EACH: CPT

## 2023-10-26 PROCEDURE — 36415 COLL VENOUS BLD VENIPUNCTURE: CPT

## 2023-10-26 PROCEDURE — 82784 ASSAY IGA/IGD/IGG/IGM EACH: CPT

## 2023-10-26 PROCEDURE — 84165 PROTEIN E-PHORESIS SERUM: CPT

## 2023-10-26 PROCEDURE — 83516 IMMUNOASSAY NONANTIBODY: CPT

## 2023-10-26 PROCEDURE — 83520 IMMUNOASSAY QUANT NOS NONAB: CPT

## 2023-10-26 NOTE — TELEPHONE ENCOUNTER
Received request to fill out pre-operative cardiac risk assessment request form. Form filled and returned via fax.

## 2023-10-31 LAB
GD1B ANTIBODY, IGG: 4 IV
GD1B ANTIBODY, IGM: 3 IV
GM1 ANTIBODY, IGG: 3 IV
GM1 ANTIBODY, IGM: 3 IV
GQ1B ANTIBODY, IGG: 8 IV
GQ1B ANTIBODY, IGM: 3 IV
MAG IGM AUTOANTIBODIES: 1133 BTU

## 2023-11-01 DIAGNOSIS — E87.6 HYPOKALEMIA: ICD-10-CM

## 2023-11-01 RX ORDER — POTASSIUM CHLORIDE 1500 MG/1
1 TABLET, EXTENDED RELEASE ORAL DAILY
Qty: 90 TABLET | Refills: 0 | Status: SHIPPED | OUTPATIENT
Start: 2023-11-01 | End: 2023-11-02

## 2023-11-01 NOTE — TELEPHONE ENCOUNTER
Last time medication was refilled 7/25/23  Quantity and # of refills 90 w 0  Last OV 7/25/23  Next OV 1/26/24  Sent to Dr. Blessing Conley for approval.

## 2023-11-02 ENCOUNTER — HOSPITAL ENCOUNTER (OUTPATIENT)
Dept: GENERAL RADIOLOGY | Facility: HOSPITAL | Age: 73
Discharge: HOME | End: 2023-11-02
Attending: Other
Payer: MEDICARE

## 2023-11-02 VITALS — HEART RATE: 45 BPM | DIASTOLIC BLOOD PRESSURE: 67 MMHG | OXYGEN SATURATION: 94 % | SYSTOLIC BLOOD PRESSURE: 135 MMHG

## 2023-11-02 DIAGNOSIS — G62.9 NEUROPATHY: ICD-10-CM

## 2023-11-02 LAB
ALBUMIN SERPL ELPH-MCNC: 4.62 G/DL (ref 3.75–5.21)
ALBUMIN/GLOB SERPL: 1.79 {RATIO} (ref 1–2)
ALPHA1 GLOB SERPL ELPH-MCNC: 0.22 G/DL (ref 0.19–0.46)
ALPHA2 GLOB SERPL ELPH-MCNC: 0.76 G/DL (ref 0.48–1.05)
B-GLOBULIN SERPL ELPH-MCNC: 0.68 G/DL (ref 0.68–1.23)
BASOPHILS NFR CSF: 0 %
COLOR CSF: COLORLESS
EOSINOPHIL NFR CSF: 0 %
GAMMA GLOB SERPL ELPH-MCNC: 0.92 G/DL (ref 0.62–1.7)
GLUCOSE CSF-MCNC: 58 MG/DL (ref 40–70)
KAPPA LC FREE SER-MCNC: 0.78 MG/DL (ref 0.33–1.94)
KAPPA LC FREE/LAMBDA FREE SER NEPH: 1.74 {RATIO} (ref 0.26–1.65)
LAMBDA LC FREE SERPL-MCNC: 0.45 MG/DL (ref 0.57–2.63)
LYMPHOCYTES NFR CSF: 65 % (ref 40–80)
MONOS+MACROS NFR CSF: 35 % (ref 15–45)
NEUTROPHILS NFR CSF: 0 % (ref 0–6)
PROT PATTERN CSF ELPH-IMP: 67.4 MG/DL (ref 15–45)
PROT SERPL-MCNC: 7.2 G/DL (ref 5.7–8.2)
RBC # CSF: 11 /CUMM (ref ?–1)
TOTAL CELLS COUNTED CSF: 4 /CUMM (ref 0–5)
TOTAL CELLS COUNTED FLD: 100
TOTAL VOLUME CSF: 19 ML
TUBE # CSF: 3
TURBIDITY CSF QL: CLEAR
WBC CALC (IRIS) CSF: 4 /CUMM

## 2023-11-02 PROCEDURE — 82164 ANGIOTENSIN I ENZYME TEST: CPT

## 2023-11-02 PROCEDURE — 82945 GLUCOSE OTHER FLUID: CPT

## 2023-11-02 PROCEDURE — 82784 ASSAY IGA/IGD/IGG/IGM EACH: CPT

## 2023-11-02 PROCEDURE — 89050 BODY FLUID CELL COUNT: CPT

## 2023-11-02 PROCEDURE — 62328 DX LMBR SPI PNXR W/FLUOR/CT: CPT | Performed by: OTHER

## 2023-11-02 PROCEDURE — 88108 CYTOPATH CONCENTRATE TECH: CPT

## 2023-11-02 PROCEDURE — 89051 BODY FLUID CELL COUNT: CPT

## 2023-11-02 PROCEDURE — 84157 ASSAY OF PROTEIN OTHER: CPT

## 2023-11-02 RX ORDER — LIDOCAINE HYDROCHLORIDE 10 MG/ML
INJECTION, SOLUTION EPIDURAL; INFILTRATION; INTRACAUDAL; PERINEURAL
Status: COMPLETED
Start: 2023-11-02 | End: 2023-11-02

## 2023-11-02 RX ORDER — LIDOCAINE HYDROCHLORIDE 10 MG/ML
5 INJECTION, SOLUTION EPIDURAL; INFILTRATION; INTRACAUDAL; PERINEURAL ONCE
Status: COMPLETED | OUTPATIENT
Start: 2023-11-02 | End: 2023-11-02

## 2023-11-02 RX ADMIN — LIDOCAINE HYDROCHLORIDE 5 ML: 10 INJECTION, SOLUTION EPIDURAL; INFILTRATION; INTRACAUDAL; PERINEURAL at 12:30:00

## 2023-11-02 NOTE — IMAGING NOTE
1218 Pt returned to [facility] on cart w/rad tech Max's escort. Needle out time 1156, Dr Alyssa rai MD. See VS Flowsheet. Unable to reach pt's wife, left vm re:picking pt up in 1 hr. Bandaid lumbar spine clean, dry, intact, no bruising/ecchymosis at site. Pt w/headache, took his home med Gabapentin at 1220.     1356 Pt discharged in wheelchair by KLAUDIA Collins RN, to wife's car, discharge instructions were given by RODRIGUEZ Palacios, pt demonstrated good understanding. Pt had water during stay, declined snack. [facility] CLINIC PATIENT INSTRUCTIONS    1.) Return to [facility] clinic in 6-8 weeks.     2.) Please have labs drawn prior to your next clinic appointment. The lab is located at [facility] which is located on the 1st floor of the Outpatient Pavilion.     3.) Please make the following medication changes     -DECREASE Hydralazine to 37.5MG, 1 1/2 tablets, three times a day    4.) Please have CBC, CMP, MG, LDH and INR Prior to next clinic visit.  All lab results should be faxed to VAD team at Fax [phone]. Please call critical results to VAD Team at Ph. [phone].    5.) Monitor daily weight, temperature and VAD parameters. Follow a No added salt, low sodium diet.     6.) Please call VAD pager at ph. [phone] and ask to speak with VAD Coordinator on call for any of the following:     -Chest pain   -Worsening shortness of breath   -Weight gain greater than 2 pounds in 1 day-OR- 5 pounds in 1 week   -ICD shock   -Palpitations, lightheadedness or dizziness   -Blood in urine or stool   -Bloody nose  -Headache, vision changes, signs of stroke which include any of the following: one sided weakness, numbness or tingling, difficulty speaking or swallowing, facial droop, confusion, disorientation, altered responsiveness     -Fever or chills, Temp greater than 100   -Signs of driveline infection which include redness, drainage or pain    -Trauma to driveline   -ANY RED or flashing yellow VAD alarms   -If you need to change to back up    -Call for VAD flows less than 3 and greater than 8   -Call for pump power increase 2 davis above baseline

## 2023-11-02 NOTE — IMAGING NOTE
12 Pt arrived to [facility] w/Rad tech Max. Med rec/allergies reviewed. Last intake 4:30-5 am.      1101 pt to rad dept escorted by Jessica Hardy Rad megan.

## 2023-11-02 NOTE — DISCHARGE INSTRUCTIONS
Procedural Physician: Dr. Estefani Garcia    Travel/Activity  Go home and rest quietly until the next day. Sleep in the supine position  Avoid frequent repositioning  You may get up to use the restroom  Do not drive today. Resume light activity tomorrow. You may return to work tomorrow. Diet  6. No dietary restrictions. Drink extra liquids today; one cup every hour for eight hours. Medications  7. You may resume any prescription medications taken before the lumbar puncture. However, DO NOT take aspirin, motrin, ibuprofen, or any medications containing NSAIDS for 24 hours. 8.  Contact attending physician for resumption of anticoagulants, coumadin, and/or aspirin therapy. 9.  Continue to hold medications that you were instructed to hold over the last 48 hours for an additional 24 hours. Side Effects  10. Most common - Headaches    Headaches that occur within the first few hours can be treated with tylenol and positioning. Headaches that occur over the next 24 hours (second day) and last for several days are referred to as \"Spinal Headaches\". *Symptoms are usually headache pain, eye discomfort, and nausea. Position change from a lying to a sitting or standing position usually increases the discomfort. First line of treatment is conservative care: Return to bed rest; lie flat, start Tylenol every 4 hours. Drink fluid containing caffeine after 24 hours. If symptoms are reduced or manageable, continue this plan of care. If headache is persistent after 24 to 48 hours, notify the ordering physician. If your headache is extreme and persistent, go to your local emergency department, explain the procedure you had and the symptoms you are experiencing. Treatment will be provided accordingly. Pain at the injection site is quite common and usually resolves itself over 24 - 48 hours. Continue treatment with Tylenol. Call your physician for any further questions, problems, or test results.  They may contact the Radiology Department for further information by asking to speak with a Radiology Nurse at [phone].

## 2023-11-03 LAB
ACE, CSF: <1.5 U/L
ALBUMIN CSF: 38 MG/DL
ALBUMIN: 4.1 G/DL
CSF IGG INDEX: 0.5
IGG CSF: 4.1 MG/DL
IGG/ALB RATIO CSF: 0.11
IGG: 929 MG/DL

## 2023-11-07 ENCOUNTER — LAB ENCOUNTER (OUTPATIENT)
Dept: LAB | Age: 73
End: 2023-11-07
Attending: Other
Payer: MEDICARE

## 2023-11-07 DIAGNOSIS — G62.9 NEUROPATHY: ICD-10-CM

## 2023-11-07 LAB — IMMUNOGLOBULIN PNL SER-MCNC: 1020 MG/DL (ref 791–1643)

## 2023-11-07 PROCEDURE — 82784 ASSAY IGA/IGD/IGG/IGM EACH: CPT

## 2023-11-07 PROCEDURE — 36415 COLL VENOUS BLD VENIPUNCTURE: CPT

## 2023-11-13 ENCOUNTER — TELEPHONE (OUTPATIENT)
Dept: NEUROLOGY | Facility: CLINIC | Age: 73
End: 2023-11-13

## 2023-11-13 NOTE — TELEPHONE ENCOUNTER
Patient calling, advised that he had an episode of severe spasms after hanging up [employer] lights this past week. Notes that with increased activity, has been having more breakthroughs of spasms, without any break in taking gabapentin.     Please advise possible medication adjustments

## 2023-11-13 NOTE — TELEPHONE ENCOUNTER
After using ladder to hang [employer] lights, (multiple trips up/down 4-5 steps of ladder), patient experienced severe spasms in the night.Bilateral, but primarily Left leg, groin to knee.  Episode lasted 2-3 minutes, resolved by walking.  Did take two TYLENOL, not convinced they made any difference.  For the rest of the am, legs felt \"fatigued'.    Is taking all medications as prescribed with no other symptoms.  Of note, patient generally manages spasms with cutting back on activity, but states \"there are things I still want to do\".    Next OV 1/24/2023.    See alternate TE on 11/13 regarding lab results and plan of care.    Routed to provider.      Current Outpatient Medications:     Coenzyme Q10-Vitamin E (QUNOL ULTRA COQ10 OR), Take 100 mg by mouth daily., Disp: , Rfl:     gabapentin 300 MG Oral Cap, Take 2 capsules (600 mg total) by mouth in the morning, at noon, and at bedtime., Disp: 540 capsule, Rfl: 1    hydrALAZINE 50 MG Oral Tab, Take 1 tablet (50 mg total) by mouth every morning. Take with 100mg in AM, Disp: , Rfl:     labetalol 200 MG Oral Tab, Take 1 tablet (200 mg total) by mouth 2 (two) times daily., Disp: , Rfl:     hydrALAZINE 100 MG Oral Tab, Take 1 tablet (100 mg total) by mouth 3 (three) times daily., Disp: , Rfl:     Ciclopirox (CICLODAN) 8 % External Solution, Apply topically nightly., Disp: , Rfl:     torsemide 20 MG Oral Tab, Take 1 tablet (20 mg total) by mouth in the morning and 1 tablet (20 mg total) before bedtime., Disp: , Rfl:     spironolactone 25 MG Oral Tab, Take 1 tablet (25 mg total) by mouth daily., Disp: , Rfl:     Fluocinolone Acetonide 0.01 % Otic Oil, INSTILL 5 DROPS INTO BOTH EARS TWICE DAILY AS NEEDED, Disp: , Rfl:     atorvastatin 40 MG Oral Tab, Take 1 tablet (40 mg total) by mouth nightly., Disp: , Rfl:     apixaban 5 MG Oral Tab, Take by mouth 2 (two) times daily., Disp: , Rfl:     Meloxicam 15 MG Oral Tab, Take 1 tablet (15 mg total) by mouth daily as needed., Disp: ,  Rfl:

## 2023-11-13 NOTE — TELEPHONE ENCOUNTER
Patient calling, advised that he completed LP and associated bloodwork. Patient would like to know if Dr. Cox has a diagnosis and also if there is a proposed treatment plan.     Please advise.

## 2023-11-17 ENCOUNTER — TELEPHONE (OUTPATIENT)
Dept: NEUROLOGY | Facility: CLINIC | Age: 73
End: 2023-11-17

## 2023-11-17 DIAGNOSIS — M54.2 NECK PAIN: Primary | ICD-10-CM

## 2023-11-17 DIAGNOSIS — R29.6 FREQUENT FALLS: ICD-10-CM

## 2023-11-17 DIAGNOSIS — R29.898 WEAKNESS OF BOTH HANDS: ICD-10-CM

## 2023-11-17 NOTE — TELEPHONE ENCOUNTER
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

## 2023-11-27 ENCOUNTER — HOSPITAL ENCOUNTER (OUTPATIENT)
Dept: MRI IMAGING | Facility: HOSPITAL | Age: 73
Discharge: HOME | End: 2023-11-27
Attending: Other
Payer: MEDICARE

## 2023-11-27 DIAGNOSIS — R29.6 FREQUENT FALLS: ICD-10-CM

## 2023-11-27 DIAGNOSIS — R29.898 WEAKNESS OF BOTH HANDS: ICD-10-CM

## 2023-11-27 DIAGNOSIS — M54.2 NECK PAIN: ICD-10-CM

## 2023-11-27 PROCEDURE — 72141 MRI NECK SPINE W/O DYE: CPT | Performed by: OTHER

## 2023-11-28 ENCOUNTER — LAB ENCOUNTER (OUTPATIENT)
Dept: LAB | Age: 73
End: 2023-11-28
Attending: INTERNAL MEDICINE
Payer: MEDICARE

## 2023-11-28 DIAGNOSIS — R06.02 SOB (SHORTNESS OF BREATH): ICD-10-CM

## 2023-11-28 DIAGNOSIS — E78.5 HYPERLIPIDEMIA: Primary | ICD-10-CM

## 2023-11-28 DIAGNOSIS — I10 HYPERTENSION: ICD-10-CM

## 2023-11-28 LAB
ALBUMIN SERPL-MCNC: 4 G/DL (ref 3.4–5)
ALBUMIN/GLOB SERPL: 1.2 {RATIO} (ref 1–2)
ALP LIVER SERPL-CCNC: 66 U/L
ALT SERPL-CCNC: 53 U/L
ANION GAP SERPL CALC-SCNC: 6 MMOL/L (ref 0–18)
AST SERPL-CCNC: 45 U/L (ref 15–37)
BILIRUB SERPL-MCNC: 0.7 MG/DL (ref 0.1–2)
BUN BLD-MCNC: 31 MG/DL (ref 9–23)
CALCIUM BLD-MCNC: 9.3 MG/DL (ref 8.5–10.1)
CHLORIDE SERPL-SCNC: 106 MMOL/L (ref 98–112)
CHOLEST SERPL-MCNC: 144 MG/DL (ref ?–200)
CO2 SERPL-SCNC: 29 MMOL/L (ref 21–32)
CREAT BLD-MCNC: 1.39 MG/DL
EGFRCR SERPLBLD CKD-EPI 2021: 54 ML/MIN/1.73M2 (ref 60–?)
FASTING PATIENT LIPID ANSWER: YES
FASTING STATUS PATIENT QL REPORTED: YES
GLOBULIN PLAS-MCNC: 3.4 G/DL (ref 2.8–4.4)
GLUCOSE BLD-MCNC: 102 MG/DL (ref 70–99)
HDLC SERPL-MCNC: 64 MG/DL (ref 40–59)
LDLC SERPL CALC-MCNC: 68 MG/DL (ref ?–100)
NONHDLC SERPL-MCNC: 80 MG/DL (ref ?–130)
OSMOLALITY SERPL CALC.SUM OF ELEC: 299 MOSM/KG (ref 275–295)
POTASSIUM SERPL-SCNC: 3.7 MMOL/L (ref 3.5–5.1)
PROT SERPL-MCNC: 7.4 G/DL (ref 6.4–8.2)
SODIUM SERPL-SCNC: 141 MMOL/L (ref 136–145)
TRIGL SERPL-MCNC: 54 MG/DL (ref 30–149)
VLDLC SERPL CALC-MCNC: 8 MG/DL (ref 0–30)

## 2023-11-28 PROCEDURE — 80053 COMPREHEN METABOLIC PANEL: CPT

## 2023-11-28 PROCEDURE — 80061 LIPID PANEL: CPT

## 2023-11-28 PROCEDURE — 36415 COLL VENOUS BLD VENIPUNCTURE: CPT

## 2023-11-29 ENCOUNTER — OFFICE VISIT (OUTPATIENT)
Facility: LOCATION | Age: 73
End: 2023-11-29
Payer: MEDICARE

## 2023-11-29 DIAGNOSIS — R04.0 EPISTAXIS: Primary | ICD-10-CM

## 2023-11-29 DIAGNOSIS — D68.9 COAGULOPATHY (HCC): ICD-10-CM

## 2023-11-29 PROCEDURE — 99213 OFFICE O/P EST LOW 20 MIN: CPT | Performed by: OTOLARYNGOLOGY

## 2023-11-29 PROCEDURE — 30901 CONTROL OF NOSEBLEED: CPT | Performed by: OTOLARYNGOLOGY

## 2023-11-29 NOTE — PROGRESS NOTES
Cinthia Cai is a 72year old male. No chief complaint on file. HPI:   Patient has a history of epistaxis. He has had more episodes on the left side and he uses Eliquis. Right side had been bleeding less than the left. He usually applies pressure to stop them. He has no history of family familial bleeding disorder. He stopped his Eliquis 5 days ago. Current Outpatient Medications   Medication Sig Dispense Refill    Coenzyme Q10-Vitamin E (QUNOL ULTRA COQ10 OR) Take 100 mg by mouth daily. gabapentin 300 MG Oral Cap Take 2 capsules (600 mg total) by mouth in the morning, at noon, and at bedtime. 540 capsule 1    hydrALAZINE 50 MG Oral Tab Take 1 tablet (50 mg total) by mouth every morning. Take with 100mg in AM      labetalol 200 MG Oral Tab Take 1 tablet (200 mg total) by mouth 2 (two) times daily. hydrALAZINE 100 MG Oral Tab Take 1 tablet (100 mg total) by mouth 3 (three) times daily. Ciclopirox (CICLODAN) 8 % External Solution Apply topically nightly. torsemide 20 MG Oral Tab Take 1 tablet (20 mg total) by mouth in the morning and 1 tablet (20 mg total) before bedtime. spironolactone 25 MG Oral Tab Take 1 tablet (25 mg total) by mouth daily. Fluocinolone Acetonide 0.01 % Otic Oil INSTILL 5 DROPS INTO BOTH EARS TWICE DAILY AS NEEDED      atorvastatin 40 MG Oral Tab Take 1 tablet (40 mg total) by mouth nightly. apixaban 5 MG Oral Tab Take by mouth 2 (two) times daily. Meloxicam 15 MG Oral Tab Take 1 tablet (15 mg total) by mouth daily as needed.         Past Medical History:   Diagnosis Date    AAA (abdominal aortic aneurysm) ([facility] [address]     Allergic rhinitis     Anesthesia complication     Wakes up violent    Arrhythmia     afib    Cataract     Complex sleep apnea syndrome 2016    Diverticulosis of large intestine     Essential hypertension     High blood pressure     Osteoarthritis     Sleep apnea     Umbilical hernia without obstruction or gangrene       Social History:  Social History     Socioeconomic History    Marital status:    Tobacco Use    Smoking status: Former     Packs/day: 1.00     Years: 20.00     Additional pack years: 0.00     Total pack years: 20.00     Types: Cigarettes     Quit date: 2001     Years since quittin.3    Smokeless tobacco: Never    Tobacco comments:     Quit 20+ years ago per pt   Vaping Use    Vaping Use: Never used   Substance and Sexual Activity    Alcohol use: Yes     Comment: OCC    Drug use: Never      Past Surgical History:   Procedure Laterality Date    ARTHROSCOPY OF JOINT UNLISTED Right     Meniscectomy    OTHER      Nasal surgery    OTHER      Hemorrhoidectomy and fistula repair    TONSILLECTOMY      TOTAL HIP REPLACEMENT Left          REVIEW OF SYSTEMS:   GENERAL HEALTH: feels well otherwise  GENERAL : denies fever, chills, sweats, weight loss, weight gain  SKIN: denies any unusual skin lesions or rashes  RESPIRATORY: denies shortness of breath with exertion  NEURO: denies headaches    EXAM:   There were no vitals taken for this visit. System Findings Details   Skin Normal Inspection - Normal.   Constitutional Normal Overall appearance - Normal.   Head/Face Normal Facial features - Normal. Eyebrows - Normal. Skull - Normal.   Oral/Oropharynx Normal Lips - Normal, Tonsils - Normal, Tongue - Normal    Nasal Normal External nose - Normal.  Patient was sprayed with Afrin lidocaine examination showed small Serafin changes on the right side. Left side shows more excoriation in the left [place] box area. The left side was sprayed with Afrin lidocaine and then cauterized and the most inferior area with silver nitrate and then bacitracin was applied. Neurological Normal Memory - Normal. Cranial nerves - Cranial nerves II through XII grossly intact.    Neck Exam Normal Inspection - Normal. Palpation - Normal. Parotid gland - Normal. Thyroid gland - Normal.   Psychiatric Normal Orientation - Oriented to time, place, person & situation. Appropriate mood and affect. Lymph Detail Normal Submental. Submandibular. Anterior cervical. Posterior cervical. Supraclavicular. Eyes Normal Conjunctiva - Right: Normal, Left: Normal. Pupil - Right: Normal, Left: Normal.    Ears Normal Inspection - Right: Normal, Left: Normal. Canal - Left: Normal. TM - Right: Normal, Left: Normal.     ASSESSMENT AND PLAN:   1. Epistaxis  He was cauterized today on the left side of the nose. He will use bacitracin on that side. On the right side he will use AYR gel to moisturize that side. If bleeding would worsen he will return. 2. Coagulopathy ([address]  He may resume his Eliquis tomorrow. The patient indicates understanding of these issues and agrees to the plan.       Hartford Heimlich, MD  11/29/2023  11:57 AM

## 2023-12-04 ENCOUNTER — TELEPHONE (OUTPATIENT)
Dept: INTERNAL MEDICINE CLINIC | Facility: CLINIC | Age: 73
End: 2023-12-04

## 2023-12-04 NOTE — TELEPHONE ENCOUNTER
Spoke with pharmacy, needed to clarify drug interaction between Eliquis and Paxlovid  Ok per Marquita PALOMO to take both meds together  Understanding verbalized

## 2023-12-04 NOTE — TELEPHONE ENCOUNTER
Drug clarification   kayode wants to speak with a nurse regarding nirmatrelvir-ritonavir 150-100 MG Oral Tablet Therapy Pack there is a drug interaction with a current Med.

## 2023-12-07 ENCOUNTER — TELEPHONE (OUTPATIENT)
Dept: NEUROLOGY | Facility: CLINIC | Age: 73
End: 2023-12-07

## 2023-12-07 NOTE — TELEPHONE ENCOUNTER
RN spoke to Dr. Cox, he would like the patient to come in for an office visit to be assessed. RN called the patient and he is scheduled for next Tuesday at 9:40.

## 2023-12-07 NOTE — TELEPHONE ENCOUNTER
Patient reports last night he had a bad attack with his legs. Per patient he is currently taking Gabapentin 600mg TID. Patient stated the Gabapentin was not working at all.     Per patient he is finishing his last dose of Paxlovid Tomorrow. Patient is looking for advise what to do regarding his situation. Please advise.

## 2023-12-12 ENCOUNTER — TELEPHONE (OUTPATIENT)
Dept: HEMATOLOGY/ONCOLOGY | Facility: HOSPITAL | Age: 73
End: 2023-12-12

## 2023-12-12 ENCOUNTER — OFFICE VISIT (OUTPATIENT)
Dept: NEUROLOGY | Facility: CLINIC | Age: 73
End: 2023-12-12
Payer: MEDICARE

## 2023-12-12 VITALS
RESPIRATION RATE: 16 BRPM | SYSTOLIC BLOOD PRESSURE: 136 MMHG | WEIGHT: 275 LBS | BODY MASS INDEX: 35.29 KG/M2 | HEART RATE: 54 BPM | HEIGHT: 74 IN | DIASTOLIC BLOOD PRESSURE: 53 MMHG

## 2023-12-12 DIAGNOSIS — R29.6 FREQUENT FALLS: Primary | ICD-10-CM

## 2023-12-12 DIAGNOSIS — G62.9 NEUROPATHY: ICD-10-CM

## 2023-12-12 DIAGNOSIS — R29.898 WEAKNESS OF BOTH HANDS: ICD-10-CM

## 2023-12-12 DIAGNOSIS — G61.89 POLYNEUROPATHY ASSOCIATED WITH MONOCLONAL IGM ANTIBODIES TO MYELIN-ASSOCIATED GLYCOPROTEIN (HCC): ICD-10-CM

## 2023-12-12 DIAGNOSIS — R76.8 ELEVATED SERUM IMMUNOGLOBULIN FREE LIGHT CHAIN LEVEL: ICD-10-CM

## 2023-12-12 PROCEDURE — 99214 OFFICE O/P EST MOD 30 MIN: CPT | Performed by: OTHER

## 2023-12-12 RX ORDER — GABAPENTIN 300 MG/1
900 CAPSULE ORAL 3 TIMES DAILY
Qty: 810 CAPSULE | Refills: 0 | Status: SHIPPED | OUTPATIENT
Start: 2023-12-12 | End: 2024-03-11

## 2023-12-12 NOTE — TELEPHONE ENCOUNTER
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

## 2023-12-26 ENCOUNTER — OFFICE VISIT (OUTPATIENT)
Dept: HEMATOLOGY/ONCOLOGY | Facility: HOSPITAL | Age: 73
End: 2023-12-26
Attending: INTERNAL MEDICINE
Payer: MEDICARE

## 2023-12-26 VITALS
WEIGHT: 276 LBS | DIASTOLIC BLOOD PRESSURE: 103 MMHG | OXYGEN SATURATION: 95 % | RESPIRATION RATE: 16 BRPM | SYSTOLIC BLOOD PRESSURE: 173 MMHG | BODY MASS INDEX: 35.42 KG/M2 | TEMPERATURE: 98 F | HEART RATE: 54 BPM | HEIGHT: 74 IN

## 2023-12-26 DIAGNOSIS — R77.8 ABNORMAL SPEP: Primary | ICD-10-CM

## 2023-12-26 DIAGNOSIS — G62.89 NEUROPATHY ASSOCIATED WITH ANTI-MAG ANTIBODY: ICD-10-CM

## 2023-12-26 LAB
IGA SERPL-MCNC: 134 MG/DL (ref 70–312)
IGM SERPL-MCNC: 35.1 MG/DL (ref 43–279)
IMMUNOGLOBULIN PNL SER-MCNC: 890 MG/DL (ref 791–1643)

## 2023-12-26 PROCEDURE — 99205 OFFICE O/P NEW HI 60 MIN: CPT | Performed by: INTERNAL MEDICINE

## 2023-12-26 NOTE — PROGRESS NOTES
Education Record    Learner:  Patient    Disease / Diagnosis: Consult    Barriers / Limitations:  None   Comments:    Method:  Discussion   Comments:    General Topics:  Medication and Plan of care reviewed   Comments:    Outcome:  Shows understanding   Comments:    Here for new consult-  neuropathy and falls due to neuropathy. EMG was done. Bloodwork was done.

## 2024-01-06 DIAGNOSIS — G62.9 NEUROPATHY: ICD-10-CM

## 2024-01-08 ENCOUNTER — TELEPHONE (OUTPATIENT)
Dept: NEUROLOGY | Facility: CLINIC | Age: 74
End: 2024-01-08

## 2024-01-08 DIAGNOSIS — G62.9 NEUROPATHY: ICD-10-CM

## 2024-01-08 RX ORDER — GABAPENTIN 300 MG/1
CAPSULE ORAL
Qty: 630 CAPSULE | Refills: 1 | Status: CANCELLED | OUTPATIENT
Start: 2024-01-08

## 2024-01-08 RX ORDER — GABAPENTIN 300 MG/1
CAPSULE ORAL
Qty: 630 CAPSULE | Refills: 1 | Status: SHIPPED | OUTPATIENT
Start: 2024-01-08

## 2024-01-08 NOTE — TELEPHONE ENCOUNTER
Patient reports he has been cleared for IVIG from Hem/Onc. Please advise if you would like us to move forward with IVIG.

## 2024-01-08 NOTE — TELEPHONE ENCOUNTER
Medication: gabapentin 300 MG      Date of last refill: 12/12/23 (#810/0R)  [place]/A     Last office visit: 12/12/23  Due back to clinic per last office note:  4 mon  Date next office visit scheduled:    Future Appointments   Date Time Provider Department Center   1/26/2024 10:30 AM Hayder John MD [facility] [address]   4/12/2024 10:40 AM Mode Cox MD [facility]           Last OV note recommendation:    Impression/ Plan:  Wil Llaons is a 73 year old male who originally presented 8/24/2023 for evaluation of multiple complaints.  He started to have cramping in both legs in the past 6 months.  He also had numbness in toes 3 yrs ago.  He states he intermittently is having \"locking up\" of hands with thumb moving inward on R or L side - states this has been in the past few weeks.       Neurologic exam shows decreased sensation in both feet with depressed DTRs distally as well as reduced sensation in UE to vibration. In addition, there is some weakness in APB muscles in UE bilaterally. Signs/symptoms are overall concerning for neuropathy.  In addition, NCS/EMG done of both legs showed some asymmetry with absent sural sensory response on left side and present but reduced on R side. Tibial motor response showed bilateral temporal dispersion proximally and this could suggest an atypical neuropathy or be due to artifact.       In order to further evaluate, NCS/EMG of both upper extremities was done and showed evidence for sensory neuropathy with axonal and demyelinating features along with median entrapment neuropathy across the right wrist, suggestive of carpal tunnel syndrome, which is mild.  Labs were done and showed mild elevation in kappa free light chain but normal ratio and borderline B12 level with normal ESR, CRP, RF.  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  Patient was advised of potential side effects, including but not limited to rash, excessive sedation, weight gain and/or peripheral edema; patient was advised to notify office with any new or worsening symptoms.     1. Frequent falls  As noted above   - Oncology/Hematology Referral - In Network     2. Weakness of both hands  As noted above   - Oncology/Hematology Referral - In Network     3. Neuropathy  As noted above  - Oncology/Hematology Referral - In Network  - gabapentin 300 MG Oral Cap; Take 3 capsules (900 mg total) by mouth in the morning, at noon, and at bedtime.  Dispense: 810 capsule; Refill: 0     4. 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 Elevated serum immunoglobulin free light chain level  As noted above   - Oncology/Hematology Referral - In Network

## 2024-01-09 NOTE — TELEPHONE ENCOUNTER
Based on chart review, has recent recurrent infection and on new Abx -  would wait to start ~ 1 month - plan for IVIG - usually recommend to come in to hospital for first induction to see if tolerating (2 g / kg total - 0.4 g/kg over 5 days).  Then start maintenance therapy after this time

## 2024-01-26 ENCOUNTER — OFFICE VISIT (OUTPATIENT)
Dept: INTERNAL MEDICINE CLINIC | Facility: CLINIC | Age: 74
End: 2024-01-26
Payer: MEDICARE

## 2024-01-26 VITALS
SYSTOLIC BLOOD PRESSURE: 135 MMHG | HEIGHT: 74 IN | RESPIRATION RATE: 16 BRPM | DIASTOLIC BLOOD PRESSURE: 70 MMHG | HEART RATE: 48 BPM | WEIGHT: 275 LBS | OXYGEN SATURATION: 95 % | BODY MASS INDEX: 35.29 KG/M2

## 2024-01-26 DIAGNOSIS — E78.2 MIXED HYPERLIPIDEMIA: ICD-10-CM

## 2024-01-26 DIAGNOSIS — I48.0 PAROXYSMAL ATRIAL FIBRILLATION (HCC): ICD-10-CM

## 2024-01-26 DIAGNOSIS — I10 ESSENTIAL HYPERTENSION: Primary | ICD-10-CM

## 2024-01-26 NOTE — PROGRESS NOTES
Subjective:   Patient ID: Jose A Llanos is a 73 year old male.    Hypertension    Jose A Llanos is a 73 year old male.     HPI:   Patient presents for recheck of his hypertension, HLD and PAF Pt has been taking medications as instructed, no medication side effects, home BP monitoring in the range of 130's systolic and 70's diastolic.    Wt Readings from Last 6 Encounters:   01/26/24 275 lb (124.7 kg)   12/26/23 276 lb (125.2 kg)   12/12/23 275 lb (124.7 kg)   10/24/23 275 lb (124.7 kg)   10/16/23 275 lb (124.7 kg)   08/24/23 275 lb (124.7 kg)     Body mass index is 35.31 kg/m².    Lab Results   Component Value Date    CHOLEST 144 11/28/2023    CHOLEST 146 02/22/2023    CHOLEST 131 02/08/2022     Lab Results   Component Value Date    HDL 64 (H) 11/28/2023    HDL 60 (H) 02/22/2023    HDL 53 02/08/2022     Lab Results   Component Value Date    TRIG 54 11/28/2023    TRIG 96 02/22/2023    TRIG 79 02/08/2022     Lab Results   Component Value Date    LDL 68 11/28/2023    LDL 68 02/22/2023    LDL 62 02/08/2022     Lab Results   Component Value Date    AST 45 (H) 11/28/2023    AST 39 (H) 06/01/2023    AST 65 (H) 03/01/2023     Lab Results   Component Value Date    ALT 53 11/28/2023    ALT 42 06/01/2023    ALT 51 03/01/2023     Lab Results   Component Value Date     (H) 11/28/2023     (H) 06/01/2023     (H) 03/01/2023       Current Outpatient Medications   Medication Sig Dispense Refill    gabapentin 300 MG Oral Cap Take 600mg in AM, 600mg at Noon, 900mg  capsule 1    Coenzyme Q10-Vitamin E (QUNOL ULTRA COQ10 OR) Take 100 mg by mouth daily.      hydrALAZINE 50 MG Oral Tab Take 1 tablet (50 mg total) by mouth every morning. Take with 100mg in AM      labetalol 200 MG Oral Tab Take 1 tablet (200 mg total) by mouth 2 (two) times daily.      hydrALAZINE 100 MG Oral Tab Take 1 tablet (100 mg total) by mouth 3 (three) times daily.      Ciclopirox (CICLODAN) 8 % External Solution Apply topically  nightly.      torsemide 20 MG Oral Tab Take 1 tablet (20 mg total) by mouth in the morning and 1 tablet (20 mg total) before bedtime.      spironolactone 25 MG Oral Tab Take 1 tablet (25 mg total) by mouth daily.      Fluocinolone Acetonide 0.01 % Otic Oil INSTILL 5 DROPS INTO BOTH EARS TWICE DAILY AS NEEDED      atorvastatin 40 MG Oral Tab Take 1 tablet (40 mg total) by mouth nightly.      apixaban 5 MG Oral Tab Take by mouth 2 (two) times daily.        Past Medical History:   Diagnosis Date    AAA (abdominal aortic aneurysm) (HCC)     Allergic rhinitis     Anesthesia complication     Wakes up violent    Arrhythmia     afib    Cataract     Complex sleep apnea syndrome 2016    Diverticulosis of large intestine     Essential hypertension     High blood pressure     Osteoarthritis     Sleep apnea     Umbilical hernia without obstruction or gangrene       Past Surgical History:   Procedure Laterality Date    ARTHROSCOPY OF JOINT UNLISTED Right     Meniscectomy    OTHER      Nasal surgery    OTHER      Hemorrhoidectomy and fistula repair    TONSILLECTOMY      TOTAL HIP REPLACEMENT Left       Social History:    Social History     Socioeconomic History    Marital status:    Tobacco Use    Smoking status: Former     Packs/day: 1.00     Years: 20.00     Additional pack years: 0.00     Total pack years: 20.00     Types: Cigarettes     Quit date: 2001     Years since quittin.5     Passive exposure: Never    Smokeless tobacco: Never    Tobacco comments:     Quit 20+ years ago per pt   Vaping Use    Vaping Use: Never used   Substance and Sexual Activity    Alcohol use: Yes     Comment: OCC    Drug use: Never   Other Topics Concern    Caffeine Concern Yes     Comment: 1 cup a day    Exercise No         REVIEW OF SYSTEMS:   GENERAL HEALTH: feels well otherwise  SKIN: denies any unusual skin lesions or rashes  RESPIRATORY: denies shortness of breath with exertion  CARDIOVASCULAR: denies chest pain on exertion  GI:  denies abdominal pain and denies heartburn  NEURO: denies headaches    EXAM:   /70   Pulse (!) 48   Resp 16   Ht 6' 2\" (1.88 m)   Wt 275 lb (124.7 kg)   SpO2 95%   BMI 35.31 kg/m²   GENERAL: well developed, well nourished,in no apparent distress  SKIN: no rashes,no suspicious lesions  HEENT: atraumatic, normocephalic,ears and throat are clear  NECK: supple,no adenopathy,no bruits  LUNGS: clear to auscultation  CARDIO: RRR without murmur  GI: good BS's,no masses, HSM or tenderness  EXTREMITIES: no cyanosis, clubbing or edema    ASSESSMENT AND PLAN:   Pt presents for a recheck of his hypertension, HLD and PAF. BP is well controlled, no significant medication side effects noted.  PAF is rate controlled  PLAN: will continue present medications, reviewed diet, exercise and weight control. The patient indicates understanding of these issues and agrees to the plan.  The patient is asked to return in 6 m.      History/Other:   Review of Systems  Current Outpatient Medications   Medication Sig Dispense Refill    gabapentin 300 MG Oral Cap Take 600mg in AM, 600mg at Noon, 900mg  capsule 1    Coenzyme Q10-Vitamin E (QUNOL ULTRA COQ10 OR) Take 100 mg by mouth daily.      hydrALAZINE 50 MG Oral Tab Take 1 tablet (50 mg total) by mouth every morning. Take with 100mg in AM      labetalol 200 MG Oral Tab Take 1 tablet (200 mg total) by mouth 2 (two) times daily.      hydrALAZINE 100 MG Oral Tab Take 1 tablet (100 mg total) by mouth 3 (three) times daily.      Ciclopirox (CICLODAN) 8 % External Solution Apply topically nightly.      torsemide 20 MG Oral Tab Take 1 tablet (20 mg total) by mouth in the morning and 1 tablet (20 mg total) before bedtime.      spironolactone 25 MG Oral Tab Take 1 tablet (25 mg total) by mouth daily.      Fluocinolone Acetonide 0.01 % Otic Oil INSTILL 5 DROPS INTO BOTH EARS TWICE DAILY AS NEEDED      atorvastatin 40 MG Oral Tab Take 1 tablet (40 mg total) by mouth nightly.      apixaban  5 MG Oral Tab Take by mouth 2 (two) times daily.       Allergies:  Allergies   Allergen Reactions    Amlodipine SWELLING    Penicillins SWELLING    Rivaroxaban HIVES and ITCHING     Hives on legs      Vancomycin HALLUCINATION, ITCHING, OTHER (SEE COMMENTS), RASH and UNKNOWN     Elevated temperature    Losartan FACE FLUSHING    Ace Inhibitors Coughing       Objective:   Physical Exam    Assessment & Plan:   1. Essential hypertension    2. Mixed hyperlipidemia    3. Paroxysmal atrial fibrillation (HCC)        No orders of the defined types were placed in this encounter.      Meds This Visit:  Requested Prescriptions      No prescriptions requested or ordered in this encounter       Imaging & Referrals:  None

## 2024-02-03 ENCOUNTER — PATIENT MESSAGE (OUTPATIENT)
Dept: NEUROLOGY | Facility: CLINIC | Age: 74
End: 2024-02-03

## 2024-02-03 DIAGNOSIS — R76.8 ELEVATED SERUM IMMUNOGLOBULIN FREE LIGHT CHAIN LEVEL: Primary | ICD-10-CM

## 2024-02-05 ENCOUNTER — OFFICE VISIT (OUTPATIENT)
Dept: FAMILY MEDICINE CLINIC | Facility: CLINIC | Age: 74
End: 2024-02-05
Payer: MEDICARE

## 2024-02-05 ENCOUNTER — HOSPITAL ENCOUNTER (EMERGENCY)
Age: 74
Discharge: HOME | End: 2024-02-05
Attending: EMERGENCY MEDICINE
Payer: MEDICARE

## 2024-02-05 VITALS
DIASTOLIC BLOOD PRESSURE: 62 MMHG | WEIGHT: 275 LBS | TEMPERATURE: 98 F | RESPIRATION RATE: 16 BRPM | OXYGEN SATURATION: 97 % | HEART RATE: 50 BPM | SYSTOLIC BLOOD PRESSURE: 118 MMHG | HEIGHT: 74 IN | BODY MASS INDEX: 35.29 KG/M2

## 2024-02-05 VITALS
OXYGEN SATURATION: 95 % | TEMPERATURE: 98 F | HEIGHT: 74 IN | HEART RATE: 51 BPM | SYSTOLIC BLOOD PRESSURE: 149 MMHG | WEIGHT: 275 LBS | BODY MASS INDEX: 35.29 KG/M2 | DIASTOLIC BLOOD PRESSURE: 92 MMHG | RESPIRATION RATE: 18 BRPM

## 2024-02-05 DIAGNOSIS — M70.22 OLECRANON BURSITIS OF LEFT ELBOW: Primary | ICD-10-CM

## 2024-02-05 DIAGNOSIS — R58 ECCHYMOSIS: Primary | ICD-10-CM

## 2024-02-05 PROCEDURE — 99282 EMERGENCY DEPT VISIT SF MDM: CPT

## 2024-02-05 PROCEDURE — 99283 EMERGENCY DEPT VISIT LOW MDM: CPT

## 2024-02-05 PROCEDURE — 99213 OFFICE O/P EST LOW 20 MIN: CPT | Performed by: NURSE PRACTITIONER

## 2024-02-05 NOTE — TELEPHONE ENCOUNTER
From: Jose A Llanos  To: Mode Cox  Sent: 2/3/2024 5:02 AM CST  Subject: RE: Follow up care    Dr. Cox  1-Last week I went up to the full 2,700 mgs of gabapentin.  2-This morning I still had a breakthrough. Pain maybe a 2 but enough to get me up so had to walk it off.  3-When will we start the infusion therapy previously mentioned.  4-Will it be IVIG or Rituximab.  5-Might acupuncture be something that would work instead of more gabapentin?  Thank you.  Fabricio Llanos

## 2024-02-05 NOTE — PROGRESS NOTES
Chief Complaint   Patient presents with    Pain     Left elbow bruise and swelling.  No know injuries.  Otc meds taken.  S/s since bedtime.       HPI:     Jose A Llanos is a 73 year old male who presents today with a chief complaint of  left elbow swelling and bruising.  Cause - denies any injury or trauma to left elbow that he recalls.  Patient is on blood thinners. Denies history of bursitis or gout.  Onset - 2 days ago  Location of pain - left olecranon bursa  Pain described as - no pain  Pain scale - 0/10  Radiation - none  Pain is aggravated by movement, use and palpation  Any prior injury to elbow - none that he recalls  Prior surgeries to elbow - none  Care prior to arrival consisted of nothing, with no relief.     Current Outpatient Medications   Medication Sig Dispense Refill    gabapentin 300 MG Oral Cap Take 600mg in AM, 600mg at Noon, 900mg  capsule 1    Coenzyme Q10-Vitamin E (QUNOL ULTRA COQ10 OR) Take 100 mg by mouth daily.      hydrALAZINE 50 MG Oral Tab Take 1 tablet (50 mg total) by mouth every morning. Take with 100mg in AM      labetalol 200 MG Oral Tab Take 1 tablet (200 mg total) by mouth 2 (two) times daily.      hydrALAZINE 100 MG Oral Tab Take 1 tablet (100 mg total) by mouth 3 (three) times daily.      Ciclopirox (CICLODAN) 8 % External Solution Apply topically nightly.      torsemide 20 MG Oral Tab Take 1 tablet (20 mg total) by mouth in the morning and 1 tablet (20 mg total) before bedtime.      spironolactone 25 MG Oral Tab Take 1 tablet (25 mg total) by mouth daily.      Fluocinolone Acetonide 0.01 % Otic Oil INSTILL 5 DROPS INTO BOTH EARS TWICE DAILY AS NEEDED      atorvastatin 40 MG Oral Tab Take 1 tablet (40 mg total) by mouth nightly.      apixaban 5 MG Oral Tab Take by mouth 2 (two) times daily.        Past Medical History:   Diagnosis Date    AAA (abdominal aortic aneurysm) (HCC)     Allergic rhinitis     Anesthesia complication     Wakes up violent    Arrhythmia      afib    Cataract     Complex sleep apnea syndrome 2016    Diverticulosis of large intestine     Essential hypertension     High blood pressure     Osteoarthritis     Sleep apnea     Umbilical hernia without obstruction or gangrene       Social History:  Social History     Socioeconomic History    Marital status:    Tobacco Use    Smoking status: Former     Packs/day: 1.00     Years: 20.00     Additional pack years: 0.00     Total pack years: 20.00     Types: Cigarettes     Quit date: 2001     Years since quittin.5     Passive exposure: Never    Smokeless tobacco: Never    Tobacco comments:     Quit 20+ years ago per pt   Vaping Use    Vaping Use: Never used   Substance and Sexual Activity    Alcohol use: Yes     Comment: OCC    Drug use: Never   Other Topics Concern    Caffeine Concern Yes     Comment: 1 cup a day    Exercise No        REVIEW OF SYSTEMS:   GENERAL HEALTH: feels well otherwise  SKIN: denies any unusual skin lesions or rashes  RESPIRATORY: denies shortness of breath with exertion  CARDIOVASCULAR: denies chest pain on exertion  GI: denies abdominal pain and denies heartburn  NEURO: denies headaches  Musculoskeletal: elbow pain as described above.         EXAM:   /62   Pulse 50   Temp 98.4 °F (36.9 °C) (Oral)   Resp 16   Ht 6' 2\" (1.88 m)   Wt 275 lb (124.7 kg)   SpO2 97%   BMI 35.31 kg/m²   GENERAL: well developed, well nourished,in no apparent distress  SKIN: no rashes,no suspicious lesions  HEENT: atraumatic, normocephalic,ears and throat are clear  NECK: supple,no adenopathy,no bruits  LUNGS: clear to auscultation  CARDIO: RRR without murmur  EXTREMITIES: Rest of the extremities -->  no cyanosis, clubbing or edema  Exam of left Elbow -->   - swelling: moderate   - deformity/defect: none  - crepitus: none  - ecchymosis/bruising: mild ecchymosis distal to elbow on left forearm, but not near olecranon area  - tenderness over medial epicondyle: none  - tenderness over  lateral epicondyle: none  - tenderness over olecranon process: none  - tenderness over distal humerus: none  - tenderness over proximal forearm/head of radius: none  - Extension : full extension  - Flexion: full flexion  - olecranon bursitis: moderate swelling noted.  No warmth, no erythema.          ASSESSMENT AND PLAN:       ACE applied, advised to take off at night  Rest, ice, heat, elevate  Tylenol for pain  Avoid heavy lifting, gripping, grasping  Recheck with pcp in 1 week if not better.  Go to ER for any new or worsening swelling.  The patient/parent indicates understanding of these issues and agrees to the plan.

## 2024-02-05 NOTE — PATIENT INSTRUCTIONS
Rest.  Don't lean on elbow.    Please wear ace wrap to the left elbow.  Off at night.  Tylenol for pain management.    Follow up with PCP in 3-5 days  If any new or worsening symptoms- Please go to the ER

## 2024-02-06 ENCOUNTER — TELEPHONE (OUTPATIENT)
Dept: NEUROLOGY | Facility: CLINIC | Age: 74
End: 2024-02-06

## 2024-02-06 DIAGNOSIS — G62.9 NEUROPATHY: Primary | ICD-10-CM

## 2024-02-06 DIAGNOSIS — G61.89 POLYNEUROPATHY ASSOCIATED WITH MONOCLONAL IGM ANTIBODIES TO MYELIN-ASSOCIATED GLYCOPROTEIN (HCC): ICD-10-CM

## 2024-02-06 NOTE — TELEPHONE ENCOUNTER
Prior authorization requested for 5 days of IVIG therapy then monthly.      Infuse IVIG per below dosing schedule at the [facility].   Dose:  0.4 g/kg  Special instructions:   Frequency: Daily for 5 days to initiate treatment than jason  CPT code 77011:  Infusion  J Code:

## 2024-02-06 NOTE — TELEPHONE ENCOUNTER
Spoke to patient who will make arrangements to do daily infusions of IVIG and let this office know what dates will work for him (M-F). Patient lives in [place], would be convenient to infuse at [facility].    See alternate TE on 2/6/2024 for Therapy Plan.

## 2024-02-06 NOTE — ED PROVIDER NOTES
Patient Seen in: [facility] [place]      History     Chief Complaint   Patient presents with    Swelling Edema     Stated Complaint: left elbow \"bulge\"    Subjective:   HPI    73-year-old male who is on a blood thinners present emerged part for left elbow bulge he noticed over the last 24 to 48 hours it is now spread without bruising down into the forearm prompting his visit here he denies trauma he denies paresthesias weakness pain or any other exacerbating relieving factors or associated symptoms    Objective:   Past Medical History:   Diagnosis Date    AAA (abdominal aortic aneurysm) (HCC)     Allergic rhinitis     Anesthesia complication     Wakes up violent    Arrhythmia     afib    Cataract     Complex sleep apnea syndrome 2016    Diverticulosis of large intestine     Essential hypertension     High blood pressure     Osteoarthritis     Sleep apnea     Umbilical hernia without obstruction or gangrene               Past Surgical History:   Procedure Laterality Date    ARTHROSCOPY OF JOINT UNLISTED Right     Meniscectomy    OTHER      Nasal surgery    OTHER      Hemorrhoidectomy and fistula repair    TONSILLECTOMY      TOTAL HIP REPLACEMENT Left                 Social History     Socioeconomic History    Marital status:    Tobacco Use    Smoking status: Former     Packs/day: 1.00     Years: 20.00     Additional pack years: 0.00     Total pack years: 20.00     Types: Cigarettes     Quit date: 2001     Years since quittin.5     Passive exposure: Never    Smokeless tobacco: Never    Tobacco comments:     Quit 20+ years ago per pt   Vaping Use    Vaping Use: Never used   Substance and Sexual Activity    Alcohol use: Yes     Comment: OCC    Drug use: Never   Other Topics Concern    Caffeine Concern Yes     Comment: 1 cup a day    Exercise No              Review of Systems    Positive for stated complaint: left elbow \"bulge\"  Other systems are as noted in HPI.  Constitutional  and vital signs reviewed.      All other systems reviewed and negative except as noted above.    Physical Exam     ED Triage Vitals [02/05/24 2058]   /67   Pulse 52   Resp 18   Temp 98.4 °F (36.9 °C)   Temp src Oral   SpO2 94 %   O2 Device None (Room air)       Current:BP (!) 149/92   Pulse 51   Temp 98.4 °F (36.9 °C) (Oral)   Resp 18   Ht 188 cm (6' 2\")   Wt 124.7 kg   SpO2 95%   BMI 35.31 kg/m²         Physical Exam  Awake alert patient appears no distress HEENT exam normal lungs clear cardiovascular exam regular rhythm abdomen soft nontender ecchymosis noted over the olecranon bursa region with bruising noted throughout the left forearm distal to the olecranon bursa area.  Full range of motion neurovascular tact of left upper extremity no erythema no breaks in the skin capillary refill is in 2 seconds       ED Course   Labs Reviewed - No data to display          Differential diagnosis includes cellulitis, fracture         MDM                                         Medical Decision Making  73-year-old male presenting to the emergency department for left elbow swelling.  There is no history of trauma there is no report of any fevers chills pressures to be more ecchymosis with no signs of infection patient was given symptomatic care instructions will be discharged home  The patient was screened and evaluated during this visit.  As a treating physician attending to the patient, I determined, within reasonable clinical confidence and prior to discharge, that an emergency medical condition was not or was no longer present.  There was no indication for further evaluation, treatment or admission on an emergency basis.    The usual and customary discharge instructions were discussed given the patient's ER course.  We discussed signs and symptoms that should prompt the patient's immediate return to the emergency department.  Reasonable over-the-counter and prescription treatment options and physician  follow-up plan was discussed.  Patient was discharged home in good condition this note was prepared using Dragon Medical voice recognition dictation software.  As a result errors may occur.  When identified to these areas have been corrected.  While every attempt is made to correct errors during dictation discrepancies may still exist.  Please contact if there are any errors      Problems Addressed:  Ecchymosis: acute illness or injury        Disposition and Plan     Clinical Impression:  1. Ecchymosis         Disposition:  Discharge  2/5/2024 11:50 pm    Follow-up:  No follow-up provider specified.        Medications Prescribed:  Current Discharge Medication List

## 2024-02-06 NOTE — ED INITIAL ASSESSMENT (HPI)
Patient here with bruising to left elbow. States he noticed it last night. More painful today. Denies trauma. CMS intact.

## 2024-02-07 ENCOUNTER — PATIENT MESSAGE (OUTPATIENT)
Dept: INTERNAL MEDICINE CLINIC | Facility: CLINIC | Age: 74
End: 2024-02-07

## 2024-02-07 PROBLEM — G61.89: Status: ACTIVE | Noted: 2024-02-07

## 2024-02-07 RX ORDER — DIPHENHYDRAMINE HCL 25 MG
25 CAPSULE ORAL ONCE
OUTPATIENT
Start: 2024-02-19

## 2024-02-07 RX ORDER — DIPHENHYDRAMINE HYDROCHLORIDE 50 MG/ML
25 INJECTION INTRAMUSCULAR; INTRAVENOUS ONCE
OUTPATIENT
Start: 2024-02-19

## 2024-02-07 RX ORDER — ACETAMINOPHEN 325 MG/1
650 TABLET ORAL ONCE
OUTPATIENT
Start: 2024-02-19

## 2024-02-07 NOTE — TELEPHONE ENCOUNTER
Therapy plan ordered for IVIG loading dose for 5 days starting Feb 19.  Message sent to [facility] Charge Nurse.  Patient made aware of orders placed and to anticipate a call from Cancer [facility].

## 2024-02-08 ENCOUNTER — LAB ENCOUNTER (OUTPATIENT)
Dept: LAB | Age: 74
End: 2024-02-08
Attending: Other
Payer: MEDICARE

## 2024-02-08 ENCOUNTER — OFFICE VISIT (OUTPATIENT)
Dept: INTERNAL MEDICINE CLINIC | Facility: CLINIC | Age: 74
End: 2024-02-08
Payer: MEDICARE

## 2024-02-08 ENCOUNTER — LAB ENCOUNTER (OUTPATIENT)
Dept: LAB | Age: 74
End: 2024-02-08
Payer: MEDICARE

## 2024-02-08 VITALS
RESPIRATION RATE: 16 BRPM | OXYGEN SATURATION: 96 % | SYSTOLIC BLOOD PRESSURE: 135 MMHG | DIASTOLIC BLOOD PRESSURE: 85 MMHG | HEIGHT: 74 IN | TEMPERATURE: 97 F | WEIGHT: 275 LBS | HEART RATE: 48 BPM | BODY MASS INDEX: 35.29 KG/M2

## 2024-02-08 DIAGNOSIS — R58 ECCHYMOSIS OF FOREARM: ICD-10-CM

## 2024-02-08 DIAGNOSIS — R76.8 ELEVATED SERUM IMMUNOGLOBULIN FREE LIGHT CHAIN LEVEL: ICD-10-CM

## 2024-02-08 DIAGNOSIS — R58 ECCHYMOSIS OF FOREARM: Primary | ICD-10-CM

## 2024-02-08 LAB
BASOPHILS # BLD AUTO: 0.04 X10(3) UL (ref 0–0.2)
BASOPHILS NFR BLD AUTO: 0.6 %
CRP SERPL-MCNC: <0.29 MG/DL (ref ?–0.3)
EOSINOPHIL # BLD AUTO: 0.12 X10(3) UL (ref 0–0.7)
EOSINOPHIL NFR BLD AUTO: 1.8 %
ERYTHROCYTE [DISTWIDTH] IN BLOOD BY AUTOMATED COUNT: 13.1 %
ERYTHROCYTE [SEDIMENTATION RATE] IN BLOOD: 16 MM/HR
HCT VFR BLD AUTO: 42.8 %
HGB BLD-MCNC: 14.4 G/DL
IGA SERPL-MCNC: 130 MG/DL (ref 70–312)
IMM GRANULOCYTES # BLD AUTO: 0.02 X10(3) UL (ref 0–1)
IMM GRANULOCYTES NFR BLD: 0.3 %
IMMUNOGLOBULIN PNL SER-MCNC: 880 MG/DL (ref 791–1643)
LYMPHOCYTES # BLD AUTO: 1.93 X10(3) UL (ref 1–4)
LYMPHOCYTES NFR BLD AUTO: 28.9 %
MCH RBC QN AUTO: 31 PG (ref 26–34)
MCHC RBC AUTO-ENTMCNC: 33.6 G/DL (ref 31–37)
MCV RBC AUTO: 92.2 FL
MONOCYTES # BLD AUTO: 0.92 X10(3) UL (ref 0.1–1)
MONOCYTES NFR BLD AUTO: 13.8 %
NEUTROPHILS # BLD AUTO: 3.64 X10 (3) UL (ref 1.5–7.7)
NEUTROPHILS # BLD AUTO: 3.64 X10(3) UL (ref 1.5–7.7)
NEUTROPHILS NFR BLD AUTO: 54.6 %
PLATELET # BLD AUTO: 172 10(3)UL (ref 150–450)
RBC # BLD AUTO: 4.64 X10(6)UL
WBC # BLD AUTO: 6.7 X10(3) UL (ref 4–11)

## 2024-02-08 PROCEDURE — 86140 C-REACTIVE PROTEIN: CPT

## 2024-02-08 PROCEDURE — 36415 COLL VENOUS BLD VENIPUNCTURE: CPT

## 2024-02-08 PROCEDURE — 99214 OFFICE O/P EST MOD 30 MIN: CPT

## 2024-02-08 PROCEDURE — 82784 ASSAY IGA/IGD/IGG/IGM EACH: CPT

## 2024-02-08 PROCEDURE — 85652 RBC SED RATE AUTOMATED: CPT

## 2024-02-08 PROCEDURE — 85025 COMPLETE CBC W/AUTO DIFF WBC: CPT

## 2024-02-08 NOTE — TELEPHONE ENCOUNTER
Seen in ER on 2/5. Worsening symptoms. Appt scheduled today with DEWEY Rowland at 11:45 for further evaluation.

## 2024-02-08 NOTE — TELEPHONE ENCOUNTER
From: Wil Llanos  To: Hayder John  Sent: 2/7/2024 7:21 PM CST  Subject: RE: Arm Bruising    Dr. John:  Any chance you or one of your excellent NP's could squeeze me in to see my arm. Since I saw the [facility] and ER for the bruising around my elbow, it is now working its way to my wrist.  I did not fall and don't recall bumping into anything.  I now know I should have tried to get into see you first instead.  If this thing kills me I'll never hear the end of it from my wife.  Thank you.  Fabricio Llanos

## 2024-02-08 NOTE — PROGRESS NOTES
Wil Llanos is a 73 year old male.  HPI:   HPI   Pt presents today with c/o left arm bruising.  Bruising of the left medial arm started two days ago. Pt denies trauma. Pt states he was leaning on his forearm/elbow doing computer work, felt a \"bump\" on the elbow and felt tender then the \"bump\" went away and was left with bruising. Pt went to ED, it got wrapped and discharged. Then yesterday pt saw new bruising appear on the right forearm. Pt is on Eliquis.  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  Current Outpatient Medications   Medication Sig Dispense Refill    gabapentin 300 MG Oral Cap Take 600mg in AM, 600mg at Noon, 900mg QHS (Patient taking differently: 3 pills three times a day, 2700 mg total) 630 capsule 1    Coenzyme Q10-Vitamin E (QUNOL ULTRA COQ10 OR) Take 100 mg by mouth daily.      hydrALAZINE 50 MG Oral Tab Take 1 tablet (50 mg total) by mouth every morning. Take with 100mg in AM      labetalol 200 MG Oral Tab Take 1 tablet (200 mg total) by mouth 2 (two) times daily.      hydrALAZINE 100 MG Oral Tab Take 1 tablet (100 mg total) by mouth 3 (three) times daily.      Ciclopirox (CICLODAN) 8 % External Solution Apply topically nightly.      torsemide 20 MG Oral Tab Take 1 tablet (20 mg total) by mouth in the morning and 1 tablet (20 mg total) before bedtime.      spironolactone 25 MG Oral Tab Take 1 tablet (25 mg total) by mouth daily.      Fluocinolone Acetonide 0.01 % Otic Oil INSTILL 5 DROPS INTO BOTH EARS TWICE DAILY AS NEEDED      atorvastatin 40 MG Oral Tab Take 1 tablet (40 mg total) by mouth nightly.      apixaban 5 MG Oral Tab Take by mouth 2 (two) times daily.        Past Medical History:   Diagnosis Date    AAA (abdominal aortic aneurysm) (HCC)     Allergic rhinitis     Anesthesia complication     Wakes up violent    Arrhythmia     afib    Cataract     Complex sleep apnea  syndrome 2016    Diverticulosis of large intestine     Essential hypertension     High blood pressure     Osteoarthritis     Sleep apnea     Umbilical hernia without obstruction or gangrene       Social History:  Social History     Socioeconomic History    Marital status:    Tobacco Use    Smoking status: Former     Packs/day: 1.00     Years: 20.00     Additional pack years: 0.00     Total pack years: 20.00     Types: Cigarettes     Quit date: 2001     Years since quittin.5     Passive exposure: Never    Smokeless tobacco: Never    Tobacco comments:     Quit 20+ years ago per pt   Vaping Use    Vaping Use: Never used   Substance and Sexual Activity    Alcohol use: Yes     Comment: OCC    Drug use: Never   Other Topics Concern    Caffeine Concern Yes     Comment: 1 cup a day    Exercise No        REVIEW OF SYSTEMS:   Review of Systems   Constitutional: Negative.    HENT: Negative.     Respiratory: Negative.     Cardiovascular: Negative.    Musculoskeletal: Negative.    Skin:  Positive for color change.   Neurological: Negative.    Psychiatric/Behavioral: Negative.           EXAM:   /85   Pulse (!) 48   Temp 97.2 °F (36.2 °C)   Resp 16   Ht 6' 2\" (1.88 m)   Wt 275 lb (124.7 kg)   SpO2 96%   BMI 35.31 kg/m²   Physical Exam  Vitals and nursing note reviewed.   Constitutional:       Appearance: Normal appearance. He is normal weight.   Cardiovascular:      Rate and Rhythm: Normal rate and regular rhythm.      Pulses: Normal pulses.      Heart sounds: Normal heart sounds.   Pulmonary:      Effort: Pulmonary effort is normal.      Breath sounds: Normal breath sounds.   Musculoskeletal:         General: No swelling or tenderness. Normal range of motion.   Skin:     Capillary Refill: Capillary refill takes less than 2 seconds.      Findings: Bruising and ecchymosis present. No erythema or lesion.          Neurological:      General: No focal deficit present.      Mental Status: He is alert and  oriented to person, place, and time. Mental status is at baseline.          ASSESSMENT AND PLAN:   Diagnoses and all orders for this visit:    Ecchymosis of forearm- unknown cause of bruising. No s/s of DVT, cellulitis.   -     CBC With Differential With Platelet; Future  -     Sed Rate, Westergren (Automated) [E]; Future  -     C-Reactive Protein [E]; Future   -pt will update Dr. Cox due to upcomming immuno therapy  Requested Prescriptions      No prescriptions requested or ordered in this encounter         The patient indicates understanding of these issues and agrees to the plan.  The patient is asked to return if symptoms persist or worsen.

## 2024-02-17 ENCOUNTER — HOSPITAL ENCOUNTER (EMERGENCY)
Age: 74
Discharge: HOME | End: 2024-02-17
Attending: EMERGENCY MEDICINE
Payer: MEDICARE

## 2024-02-17 ENCOUNTER — PATIENT MESSAGE (OUTPATIENT)
Dept: NEUROLOGY | Facility: CLINIC | Age: 74
End: 2024-02-17

## 2024-02-17 VITALS
WEIGHT: 275 LBS | TEMPERATURE: 98 F | HEART RATE: 67 BPM | OXYGEN SATURATION: 95 % | RESPIRATION RATE: 18 BRPM | HEIGHT: 74 IN | BODY MASS INDEX: 35.29 KG/M2 | SYSTOLIC BLOOD PRESSURE: 132 MMHG | DIASTOLIC BLOOD PRESSURE: 60 MMHG

## 2024-02-17 DIAGNOSIS — S00.522A BLISTER (NONTHERMAL) OF ORAL CAVITY, INITIAL ENCOUNTER: ICD-10-CM

## 2024-02-17 DIAGNOSIS — U07.1 COVID-19: Primary | ICD-10-CM

## 2024-02-17 LAB
POCT INFLUENZA A: NEGATIVE
POCT INFLUENZA B: NEGATIVE
SARS-COV-2 RNA RESP QL NAA+PROBE: DETECTED

## 2024-02-17 PROCEDURE — 87430 STREP A AG IA: CPT | Performed by: EMERGENCY MEDICINE

## 2024-02-17 PROCEDURE — 99283 EMERGENCY DEPT VISIT LOW MDM: CPT

## 2024-02-17 PROCEDURE — 87502 INFLUENZA DNA AMP PROBE: CPT | Performed by: EMERGENCY MEDICINE

## 2024-02-17 NOTE — ED INITIAL ASSESSMENT (HPI)
Pt, who's scheduled to start IVIG therapy on Monday, presents to ER with a fever and sore throat. He took Tylenol @ 5am.

## 2024-02-17 NOTE — ED PROVIDER NOTES
Patient Seen in: [facility] In [place]      History     Chief Complaint   Patient presents with    Fever    Sore Throat     Stated Complaint: Pt, who's scheduled to start IVIG therapy on Monday, presents to ER with a feve*    Subjective:   HPI    Patient is a 73-year-old male who presents with fevers and sore throat started today.  Patient says he had a blister on the inside of his mouth.  Says it ruptured with blood that went down his throat.  Status sore throat.  Had a fever at home.  No other specific symptoms.  Denies cough.  No headaches.  Says he has a bruise on his right side of his back.  He is on Eliquis.  No other abdominal pain.  No dysuria frequency.  No urgency.  No diarrhea.  Patient is scheduled to start IVIG therapy may or demyelinating polyneuropathy.  This is scheduled for Monday.  No other specific complaints.    Objective:   Past Medical History:   Diagnosis Date    AAA (abdominal aortic aneurysm) (HCC)     Allergic rhinitis     Anesthesia complication     Wakes up violent    Arrhythmia     afib    Cataract     Complex sleep apnea syndrome 2016    Diverticulosis of large intestine     Essential hypertension     High blood pressure     Osteoarthritis     Sleep apnea     Umbilical hernia without obstruction or gangrene               Past Surgical History:   Procedure Laterality Date    ARTHROSCOPY OF JOINT UNLISTED Right     Meniscectomy    OTHER      Nasal surgery    OTHER      Hemorrhoidectomy and fistula repair    TONSILLECTOMY      TOTAL HIP REPLACEMENT Left                 Social History     Socioeconomic History    Marital status:    Tobacco Use    Smoking status: Former     Packs/day: 1.00     Years: 20.00     Additional pack years: 0.00     Total pack years: 20.00     Types: Cigarettes     Quit date: 2001     Years since quittin.5     Passive exposure: Never    Smokeless tobacco: Never    Tobacco comments:     Quit 20+ years ago per pt   Vaping Use     Vaping Use: Never used   Substance and Sexual Activity    Alcohol use: Yes     Comment: OCC    Drug use: Never   Other Topics Concern    Caffeine Concern Yes     Comment: 1 cup a day    Exercise No              Review of Systems    Positive for stated complaint: Pt, who's scheduled to start IVIG therapy on Monday, presents to ER with a feve*  Other systems are as noted in HPI.  Constitutional and vital signs reviewed.      All other systems reviewed and negative except as noted above.    Physical Exam     ED Triage Vitals [02/17/24 0631]   /60   Pulse 67   Resp 18   Temp 97.8 °F (36.6 °C)   Temp src Oral   SpO2 95 %   O2 Device None (Room air)       Current:/60   Pulse 67   Temp 97.8 °F (36.6 °C) (Oral)   Resp 18   Ht 188 cm (6' 2\")   Wt 124.7 kg   SpO2 95%   BMI 35.31 kg/m²         Physical Exam    General: Patient is resting comfortably in no acute distress  HEENT: Normal cephalic atraumatic.  Nonicteric sclera.  Moist mucous membranes.  No meningismus.  No adenopathy.  The left buccal area there is a nickel sized blister with erythema, small amount exudate.  No abscess.  Mild erythema of the oropharynx.  Lungs: No tachypnea.  Lungs clear to auscultation bilaterally without rales/rhonchi.  Equal breath sounds bilaterally  Cardiac: No tachycardia.  No murmurs.  Regular rate and rhythm.  Abdomen: Soft and nontender throughout.  No rebound or guarding  Extremities: No clubbing/cyanosis/edema.  Skin: No rashes, no pallor  Neuro: Awake oriented ×3.  Nonfocal.  Good strength throughout    ED Course     Labs Reviewed   RAPID SARS-COV-2 BY PCR - Abnormal; Notable for the following components:       Result Value    Rapid SARS-CoV-2 by PCR Detected (*)     All other components within normal limits   POCT FLU TEST - Normal    Narrative:     This assay is a rapid molecular in vitro test utilizing nucleic acid amplification of influenza A and B viral RNA.   RAPID STREP A SCREEN (LC) - Normal    Narrative:      A confirmatory culture is recommended if clinically indicated.             COVID is positive.  Strep, flu negative.         MDM      Fever, oral lesion.  COVID back in December.  Will recheck COVID, flu, strep.  Several blisters on his left buccal region that he says draining blood.  Says he thinks it was from eating spicy food or may have bitten it.  Would likely delay IVIG until symptoms are resolved.      COVID came back positive.  Patient says he had COVID in early December 2 and 1/2 to 3 months ago.  Did not have Paxlovid and then had a reoccurrence a week later.  Discussed options including Paxil with patient now.  Given his medication list including atorvastatin and Eliquis he would need to stop atorvastatin and just Eliquis.  Patient does not want to do this.  At this point he declines Paxlovid.  He will return if worsening symptoms or new complaints.  He is fully vaccinated and boosted.                             Medical Decision Making      Disposition and Plan     Clinical Impression:  1. COVID-19         Disposition:  Discharge  2/17/2024  7:01 am    Follow-up:  Hayder John MD  [address] [place] [id number]  [phone]    Follow up in 1 week(s)            Medications Prescribed:  Discharge Medication List as of 2/17/2024  7:12 AM

## 2024-02-19 ENCOUNTER — APPOINTMENT (OUTPATIENT)
Dept: HEMATOLOGY/ONCOLOGY | Age: 74
End: 2024-02-19
Payer: MEDICARE

## 2024-02-19 NOTE — TELEPHONE ENCOUNTER
Per covid protocol, Friday 2/23 would be Day 6, which is why Cancer Center told patient he could come in that day.  But this is for his initial loading dose of IVIG, which has been split into 5 consecutive treatment days, so patient would have a two day break over the weekend were he to start his treatment on Friday.  Will confirm with provider that patient should wait until Monday 2/26 to start IVIG infusions, so he can get all 5 days of infusions consecutively.

## 2024-02-19 NOTE — TELEPHONE ENCOUNTER
From: Wil Llanos  To: Mode Cox  Sent: 2/17/2024 8:19 AM CST  Subject: RE: Infusion for the week of the 19th    Dr. Cox  This morning I tested positive for Covid.  I cancelled the infusions I was set for online and with a call to the cancer center.  Not sure when we can reschedule.  Thanks.  Fabricio Llanos

## 2024-02-20 ENCOUNTER — APPOINTMENT (OUTPATIENT)
Dept: HEMATOLOGY/ONCOLOGY | Age: 74
End: 2024-02-20
Attending: Other
Payer: MEDICARE

## 2024-02-20 ENCOUNTER — PATIENT OUTREACH (OUTPATIENT)
Dept: CASE MANAGEMENT | Age: 74
End: 2024-02-20

## 2024-02-21 ENCOUNTER — APPOINTMENT (OUTPATIENT)
Dept: HEMATOLOGY/ONCOLOGY | Age: 74
End: 2024-02-21
Attending: Other
Payer: MEDICARE

## 2024-02-22 ENCOUNTER — APPOINTMENT (OUTPATIENT)
Dept: HEMATOLOGY/ONCOLOGY | Age: 74
End: 2024-02-22
Attending: Other
Payer: MEDICARE

## 2024-02-23 ENCOUNTER — APPOINTMENT (OUTPATIENT)
Dept: HEMATOLOGY/ONCOLOGY | Age: 74
End: 2024-02-23
Attending: Other
Payer: MEDICARE

## 2024-02-26 ENCOUNTER — OFFICE VISIT (OUTPATIENT)
Dept: HEMATOLOGY/ONCOLOGY | Age: 74
End: 2024-02-26
Attending: Other
Payer: MEDICARE

## 2024-02-26 VITALS
RESPIRATION RATE: 18 BRPM | WEIGHT: 276.5 LBS | TEMPERATURE: 98 F | DIASTOLIC BLOOD PRESSURE: 67 MMHG | OXYGEN SATURATION: 94 % | HEART RATE: 51 BPM | BODY MASS INDEX: 36 KG/M2 | SYSTOLIC BLOOD PRESSURE: 132 MMHG

## 2024-02-26 DIAGNOSIS — G61.89 POLYNEUROPATHY ASSOCIATED WITH MONOCLONAL IGM ANTIBODIES TO MYELIN-ASSOCIATED GLYCOPROTEIN (HCC): Primary | ICD-10-CM

## 2024-02-26 PROCEDURE — 96366 THER/PROPH/DIAG IV INF ADDON: CPT

## 2024-02-26 PROCEDURE — 96365 THER/PROPH/DIAG IV INF INIT: CPT

## 2024-02-26 RX ORDER — DIPHENHYDRAMINE HCL 25 MG
25 CAPSULE ORAL ONCE
Status: COMPLETED | OUTPATIENT
Start: 2024-02-26 | End: 2024-02-26

## 2024-02-26 RX ORDER — DIPHENHYDRAMINE HYDROCHLORIDE 50 MG/ML
25 INJECTION INTRAMUSCULAR; INTRAVENOUS ONCE
OUTPATIENT
Start: 2024-02-27

## 2024-02-26 RX ORDER — ACETAMINOPHEN 325 MG/1
650 TABLET ORAL ONCE
Status: COMPLETED | OUTPATIENT
Start: 2024-02-26 | End: 2024-02-26

## 2024-02-26 RX ORDER — DIPHENHYDRAMINE HCL 25 MG
25 CAPSULE ORAL ONCE
OUTPATIENT
Start: 2024-02-27

## 2024-02-26 RX ORDER — ACETAMINOPHEN 325 MG/1
650 TABLET ORAL ONCE
OUTPATIENT
Start: 2024-02-27

## 2024-02-26 RX ADMIN — DIPHENHYDRAMINE HCL 25 MG: 25 MG CAPSULE ORAL at 09:30:00

## 2024-02-26 RX ADMIN — ACETAMINOPHEN 650 MG: 325 TABLET ORAL at 09:30:00

## 2024-02-26 NOTE — PROGRESS NOTES
Education Record    Learner:  Patient    Disease / Diagnosis: IVIG    Barriers / Limitations:  None    Method:  Brief focused, printed material and  reinforcement    General Topics:  Plan of care reviewed    Outcome:  patient ambulatory. Arrived with wife. Tolerated infusion. AVS reviewed. Shows understanding. Discharged in stable condition.

## 2024-02-27 ENCOUNTER — OFFICE VISIT (OUTPATIENT)
Dept: HEMATOLOGY/ONCOLOGY | Age: 74
End: 2024-02-27
Attending: Other
Payer: MEDICARE

## 2024-02-27 VITALS
HEART RATE: 47 BPM | SYSTOLIC BLOOD PRESSURE: 169 MMHG | DIASTOLIC BLOOD PRESSURE: 77 MMHG | OXYGEN SATURATION: 93 % | RESPIRATION RATE: 18 BRPM | BODY MASS INDEX: 35 KG/M2 | WEIGHT: 274 LBS | TEMPERATURE: 98 F

## 2024-02-27 DIAGNOSIS — G61.89 POLYNEUROPATHY ASSOCIATED WITH MONOCLONAL IGM ANTIBODIES TO MYELIN-ASSOCIATED GLYCOPROTEIN (HCC): Primary | ICD-10-CM

## 2024-02-27 PROCEDURE — 96365 THER/PROPH/DIAG IV INF INIT: CPT

## 2024-02-27 PROCEDURE — 96366 THER/PROPH/DIAG IV INF ADDON: CPT

## 2024-02-27 RX ORDER — DIPHENHYDRAMINE HCL 25 MG
25 CAPSULE ORAL ONCE
Status: COMPLETED | OUTPATIENT
Start: 2024-02-27 | End: 2024-02-27

## 2024-02-27 RX ORDER — DIPHENHYDRAMINE HCL 25 MG
25 CAPSULE ORAL ONCE
Status: CANCELLED | OUTPATIENT
Start: 2024-02-28

## 2024-02-27 RX ORDER — ACETAMINOPHEN 325 MG/1
650 TABLET ORAL ONCE
Status: CANCELLED | OUTPATIENT
Start: 2024-02-28

## 2024-02-27 RX ORDER — DIPHENHYDRAMINE HYDROCHLORIDE 50 MG/ML
25 INJECTION INTRAMUSCULAR; INTRAVENOUS ONCE
Status: CANCELLED | OUTPATIENT
Start: 2024-02-28

## 2024-02-27 RX ORDER — ACETAMINOPHEN 325 MG/1
650 TABLET ORAL ONCE
Status: COMPLETED | OUTPATIENT
Start: 2024-02-27 | End: 2024-02-27

## 2024-02-27 RX ADMIN — DIPHENHYDRAMINE HCL 25 MG: 25 MG CAPSULE ORAL at 09:44:00

## 2024-02-27 RX ADMIN — ACETAMINOPHEN 650 MG: 325 TABLET ORAL at 09:44:00

## 2024-02-27 NOTE — PROGRESS NOTES
Education Record    Learner:  Patient    Disease / Diagnosis: IVIG #2.     Barriers / Limitations:  None   Comments:    Method:  Discussion   Comments:    General Topics:  Medication, Side effects and symptom management, and Plan of care reviewed   Comments:    Outcome:  Shows understanding   Comments:    IVIG infused without incident. Patient discharged in stable condition.

## 2024-02-28 ENCOUNTER — OFFICE VISIT (OUTPATIENT)
Dept: HEMATOLOGY/ONCOLOGY | Age: 74
End: 2024-02-28
Attending: Other
Payer: MEDICARE

## 2024-02-28 VITALS
HEIGHT: 74.02 IN | HEART RATE: 51 BPM | OXYGEN SATURATION: 94 % | SYSTOLIC BLOOD PRESSURE: 133 MMHG | RESPIRATION RATE: 18 BRPM | TEMPERATURE: 98 F | DIASTOLIC BLOOD PRESSURE: 55 MMHG | BODY MASS INDEX: 35.36 KG/M2 | WEIGHT: 275.5 LBS

## 2024-02-28 DIAGNOSIS — G61.89 POLYNEUROPATHY ASSOCIATED WITH MONOCLONAL IGM ANTIBODIES TO MYELIN-ASSOCIATED GLYCOPROTEIN (HCC): Primary | ICD-10-CM

## 2024-02-28 PROCEDURE — 96365 THER/PROPH/DIAG IV INF INIT: CPT

## 2024-02-28 PROCEDURE — 96366 THER/PROPH/DIAG IV INF ADDON: CPT

## 2024-02-28 RX ORDER — DIPHENHYDRAMINE HCL 25 MG
25 CAPSULE ORAL ONCE
Status: CANCELLED | OUTPATIENT
Start: 2024-02-29

## 2024-02-28 RX ORDER — ACETAMINOPHEN 325 MG/1
650 TABLET ORAL ONCE
Status: CANCELLED | OUTPATIENT
Start: 2024-02-29

## 2024-02-28 RX ORDER — DIPHENHYDRAMINE HYDROCHLORIDE 50 MG/ML
25 INJECTION INTRAMUSCULAR; INTRAVENOUS ONCE
Status: CANCELLED | OUTPATIENT
Start: 2024-02-29

## 2024-02-28 RX ORDER — ACETAMINOPHEN 325 MG/1
650 TABLET ORAL ONCE
Status: COMPLETED | OUTPATIENT
Start: 2024-02-28 | End: 2024-02-28

## 2024-02-28 RX ORDER — DIPHENHYDRAMINE HCL 25 MG
25 CAPSULE ORAL ONCE
Status: COMPLETED | OUTPATIENT
Start: 2024-02-28 | End: 2024-02-28

## 2024-02-28 RX ADMIN — ACETAMINOPHEN 650 MG: 325 TABLET ORAL at 09:40:00

## 2024-02-28 RX ADMIN — DIPHENHYDRAMINE HCL 25 MG: 25 MG CAPSULE ORAL at 09:40:00

## 2024-02-28 NOTE — PROGRESS NOTES
Pt here for IVIG . Pt states he woke up with a headache but didn't take anything since he gets tylenol as a premed. Some relief post premeds.     Ordering MD: Kenny Machado Exp: 2 of 5 remain     Pt tolerated infusion without difficulty or complaint. Reviewed next apt date/time: yes, 2/29, 3/1      Education Record  Learner:  Patient  Disease / Diagnosis: polyneuropathy  Barriers / Limitations:  None  Method:  Brief focused and Discussion  General Topics:  Medication, Side effects and symptom management, and Plan of care reviewed  Outcome:  Shows understanding    About 3/4 way through IVIG, pt complained of nausea. Water and saltine crackers offered and given. Offered to call MD for antiemetic. Pt declined stating crackers helped. Pt states he will take more tylenol at home for headache. Instructed to call his MD if it worsens and not able to relieve. Verbalized understanding. Discharged home in stable condition, no new complaints.

## 2024-02-29 ENCOUNTER — OFFICE VISIT (OUTPATIENT)
Dept: HEMATOLOGY/ONCOLOGY | Age: 74
End: 2024-02-29
Attending: Other
Payer: MEDICARE

## 2024-02-29 VITALS
TEMPERATURE: 98 F | RESPIRATION RATE: 18 BRPM | WEIGHT: 277.5 LBS | BODY MASS INDEX: 36 KG/M2 | DIASTOLIC BLOOD PRESSURE: 69 MMHG | OXYGEN SATURATION: 95 % | HEART RATE: 49 BPM | SYSTOLIC BLOOD PRESSURE: 136 MMHG

## 2024-02-29 DIAGNOSIS — G61.89 POLYNEUROPATHY ASSOCIATED WITH MONOCLONAL IGM ANTIBODIES TO MYELIN-ASSOCIATED GLYCOPROTEIN (HCC): Primary | ICD-10-CM

## 2024-02-29 PROCEDURE — 96366 THER/PROPH/DIAG IV INF ADDON: CPT

## 2024-02-29 PROCEDURE — 96365 THER/PROPH/DIAG IV INF INIT: CPT

## 2024-02-29 RX ORDER — DIPHENHYDRAMINE HCL 25 MG
25 CAPSULE ORAL ONCE
Status: CANCELLED | OUTPATIENT
Start: 2024-03-01

## 2024-02-29 RX ORDER — DIPHENHYDRAMINE HYDROCHLORIDE 50 MG/ML
25 INJECTION INTRAMUSCULAR; INTRAVENOUS ONCE
OUTPATIENT
Start: 2024-03-01

## 2024-02-29 RX ORDER — DIPHENHYDRAMINE HCL 25 MG
25 CAPSULE ORAL ONCE
Status: COMPLETED | OUTPATIENT
Start: 2024-02-29 | End: 2024-02-29

## 2024-02-29 RX ORDER — ACETAMINOPHEN 325 MG/1
650 TABLET ORAL ONCE
Status: CANCELLED | OUTPATIENT
Start: 2024-03-01

## 2024-02-29 RX ORDER — ACETAMINOPHEN 325 MG/1
650 TABLET ORAL ONCE
Status: COMPLETED | OUTPATIENT
Start: 2024-02-29 | End: 2024-02-29

## 2024-02-29 RX ADMIN — DIPHENHYDRAMINE HCL 25 MG: 25 MG CAPSULE ORAL at 10:07:00

## 2024-02-29 RX ADMIN — ACETAMINOPHEN 650 MG: 325 TABLET ORAL at 10:07:00

## 2024-02-29 NOTE — PROGRESS NOTES
Education Record    Learner:  Patient    Disease / Diagnosis: patient  here for IVIG infusion     Barriers / Limitations:  None    Method:  Brief focused, printed material and  reinforcement    General Topics:  Plan of care reviewed    Outcome:  Shows understanding   Patient  c/o headache this morning. Otherwise feeling ok, tolerated infusion without difficulty. Dc'd home in stable condition.

## 2024-03-01 ENCOUNTER — OFFICE VISIT (OUTPATIENT)
Dept: HEMATOLOGY/ONCOLOGY | Age: 74
End: 2024-03-01
Attending: Other
Payer: MEDICARE

## 2024-03-01 VITALS
SYSTOLIC BLOOD PRESSURE: 120 MMHG | RESPIRATION RATE: 16 BRPM | TEMPERATURE: 98 F | DIASTOLIC BLOOD PRESSURE: 68 MMHG | HEART RATE: 50 BPM | OXYGEN SATURATION: 95 %

## 2024-03-01 DIAGNOSIS — G61.89 POLYNEUROPATHY ASSOCIATED WITH MONOCLONAL IGM ANTIBODIES TO MYELIN-ASSOCIATED GLYCOPROTEIN (HCC): Primary | ICD-10-CM

## 2024-03-01 PROCEDURE — 96366 THER/PROPH/DIAG IV INF ADDON: CPT

## 2024-03-01 PROCEDURE — 96365 THER/PROPH/DIAG IV INF INIT: CPT

## 2024-03-01 RX ORDER — DIPHENHYDRAMINE HCL 25 MG
25 CAPSULE ORAL ONCE
Status: COMPLETED | OUTPATIENT
Start: 2024-03-01 | End: 2024-03-01

## 2024-03-01 RX ORDER — ACETAMINOPHEN 325 MG/1
650 TABLET ORAL ONCE
Status: COMPLETED | OUTPATIENT
Start: 2024-03-01 | End: 2024-03-01

## 2024-03-01 RX ORDER — ACETAMINOPHEN 325 MG/1
650 TABLET ORAL ONCE
Status: CANCELLED | OUTPATIENT
Start: 2024-03-01

## 2024-03-01 RX ORDER — DIPHENHYDRAMINE HCL 25 MG
25 CAPSULE ORAL ONCE
Status: CANCELLED | OUTPATIENT
Start: 2024-03-01

## 2024-03-01 RX ORDER — DIPHENHYDRAMINE HYDROCHLORIDE 50 MG/ML
25 INJECTION INTRAMUSCULAR; INTRAVENOUS ONCE
OUTPATIENT
Start: 2024-03-01

## 2024-03-01 RX ADMIN — DIPHENHYDRAMINE HCL 25 MG: 25 MG CAPSULE ORAL at 10:14:00

## 2024-03-01 RX ADMIN — ACETAMINOPHEN 650 MG: 325 TABLET ORAL at 10:14:00

## 2024-03-05 ENCOUNTER — HOSPITAL ENCOUNTER (EMERGENCY)
Age: 74
Discharge: HOME | End: 2024-03-05
Attending: EMERGENCY MEDICINE
Payer: MEDICARE

## 2024-03-05 ENCOUNTER — APPOINTMENT (OUTPATIENT)
Dept: CT IMAGING | Age: 74
End: 2024-03-05
Attending: EMERGENCY MEDICINE
Payer: MEDICARE

## 2024-03-05 VITALS
TEMPERATURE: 98 F | BODY MASS INDEX: 35.29 KG/M2 | WEIGHT: 275 LBS | DIASTOLIC BLOOD PRESSURE: 70 MMHG | OXYGEN SATURATION: 96 % | SYSTOLIC BLOOD PRESSURE: 166 MMHG | HEIGHT: 74 IN | RESPIRATION RATE: 19 BRPM | HEART RATE: 51 BPM

## 2024-03-05 DIAGNOSIS — R51.9 ACUTE NONINTRACTABLE HEADACHE, UNSPECIFIED HEADACHE TYPE: Primary | ICD-10-CM

## 2024-03-05 PROCEDURE — 99284 EMERGENCY DEPT VISIT MOD MDM: CPT

## 2024-03-05 PROCEDURE — 70450 CT HEAD/BRAIN W/O DYE: CPT | Performed by: EMERGENCY MEDICINE

## 2024-03-05 PROCEDURE — 96374 THER/PROPH/DIAG INJ IV PUSH: CPT

## 2024-03-05 RX ORDER — KETOROLAC TROMETHAMINE 30 MG/ML
30 INJECTION, SOLUTION INTRAMUSCULAR; INTRAVENOUS ONCE
Status: COMPLETED | OUTPATIENT
Start: 2024-03-05 | End: 2024-03-05

## 2024-03-05 NOTE — ED INITIAL ASSESSMENT (HPI)
Headache on Friday - and tingling to left side of face  - covid 2/17 - infusion last week \"all week\"

## 2024-03-05 NOTE — ED PROVIDER NOTES
Patient Seen in: [facility] In [place]      History     Chief Complaint   Patient presents with    Headache     Stated Complaint: Headache since Friday. slight tingling left side of face.    Subjective:   HPI    74-year-old male presents with headache.  He describes pain at the crown of his head and frontal region.  He reports intermittent episodes of tingling sensation to the left side of the face across the forehead and cheek region.  Denies any vision changes, dizziness.  No confusion or disorientation.  No weakness, numbness, tingling in the extremities.  He said this was originally occurring all last week with his IVIG infusions which she was told is an expected symptom during infusion.  However, the last infusion was 4 days ago and he continues to have headache.  He currently reports 4 out of 10 pain.  He has been trying Tylenol at home with no relief.    Objective:   Past Medical History:   Diagnosis Date    AAA (abdominal aortic aneurysm) (HCC)     Allergic rhinitis     Anesthesia complication     Wakes up violent    Arrhythmia     afib    Cataract     Complex sleep apnea syndrome 2016    Diverticulosis of large intestine     Essential hypertension     High blood pressure     Osteoarthritis     Polyneuropathy     Sleep apnea     Umbilical hernia without obstruction or gangrene               Past Surgical History:   Procedure Laterality Date    ARTHROSCOPY OF JOINT UNLISTED Right     Meniscectomy    OTHER      Nasal surgery    OTHER      Hemorrhoidectomy and fistula repair    TONSILLECTOMY      TOTAL HIP REPLACEMENT Left                 Social History     Socioeconomic History    Marital status:    Tobacco Use    Smoking status: Former     Packs/day: 1.00     Years: 20.00     Additional pack years: 0.00     Total pack years: 20.00     Types: Cigarettes     Quit date: 2001     Years since quittin.6     Passive exposure: Never    Smokeless tobacco: Never    Tobacco  comments:     Quit 20+ years ago per pt   Vaping Use    Vaping Use: Never used   Substance and Sexual Activity    Alcohol use: Yes     Comment: OCC    Drug use: Never   Other Topics Concern    Caffeine Concern Yes     Comment: 1 cup a day    Exercise No              Review of Systems    Positive for stated complaint: Headache since Friday. slight tingling left side of face.  Other systems are as noted in HPI.  Constitutional and vital signs reviewed.      All other systems reviewed and negative except as noted above.    Physical Exam     ED Triage Vitals [03/05/24 1502]   /72   Pulse 53   Resp 16   Temp 98.1 °F (36.7 °C)   Temp src Temporal   SpO2 95 %   O2 Device None (Room air)       Current:BP (!) 166/70   Pulse 51   Temp 98.1 °F (36.7 °C) (Temporal)   Resp 19   Ht 188 cm (6' 2\")   Wt 124.7 kg   SpO2 96%   BMI 35.31 kg/m²         Physical Exam    General:  Vitals as listed.  Appears mildly uncomfortable  HEENT: Sclerae anicteric.  Conjunctivae show no pallor.  Oropharynx clear, mucous membranes moist.  Bilateral TMs are pearly gray and without erythema or effusion.  No posterior pharyngeal erythema or exudate.  No tenderness on palpation over the sinuses.  Neck: supple, no rigidity.  Full range of motion without discomfort.  No cervical lymphadenopathy.  Lungs: good air exchange and clear   Heart: regular rate rhythm and no murmur   Abdomen: Soft and nontender.  No abdominal masses.  No peritoneal signs   Extremities: no edema, normal peripheral pulses   Neuro: Alert oriented and nonfocal.  Cranial nerves II through XII intact.  Strength and sensation intact throughout.  Skin: no rashes or nodules    ED Course     Labs Reviewed   RAINBOW DRAW LAVENDER   RAINBOW DRAW LIGHT GREEN             CT BRAIN OR HEAD (23971)    Result Date: 3/5/2024  PROCEDURE:  CT BRAIN OR HEAD (76804)  COMPARISON:  None.  INDICATIONS:  Headache since Friday. slight tingling left side of face.  TECHNIQUE:  Noncontrast CT  scanning is performed through the brain. Dose reduction techniques were used. Dose information is transmitted to the ACR (American College of Radiology) NRDR (National Radiology Data Registry) which includes the Dose Index Registry.  PATIENT STATED HISTORY: (As transcribed by Technologist)  Headache on Friday - and tingling to left side of face    FINDINGS:  Ventricles and sulci are normal caliber.  Expected gray-white matter differentiation.  No extra-axial fluid collection.  No mass effect or midline shift.  No acute intracranial hemorrhage.  The sella is not enlarged.  No Chiari 1 malformation.   Moderate mucosal thickening in maxillary sinuses.  Scattered mild mucosal thickening in the remainder of the paranasal sinuses.  Mastoid air cells are satisfactorily aerated.             CONCLUSION:  No acute intracranial abnormality.    LOCATION:  [facility]   Dictated by (CST): Chance Whitten MD on 3/05/2024 at 4:29 PM     Finalized by (CST): Chance Whitten MD on 3/05/2024 at 4:32 PM               MDM      74-year-old male presents with persistent headache.  Finished IVIG 4 days ago and continues to have headaches.  Says his neurologist told him headaches are typical during IVIG infusions but should not be lingering.  He reports intermittent tingling to the right side of the face but denies any at this time.  No neurologic deficits on examination currently.    Additional history obtained by neurology clinic documentation reports that the patient spoke with the office 3 days ago and they told him they did not feel that the persistent headache was likely related to the IVIG infusion and that he should be evaluated for other possible causes.    Differential includes but is not limited to sinusitis, medication reaction, tension headache, intracranial hemorrhage, a life threat.    I discussed this case with Dr. Cotton who is the hospital neurologist and he agrees with plan for CT brain given 4 days of intermittent tingling to the  right side of the face and then recommends treatment with IV Toradol.    CT brain ordered for further evaluation.    My independent interpretation of CT of the brain is that there is no acute intracranial hemorrhage.    Patient currently reports his headache is down to a 2 out of 10 after Toradol 30 mg IV.  He is comfortable going home.  Dr. Cotton recommends that the patient call tomorrow and follow-up with Dr. Cox.  The patient is comfortable with this plan and will reach out to his specialist tomorrow.                                       Medical Decision Making      Disposition and Plan     Clinical Impression:  1. Acute nonintractable headache, unspecified headache type         Disposition:  Discharge  3/5/2024  5:42 pm    Follow-up:  Mode Cox MD  [address] [place] [address]  [phone]    Call            Medications Prescribed:  Current Discharge Medication List

## 2024-03-12 ENCOUNTER — PATIENT OUTREACH (OUTPATIENT)
Dept: CASE MANAGEMENT | Age: 74
End: 2024-03-12

## 2024-03-12 NOTE — PROGRESS NOTES
1st attempt ER f/up apt request  PCP -existing apt (7/26), pt doesn't want sooner apt  NEURO -existing apt (4/12)  Closing encounter

## 2024-03-19 DIAGNOSIS — G62.9 NEUROPATHY: ICD-10-CM

## 2024-03-19 RX ORDER — GABAPENTIN 300 MG/1
CAPSULE ORAL
Qty: 810 CAPSULE | Refills: 1 | Status: SHIPPED | OUTPATIENT
Start: 2024-03-19

## 2024-03-19 RX ORDER — GABAPENTIN 300 MG/1
CAPSULE ORAL
Qty: 630 CAPSULE | Refills: 1 | Status: CANCELLED | OUTPATIENT
Start: 2024-03-19

## 2024-03-19 NOTE — TELEPHONE ENCOUNTER
Medication: gabapentin 300 MG      Date of last refill: /08/2024 (#630/0R)  [place]     Last office visit: 12/12/23  Due back to clinic per last office note:  4 mon  Date next office visit scheduled:           Future Appointments   Date Time Provider Department Center   1/26/2024 10:30 AM Hayder John MD [facility] [address] [facility]   4/12/2024 10:40 AM Mode Cox MD [facility]            Last OV note recommendation:     Impression/ Plan:  Wil Llanos is a 73 year old male who originally presented 8/24/2023 for evaluation of multiple complaints.  He started to have cramping in both legs in the past 6 months.  He also had numbness in toes 3 yrs ago.  He states he intermittently is having \"locking up\" of hands with thumb moving inward on R or L side - states this has been in the past few weeks.       Neurologic exam shows decreased sensation in both feet with depressed DTRs distally as well as reduced sensation in UE to vibration. In addition, there is some weakness in APB muscles in UE bilaterally. Signs/symptoms are overall concerning for neuropathy.  In addition, NCS/EMG done of both legs showed some asymmetry with absent sural sensory response on left side and present but reduced on R side. Tibial motor response showed bilateral temporal dispersion proximally and this could suggest an atypical neuropathy or be due to artifact.       In order to further evaluate, NCS/EMG of both upper extremities was done and showed evidence for sensory neuropathy with axonal and demyelinating features along with median entrapment neuropathy across the right wrist, suggestive of carpal tunnel syndrome, which is mild.  Labs were done and showed mild elevation in kappa free light chain but normal ratio and borderline B12 level with normal ESR, CRP, RF.  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  Patient was advised of potential side effects, including but not limited to rash, excessive sedation, weight gain and/or peripheral edema; patient was advised to notify office with any new or worsening symptoms.     1. Frequent falls  As noted above   - Oncology/Hematology Referral - In Network     2. Weakness of both hands  As noted above   - Oncology/Hematology Referral - In Network     3. Neuropathy  As noted above  - Oncology/Hematology Referral - In Network  - gabapentin 300 MG Oral Cap; Take 3 capsules (900 mg total) by mouth in the morning, at noon, and at bedtime.  Dispense: 810 capsule; Refill: 0     4. 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 Elevated serum immunoglobulin free light chain level  As noted above   - Oncology/Hematology Referral - In Network

## 2024-03-22 ENCOUNTER — MED REC SCAN ONLY (OUTPATIENT)
Dept: FAMILY MEDICINE CLINIC | Facility: CLINIC | Age: 74
End: 2024-03-22

## 2024-04-12 ENCOUNTER — TELEPHONE (OUTPATIENT)
Dept: NEUROLOGY | Facility: CLINIC | Age: 74
End: 2024-04-12

## 2024-04-12 ENCOUNTER — OFFICE VISIT (OUTPATIENT)
Dept: NEUROLOGY | Facility: CLINIC | Age: 74
End: 2024-04-12
Payer: MEDICARE

## 2024-04-12 VITALS
BODY MASS INDEX: 35.29 KG/M2 | SYSTOLIC BLOOD PRESSURE: 160 MMHG | DIASTOLIC BLOOD PRESSURE: 60 MMHG | RESPIRATION RATE: 18 BRPM | HEART RATE: 50 BPM | WEIGHT: 275 LBS | OXYGEN SATURATION: 98 % | HEIGHT: 74 IN

## 2024-04-12 DIAGNOSIS — G61.89 POLYNEUROPATHY ASSOCIATED WITH MONOCLONAL IGM ANTIBODIES TO MYELIN-ASSOCIATED GLYCOPROTEIN (HCC): Primary | ICD-10-CM

## 2024-04-12 DIAGNOSIS — G62.9 NEUROPATHY: ICD-10-CM

## 2024-04-12 DIAGNOSIS — R29.898 WEAKNESS OF BOTH HANDS: ICD-10-CM

## 2024-04-12 PROCEDURE — 99214 OFFICE O/P EST MOD 30 MIN: CPT | Performed by: OTHER

## 2024-04-12 RX ORDER — ACETAMINOPHEN 325 MG/1
650 TABLET ORAL ONCE
Status: CANCELLED | OUTPATIENT
Start: 2024-04-12

## 2024-04-12 RX ORDER — DIPHENHYDRAMINE HCL 25 MG
25 CAPSULE ORAL ONCE
Status: CANCELLED | OUTPATIENT
Start: 2024-04-12

## 2024-04-12 NOTE — PATIENT INSTRUCTIONS
Refill policies:    Allow 2-3 business days for refills; controlled substances may take longer.  Contact your pharmacy at least 5 days prior to running out of medication and have them send an electronic request or submit request through the “request refill” option in your [employer] account.  Refills are not addressed on weekends; covering physicians do not authorize routine medications on weekends.  No narcotics or controlled substances are refilled after noon on Fridays or by on call physicians.  By law, narcotics must be electronically prescribed.  A 30 day supply with no refills is the maximum allowed.  If your prescription is due for a refill, you may be due for a follow up appointment.  To best provide you care, patients receiving routine medications need to be seen at least once a year.  Patients receiving narcotic/controlled substance medications need to be seen at least once every 3 months.  In the event that your preferred pharmacy does not have the requested medication in stock (e.g. Backordered), it is your responsibility to find another pharmacy that has the requested medication available.  We will gladly send a new prescription to that pharmacy at your request.    Scheduling Tests:    If your physician has ordered radiology tests such as MRI or CT scans, please contact Central Scheduling at [phone] right away to schedule the test.  Once scheduled, the [facility] Centralized Referral Team will work with your insurance carrier to obtain pre-certification or prior authorization.  Depending on your insurance carrier, approval may take 3-10 days.  It is highly recommended patients assure they have received an authorization before having a test performed.  If test is done without insurance authorization, patient may be responsible for the entire amount billed.      Precertification and Prior Authorizations:  If your physician has recommended that you have a procedure or additional testing performed the [facility]  Centralized Referral Team will contact your insurance carrier to obtain pre-certification or prior authorization.    You are strongly encouraged to contact your insurance carrier to verify that your procedure/test has been approved and is a COVERED benefit.  Although the [facility] Centralized Referral Team does its due diligence, the insurance carrier gives the disclaimer that \"Although the procedure is authorized, this does not guarantee payment.\"    Ultimately the patient is responsible for payment.   Thank you for your understanding in this matter.  Paperwork Completion:  If you require FMLA or disability paperwork for your recovery, please make sure to either drop it off or have it faxed to our office at [phone]. Be sure the form has your name and date of birth on it.  The form will be faxed to our Forms Department and they will complete it for you.  There is a 25$ fee for all forms that need to be filled out.  Please be aware there is a 10-14 day turnaround time.  You will need to sign a release of information (NAMITA) form if your paperwork does not come with one.  You may call the Forms Department with any questions at [phone].  Their fax number is [phone].

## 2024-04-12 NOTE — PROGRESS NOTES
[facility] Progress Note    HPI    Chief Complaint   Patient presents with    Neuropathy     Follow up 4 month follow up.neuropathy. states that he is getting cramps in both hands        As per my initial H&P from 8/24/2023,   \" Wil Llanos is a 73 year old, who presents for evaluation of multiple complaints.  He started to have cramping in both legs in the past 6 months.  He also had numbness in toes 3 yrs ago.  He states he intermittently is having \"locking up\" of hands with thumb moving inward on R or L side - states this has been in the past few weeks.     He was recommended to have NCS/EMG of both arms.  He has some neck pain but not into the hands but sometimes has dropping of objects from hands.     He also has intermittent \"blackness\" in R eye for 5 minutes with no associated n/v, or headache or focal numbness/tingling/weakness or loss of awareness.      He has noted some pain in the legs below the knee and toe with \"curl upward\" at times. .       He has had EMG both arms 35 yrs ago and noted he was weak in left arm / hand on exam     Otherwise, patient denies any recent weight change, fevers, chills, nausea, double vision/ blurry vision / loss of vision, chest pain, palpitations, shortness of breath, rashes, joint pains, bowel / bladder incontinence or mood issues. \"        Prior notes as per 10/24/2023.  Patient since last visit has continued to have locking up of hands with thumb moveing inward on R or L side.  He is on gabapentin 600 mg AM / 300 mg PM / 600 mg nightly now with some improvement in pain at night; he notes improvement in curling toes - he feels he is \"wired, like he drank a lot of coffee\" since he has been on higher dose of gabapentin but otherwise has been tolerating; he feels he may be having breakthrough symptoms and tolerance         He admits he has had some tripping and falls on uneven ground but denies worsening.           Prior notes as per 12/12/2023.   Patient last seen 10/24/2023.  He is now on gabapentin at 600 mg tid - notes some cramping in both legs around the foot, toes curling at night and cramps in the back of the calf at night - notes he has cramping in legs when waking up at night - denies excessive sedation on gabapentin; has some swelling in ankles occasionally; he denies falls recently.  He also has locking up of hands intermittently.       Patient last seen 2023. He has had one session IVIG x5 days  to 3/1. He has been having cramping in legs waking him up at night and has locking up of hands as well; he had headache after IVIG but this persisted several days after infusion was done; no recent falls and now on gabapentin up to 900 mg tid.     Past Medical History:    AAA (abdominal aortic aneurysm) (HCC)    Allergic rhinitis    Anesthesia complication    Wakes up violent    Arrhythmia    afib    Cataract    Complex sleep apnea syndrome    Diverticulosis of large intestine    Essential hypertension    High blood pressure    Osteoarthritis    Polyneuropathy    Sleep apnea    Umbilical hernia without obstruction or gangrene     Past Surgical History:   Procedure Laterality Date    Arthroscopy of joint unlisted Right     Meniscectomy    Other      Nasal surgery    Other      Hemorrhoidectomy and fistula repair    Tonsillectomy      Total hip replacement Left      Family History   Problem Relation Age of Onset    Heart Disorder Father     Other (Other) Mother         Alzheimer's     Social History     Socioeconomic History    Marital status:    Tobacco Use    Smoking status: Former     Current packs/day: 0.00     Average packs/day: 1 pack/day for 20.0 years (20.0 ttl pk-yrs)     Types: Cigarettes     Start date: 1981     Quit date: 2001     Years since quittin.7     Passive exposure: Never    Smokeless tobacco: Never    Tobacco comments:     Quit 20+ years ago per pt   Vaping Use    Vaping status: Never Used   Substance and  Sexual Activity    Alcohol use: Yes     Comment: OCC    Drug use: Never   Other Topics Concern    Caffeine Concern Yes     Comment: 1 cup a day    Exercise No       Allergies   Allergen Reactions    Amlodipine SWELLING    Penicillins SWELLING    Rivaroxaban HIVES and ITCHING     Hives on legs      Vancomycin HALLUCINATION, ITCHING, OTHER (SEE COMMENTS), RASH and UNKNOWN     Elevated temperature    Hydralazine ITCHING     Red tablet pt is allergic to, takes orange tab is okay no problems      Losartan FACE FLUSHING    Ace Inhibitors Coughing         Current Outpatient Medications:     gabapentin 300 MG Oral Cap, 3 pills three times a day, 2700 mg total, Disp: 810 capsule, Rfl: 1    methylPREDNISolone 4 MG Oral Tablet Therapy Pack, Take as directed, Disp: 1 each, Rfl: 0    Coenzyme Q10-Vitamin E (QUNOL ULTRA COQ10 OR), Take 100 mg by mouth daily., Disp: , Rfl:     hydrALAZINE 50 MG Oral Tab, Take 1 tablet (50 mg total) by mouth every morning. Take with 100mg in AM, Disp: , Rfl:     labetalol 200 MG Oral Tab, Take 1 tablet (200 mg total) by mouth 2 (two) times daily., Disp: , Rfl:     hydrALAZINE 100 MG Oral Tab, Take 1 tablet (100 mg total) by mouth 3 (three) times daily., Disp: , Rfl:     Ciclopirox (CICLODAN) 8 % External Solution, Apply topically nightly., Disp: , Rfl:     torsemide 20 MG Oral Tab, Take 1 tablet (20 mg total) by mouth in the morning and 1 tablet (20 mg total) before bedtime., Disp: , Rfl:     spironolactone 25 MG Oral Tab, Take 1 tablet (25 mg total) by mouth daily., Disp: , Rfl:     Fluocinolone Acetonide 0.01 % Otic Oil, INSTILL 5 DROPS INTO BOTH EARS TWICE DAILY AS NEEDED, Disp: , Rfl:     atorvastatin 40 MG Oral Tab, Take 1 tablet (40 mg total) by mouth nightly., Disp: , Rfl:     apixaban 5 MG Oral Tab, Take by mouth 2 (two) times daily., Disp: , Rfl:     Review of Systems:  No chest pain or palpitations; otherwise as noted in HPI.    Exam:  /60   Pulse 50   Resp 18   Ht 74\"   Wt 275  lb (124.7 kg)   SpO2 98%   BMI 35.31 kg/m²   Estimated body mass index is 35.31 kg/m² as calculated from the following:    Height as of this encounter: 74\".    Weight as of this encounter: 275 lb (124.7 kg).    Gen: well developed, well nourished, no acute distress  HEENT: normocephalic  Heart; normal S1/S2, regular rate and rhythm  Lungs: clear to auscultation bilaterally  Extremities: no edema, peripheral pulses intact    Neck: supple, full range of motion; no carotid bruits    Fundoscopic Exam: optic discs sharp bilaterally    Neuro:  Mental status:  Orientation: Alert and oriented to person, place, time  Speech Fluent and conversational    CN: PERRL, EOMI with no nystagmus, VFF, smile symmetric, sensation intact, tongue and palate midline, SCM intact, otherwise, CN 2-12 intact  Motor:  5/5 throughout with following exceptions: left UE APB 4+/5, otherwise intact   DTR:  2+ in UE and patellar; toes downgoing; no clonus; trace ankle jerks bilaterally    Sensory: Pin is intact in LE today ; intact in UE  Vibration is 7 sec to extinguish R thumb; 6 L thumb in UE; 5 on R at malleolus, 5 on L; absent toes bilaterally  Proprioception is normal  Coord: FNF intact with no tremor or dysmetria; rapid alternating movements intact bilaterally  Romberg: absent  Gait: Casual gait antalgic, mildly broad based but able to walk on heels and toes briefly; cannot tandem at all; no cane or walker needed     Labs:    None new    Prior as noted below    Component      Latest Ref Rn 10/26/2023   PROTEIN, TOTAL      5.7 - 8.2 g/dL 7.2    Albumin      3.8 - 4.8 g/dL 4.62    ALPHA-1-GLOBULINS      0.19 - 0.46 g/dL 0.22    ALPHA-2-GLOBULINS      0.48 - 1.05 g/dL 0.76    BETA GLOBULINS      0.68 - 1.23 g/dL 0.68    GAMMA GLOBULINS      0.62 - 1.70 g/dL 0.92    ALBUMIN/GLOBULIN RATIO      1.00 - 2.00  1.79    SPE INTERPRETATION No apparent monoclonal protein on serum electrophoresis.    IMMUNOFIXATION No monoclonal protein detected by  immunofixation.…    KAPPA FREE LIGHT CHAIN      0.330 - 1.940 mg/dL 0.776    LAMBDA FREE LIGHT CHAIN      0.571 - 2.630 mg/dL 0.445 (L)    KAPPA/LAMBDA FLC RATIO      0.26 - 1.65  1.74 (H)    Case Report    GENERAL CATEGORIZATION:             FINAL DIAGNOSIS    Procedure    CLINICAL INFORMATION    Non Gyne Interpretation             Gross Description    WBC Calculated, CSF      /CUMM    Neutrophils CSF      0 - 6 %    Lymphocytes CSF      40 - 80 %    Mono/Macrophages CSF      15 - 45 %    EOSINOPHILS CSF      %    Basophil CSF      %    TOTAL CELLS COUNTED    GM1 Antibody, IgG      0 - 50 IV 3    GM1 Antibody, IgM      0 - 50 IV 3    GD1b Antibody, IgG      0 - 50 IV 4    GD1b Antibody, IgM      0 - 50 IV 3    GQ1b Antibody, IgG      0 - 50 IV 8    GQ1b Antibody, IgM      0 - 50 IV 3    TOTAL VOLUME CSF      mL    CSF TUBE #    Color CSF      Colorless     Clarity CSF      Clear     TNC, CSF      0 - 5 /CUMM    RBC CSF      <1 /CUMM    IMMUNOGLOBULIN G CSF      0.0 - 10.3 mg/dL    Albumin, CSF      15 - 55 mg/dL    IMMUNOGLOBULIN G      791 - 1,643 mg/dL 1,010    CSF IGG/ALBUMIN RATIO      0.00 - 0.25     IGG INDEX      0.0 - 0.7     MAG IgM Autoantibodies      0 - 999 BTU 1133 (H)    ACE, CSF      0.0 - 3.1 U/L    Glucose CSF      40 - 70 mg/dL    Total Protein CSF      15.0 - 45.0 mg/dL      Component      Latest Ref [facility] 11/2/2023   PROTEIN, TOTAL      5.7 - 8.2 g/dL    Albumin      3.8 - 4.8 g/dL 4.1    ALPHA-1-GLOBULINS      0.19 - 0.46 g/dL    ALPHA-2-GLOBULINS      0.48 - 1.05 g/dL    BETA GLOBULINS      0.68 - 1.23 g/dL    GAMMA GLOBULINS      0.62 - 1.70 g/dL    ALBUMIN/GLOBULIN RATIO      1.00 - 2.00     SPE INTERPRETATION    IMMUNOFIXATION    KAPPA FREE LIGHT CHAIN      0.330 - 1.940 mg/dL    LAMBDA FREE LIGHT CHAIN      0.571 - 2.630 mg/dL    KAPPA/LAMBDA FLC RATIO      0.26 - 1.65     Case Report Non-Gynecologic Cytology Case: [id number] …    GENERAL CATEGORIZATION:          Benign, inflammatory,  reactive or reparative changes    FINAL DIAGNOSIS This result contains rich text formatting which cannot be displayed here.    Procedure This result contains rich text formatting which cannot be displayed here.    CLINICAL INFORMATION This result contains rich text formatting which cannot be displayed here.    Non Gyne Interpretation          Benign    Gross Description This result contains rich text formatting which cannot be displayed here.    WBC Calculated, CSF      /CUMM 4    Neutrophils CSF      0 - 6 % 0    Lymphocytes CSF      40 - 80 % 65    Mono/Macrophages CSF      15 - 45 % 35    EOSINOPHILS CSF      % 0    Basophil CSF      % 0    TOTAL CELLS COUNTED 100    GM1 Antibody, IgG      0 - 50 IV    GM1 Antibody, IgM      0 - 50 IV    GD1b Antibody, IgG      0 - 50 IV    GD1b Antibody, IgM      0 - 50 IV    GQ1b Antibody, IgG      0 - 50 IV    GQ1b Antibody, IgM      0 - 50 IV    TOTAL VOLUME CSF      mL 19.0    CSF TUBE # 3    Color CSF      Colorless  Colorless    Clarity CSF      Clear  Clear    TNC, CSF      0 - 5 /CUMM 4    RBC CSF      <1 /CUMM 11 (H)    IMMUNOGLOBULIN G CSF      0.0 - 10.3 mg/dL 4.1    Albumin, CSF      15 - 55 mg/dL 38    IMMUNOGLOBULIN G      791 - 1,643 mg/dL 929    CSF IGG/ALBUMIN RATIO      0.00 - 0.25  0.11    IGG INDEX      0.0 - 0.7  0.5    MAG IgM Autoantibodies      0 - 999 BTU    ACE, CSF      0.0 - 3.1 U/L <1.5    Glucose CSF      40 - 70 mg/dL 58    Total Protein CSF      15.0 - 45.0 mg/dL 67.4 (H)      Component      Latest Ref Rn 11/7/2023   PROTEIN, TOTAL      5.7 - 8.2 g/dL    Albumin      3.8 - 4.8 g/dL    ALPHA-1-GLOBULINS      0.19 - 0.46 g/dL    ALPHA-2-GLOBULINS      0.48 - 1.05 g/dL    BETA GLOBULINS      0.68 - 1.23 g/dL    GAMMA GLOBULINS      0.62 - 1.70 g/dL    ALBUMIN/GLOBULIN RATIO      1.00 - 2.00     SPE INTERPRETATION    IMMUNOFIXATION    KAPPA FREE LIGHT CHAIN      0.330 - 1.940 mg/dL    LAMBDA FREE LIGHT CHAIN      0.571 - 2.630 mg/dL    KAPPA/LAMBDA  FLC RATIO      0.26 - 1.65     Case Report    GENERAL CATEGORIZATION:             FINAL DIAGNOSIS    Procedure    CLINICAL INFORMATION    Non Gyne Interpretation             Gross Description    WBC Calculated, CSF      /CUMM    Neutrophils CSF      0 - 6 %    Lymphocytes CSF      40 - 80 %    Mono/Macrophages CSF      15 - 45 %    EOSINOPHILS CSF      %    Basophil CSF      %    TOTAL CELLS COUNTED    GM1 Antibody, IgG      0 - 50 IV    GM1 Antibody, IgM      0 - 50 IV    GD1b Antibody, IgG      0 - 50 IV    GD1b Antibody, IgM      0 - 50 IV    GQ1b Antibody, IgG      0 - 50 IV    GQ1b Antibody, IgM      0 - 50 IV    TOTAL VOLUME CSF      mL    CSF TUBE #    Color CSF      Colorless     Clarity CSF      Clear     TNC, CSF      0 - 5 /CUMM    RBC CSF      <1 /CUMM    IMMUNOGLOBULIN G CSF      0.0 - 10.3 mg/dL    Albumin, CSF      15 - 55 mg/dL    IMMUNOGLOBULIN G      791 - 1,643 mg/dL 1,020    CSF IGG/ALBUMIN RATIO      0.00 - 0.25     IGG INDEX      0.0 - 0.7     MAG IgM Autoantibodies      0 - 999 BTU    ACE, CSF      0.0 - 3.1 U/L    Glucose CSF      40 - 70 mg/dL    Total Protein CSF      15.0 - 45.0 mg/dL       Legend:  (L) Low  (H) High    Component      Latest Ref [facility] 9/5/2023   PROTEIN, TOTAL      6.4 - 8.2 g/dL 7.0    Albumin      3.75 - 5.21 g/dL 4.31    ALPHA-1-GLOBULINS      0.19 - 0.46 g/dL 0.23    ALPHA-2-GLOBULINS      0.48 - 1.05 g/dL 0.76    BETA GLOBULINS      0.68 - 1.23 g/dL 0.71    GAMMA GLOBULINS      0.62 - 1.70 g/dL 0.99    ALBUMIN/GLOBULIN RATIO      1.00 - 2.00  1.60    SPE INTERPRETATION No apparent monoclonal protein on serum electrophoresis.…    IMMUNOFIXATION No monoclonal protein detected by immunofixation.…    KAPPA FREE LIGHT CHAIN      0.330 - 1.940 mg/dL 2.037 (H)    LAMBDA FREE LIGHT CHAIN      0.571 - 2.630 mg/dL 1.358    KAPPA/LAMBDA FLC RATIO      0.26 - 1.65  1.50    Expanded STEVIE Antibody Screen, IGG      <0.7 ug/l 0.20    Anti-dsDNA antibody      <10 IU/mL <0.6    Connective  Tissue Disease Screen Interpretation      Negative  Negative    Vitamin B12      193 - 986 pg/mL 352    C-REACTIVE PROTEIN      <0.30 mg/dL <0.29    SED RATE      0 - 20 mm/Hr 11    RHEUMATOID FACTOR      <15 IU/mL <10       Legend:  (H) High    Imaging:  None new    Prior as noted below    MRI SPINE CERVICAL (CPT=72141)    Result Date: 11/28/2023        FINDINGS:    CERVICAL DISC LEVELS:   C2-C3:  No significant disc/facet abnormality, spinal stenosis, or foraminal narrowing.   C3-C4:  There is mild disc desiccation.  There is a prominent right sided disc/osteophyte complex with associated bilateral extensive facet arthropathy and facet hypertrophy.  There is associated severe right neural foraminal stenosis and slightly less   pronounced moderate to severe left-sided neural foraminal stenosis.  No significant central canal stenosis.   C4-C5:  There is moderate disc desiccation with disc height loss and broad-based disc bulge.  There are bilateral uncovertebral joint disc/osteophyte complexes.  There is moderate bilateral facet arthropathy with hypertrophy.  There is mild central canal    stenosis and moderate to severe bilateral neural foraminal stenosis, greater on the right.   C5-C6:  There is moderate to extensive disc desiccation with disc height loss and broad-based disc bulge.  Bilateral uncovertebral joint disc/osteophyte complex is noted.  Moderate bilateral posterior articular facet joint arthropathy.  There is mild   central canal stenosis and moderate to severe bilateral neural foraminal stenosis.   C6-C7:  There is moderate to extensive disc desiccation with disc height loss and broad-based disc bulge.  There are prominent bilateral uncovertebral joint disc/osteophyte complexes.  There is moderate bilateral facet arthropathy with hypertrophy.    There is secondary mild central canal stenosis and moderate to severe bilateral neural foraminal stenosis.   C7-T1:  There is moderate disc desiccation with  disc height loss and broad-based disc bulge.  There is moderate bilateral facet arthropathy.  No significant central canal stenosis is noted.  There is mild to moderate bilateral neural foraminal stenosis,   greater on the left.      CRANIOCERVICAL AREA:  Normal foramen magnum with no Chiari malformation.    PARASPINAL AREA:  Normal with no visible mass.    BONY STRUCTURES:  There is no evidence of acute osseous injuries.  There is multilevel posterior facet arthropathy and uncovertebral joint arthropathy of the cervical spine as described above.  No lytic, blastic or destructive osseous changes.   CORD:  Normal caliber, contour, and signal intensity.       CONCLUSION:  1. No acute process. 2. Significant multilevel disc disease and facet arthropathy with multilevel severe neural foraminal stenosis and multilevel mild central canal stenosis as described above. 3. Normal appearance of the cervical spinal cord and visualized hind brain structures.   LOCATION:  [facility]   Dictated by (CST): Alberta Mercado DO on 11/28/2023 at 8:58 AM     Finalized by (CST): Alberta Mercado DO on 11/28/2023 at 9:04 AM          Other procedures  None new    Prior as noted below    NCS/EMG (8/29/2023):      Sensory NCS      Nerve / Sites Rec. Site Onset Lat Peak Lat NP Amp PP Amp Segments Distance Peak Diff Velocity Comment       ms ms µV µV   cm ms m/s     R Median - Dig II (Antidromic)      Wrist Index 3.33 4.22 9.3 21.2 Wrist - Index 15   45     R Ulnar - Dig V (Antidromic)      Wrist Dig V 2.71 3.59 9.9 8.2 Wrist - Dig V 13   48        2 Dig V 2.76 3.54 8.7 7.6             L Ulnar - Dig V (Antidromic)      Wrist Dig V 2.81 3.65 11.9 8.5 Wrist - Dig V 13   46     R Radial - Superficial (Antidromic)      Forearm Wrist 1.93 2.50 22.6 8.9 Forearm - Wrist 10   52     L Radial - Superficial (Antidromic)      Forearm Wrist 1.88 2.66 19.4 4.3 Forearm - Wrist 10   53     L Median - Dig III (Antidromic)      Wrist Index 3.07 3.96 9.0 19.1 Wrist -  Index 15   49        Palm Index 3.02 3.91 8.4 19.8 Palm - Index 7   23     R Median, Ulnar - Transcarpal comparison      Median Palm Wrist 2.03 2.60 16.5 23.4 Median Palm - Wrist 8   39        Ulnar Palm Wrist 1.41 1.93 9.8 8.4 Ulnar Palm - Wrist 8   57                 Median Palm - Ulnar Palm   0.68       L Median, Ulnar - Transcarpal comparison      Median Palm Wrist 1.56 2.19 26.7 40.0 Median Palm - Wrist 8   51        Ulnar Palm Wrist 1.61 2.08 9.3 14.8 Ulnar Palm - Wrist 8   50                 Median Palm - Ulnar Palm   0.10           Motor NCS      Nerve / Sites Muscle Latency Amplitude Segments Dist. Lat Diff Velocity Comments       ms mV   cm ms m/s     R Median - APB      Wrist APB 4.23 6.0 Wrist - APB 7            Elbow APB 9.96 5.5 Elbow - Wrist 27.5 5.73 48.0     L Median - APB      Wrist APB 3.56 8.3 Wrist - APB 7            Elbow APB 8.81 8.0 Elbow - Wrist 25.5 5.25 48.6     R Ulnar - ADM      Wrist ADM 2.85 9.4 Wrist - ADM 7            B.Elbow ADM 7.75 8.7 B.Elbow - Wrist 24.5 4.90 50.0     L Ulnar - ADM      Wrist ADM 2.79 9.7 Wrist - ADM 7            B.Elbow ADM 7.67 9.0 B.Elbow - Wrist 25.5 4.88 52.3         F  Wave      Nerve M Latency F Latency F-M Lat     ms ms ms   R Median - APB 4.6 35.1 30.4   R Ulnar - ADM 3.1 36.4 33.3   L Median - APB 3.8 33.7 29.9   L Ulnar - ADM 3.3 36.0 32.7                   EMG Summary Table       Spontaneous MUAP Recruitment   Muscle IA Fib PSW Fasc H.F. Amp Dur. PPP Pattern   L. Deltoid N None None None None N N N N   L. Biceps brachii N None None None None N N N N   L. Triceps brachii N None None None None N N N N   L. Extensor digitorum communis N None None None None N N N N   L. First dorsal interosseous N None None None None N N N N   L. Abductor pollicis brevis N None None None None N N N N   R. Deltoid N None None None None N N N N   R. Biceps brachii N None None None None N N N N   R. Triceps brachii N None None None None N N N N   R. Extensor digitorum  communis N None None None None N N N N   R. First dorsal interosseous N None None None None N N N N   R. Abductor pollicis brevis N None None None None N N N N            Findings:      Nerve conduction studies:   Right median motor response was borderline due to borderline conduction velocity with normal distal motor latency and amplitude; F wave response was prolonged.   Left median motor response was borderline due to borderline conduction velocity with normal distal motor latency and amplitude; F wave response was prolonged.   Right ulnar motor response was normal; F wave response was prolonged.  Left ulnar motor response was normal; F wave response was prolonged.   Right median sensory response was abnormal due to prolonged peak latency with reduced amplitude and slowed conduction velocity.   Left median sensory response was abnormal due to prolonged peak latency with reduced amplitude and slowed conduction velocity.   Right ulnar sensory response was borderline due to borderline prolonged peak latency, with borderline amplitude and borderline conduction velocity.   Left ulnar sensory response was borderline due to borderline prolonged peak latency, with borderline amplitude and borderline conduction velocity.   Right radial sensory response was normal.   Left radial sensory response was normal.   Right median to ulnar palmar mixed nerve comparison study was abnormal due to significant prolongation of median relative to ulnar nerve greater than 0.4 msec (~0.68 msec), consistent with focal slowing across the wrist.   Left median to ulnar palmar mixed nerve comparison study was normal.      EMG (needle exam):   Concentric needle EMG was performed on the selected muscles listed above in the table with the following findings: all muscles tested were normal.      Impression:   This is an abnormal study. 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  This  is demyelinating type with sensory involvement.  In addition, there is suggestion for mild sensory neuropathy, which is primary axonal.  There is no suggestion for a primary demyelinating neuropathy or myopathy.       Prior as noted below:     NCS /EMG (8/9/2023):     Sensory NCS      Nerve / Sites Rec. Site Onset Lat Peak Lat NP Amp PP Amp Segments Distance Velocity Comment       ms ms µV µV   cm m/s     R Sural - (Antidromic)      Calf Ankle 3.18 4.11 4.6 6.0 Calf - Ankle 14 44        2 Ankle                   L Sural - (Antidromic)      Calf Ankle NR NR NR NR Calf - Ankle 14 NR         Motor NCS      Nerve / Sites Muscle Latency Amplitude Segments Dist. Lat Diff Velocity Comments       ms mV   cm ms m/s     R Peroneal - EDB      Ankle EDB 5.73 1.3 Ankle - EDB 8            B. Fib Head EDB 15.58 1.2 B. Fib Head - Ankle 35 9.85 35.5     L Peroneal - EDB      Ankle EDB 5.71 1.2 Ankle - EDB 8            B. Fib Head EDB 15.67 1.0 B. Fib Head - Ankle 35 9.96 35.1     R Tibial - AH      Ankle AH 5.08 0.9 Ankle - AH 8            Knee AH 17.33 0.5 Knee - Ankle 44 12.25 35.9     L Tibial - AH      Ankle AH 5.56 1.0 Ankle - AH 8            Knee AH 17.10 0.6 Knee - Ankle 43.5 11.54 37.7         F  Wave      Nerve M Latency F Latency F-M Lat     ms ms ms   R Peroneal - EDB 6.4 51.6 45.2   R Tibial - AH 11.9 70.7 58.8   L Tibial - AH 6.2 71.4 65.2   L Peroneal - EDB 6.5 67.3 60.8                             EMG Summary Table       Spontaneous MUAP Recruitment   Muscle IA Fib PSW Fasc H.F. Amp Dur. PPP Pattern   R. Vastus medialis N None None None None N N N N   L. Vastus medialis N None None None None N N N N   R. Peroneus longus N None None None None N N N N   L. Peroneus longus N None None None None N N N N   R. Tibialis anterior N None None None None N N N N   L. Tibialis anterior N None None None None N N N N   R. Gastrocnemius N None None None None N N N N   L. Gastrocnemius N None None None None N N N N   R. Extensor  hallucis longus N None None None None N N N N   L. Extensor hallucis longus N None None None None N N N N            Summary:      Nerve conduction studies:  Right sural sensory response was borderline due to borderline amplitude with normal peak latency and normal conduction velocity.  Left sural sensory response was absent.   Right common peroneal motor response was abnormal due to reduced amplitude, with normal distal motor latency, and mildly slowed conduction velocity; F wave response was normal.  Left common peroneal motor response was abnormal due to reduced amplitude, with normal distal motor latency, and mildly slowed conduction velocity; F-wave response was mildly prolonged.  Right tibial motor response was abnormal due to reduced amplitude, with normal distal motor latency, and mildly slowed conduction velocity; F-wave response was prolonged; in addition, there was suggestion for temporal dispersion on proximal stimulation.  Left tibial motor response was abnormal due to reduced amplitude, with normal distal motor latency, and mildly slowed conduction velocity; F-wave response was prolonged; in addition, there was suggestion for temporal dispersion on proximal stimulation.     EMG (needle exam):  Concentric needle EMG was performed on the selected muscles listed above in the table, with the following findings: All muscles tested were normal     Impression:  This is an abnormal study.  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    There was no suggestion for myopathic units or acute denervation.  It should be noted that the testing was somewhat technically difficult due to patient's body habitus.      Clinical comment:   On the basis of this limited study, there are some findings of asymmetry, with worsening in the left lower extremity in terms of the sural sensory response.  In addition, there is some  suggestion for temporal dispersion in the right tibial motor response on proximal stimulation as well as in the left tibial motor response. While not meeting criteria for chronic inflammatory demyelinating polyneuropathy (CIDP), or a primary demyelinating neuropathy, the presence of asymmetric features and some uniformly prolonged F waves raises this possibility; alternatively, this may be due to technical factors related to patient's body habitus.  Additional testing with EMG of the upper extremities may provide additional information regarding the etiology of this likely chronic neuropathy.          Impression/ Plan:  Wil Llanos is a 74 year old male who originally presented 8/24/2023 for evaluation of multiple complaints.  He started to have cramping in both legs in the past 6 months.  He also had numbness in toes 3 yrs ago.  He states he intermittently is having \"locking up\" of hands with thumb moving inward on R or L side - states this has been in the past few weeks.       Neurologic exam shows decreased sensation in both feet with depressed DTRs distally as well as reduced sensation in UE to vibration. In addition, there is some weakness in APB muscles in UE bilaterally. Signs/symptoms are overall concerning for neuropathy.  In addition, NCS/EMG done of both legs showed some asymmetry with absent sural sensory response on left side and present but reduced on R side. Tibial motor response showed bilateral temporal dispersion proximally and this could suggest an atypical neuropathy or be due to artifact.      In order to further evaluate, NCS/EMG of both upper extremities was done and showed evidence for sensory neuropathy with axonal and demyelinating features along with median entrapment neuropathy across the right wrist, suggestive of carpal tunnel syndrome, which is mild.  Labs were done and showed mild elevation in kappa free light chain but normal ratio and borderline B12 level with normal ESR,  CRP, RF.  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      He has had some improvement and recommend continue IVIG 0.4 g/kg every 4 weeks for ~ 6 months to start and monitor for changes      Otherwise, for management of neuropathic pain, he is currently on gabapentin 900 mg tid and will continue.    1. 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 Neuropathy  As noted above     3. Weakness of both hands  As noted above     No follow-ups on file.    Mode Cox MD, Neurology  [facility]  Pager [phone]  4/12/2024

## 2024-04-12 NOTE — TELEPHONE ENCOUNTER
Per Dr. Cox, maintain IVIG therapy at 0.4 gm/kg every 4 weeks x 12 months.    Patient continues to have Medicare (no PA needed). He wishes to continue infusions at [facility].    [facility]

## 2024-04-15 ENCOUNTER — TELEPHONE (OUTPATIENT)
Dept: HEMATOLOGY/ONCOLOGY | Facility: HOSPITAL | Age: 74
End: 2024-04-15

## 2024-04-19 ENCOUNTER — OFFICE VISIT (OUTPATIENT)
Dept: HEMATOLOGY/ONCOLOGY | Age: 74
End: 2024-04-19
Attending: Other
Payer: MEDICARE

## 2024-04-19 VITALS
RESPIRATION RATE: 18 BRPM | WEIGHT: 276.5 LBS | TEMPERATURE: 98 F | SYSTOLIC BLOOD PRESSURE: 170 MMHG | HEART RATE: 51 BPM | BODY MASS INDEX: 36 KG/M2 | DIASTOLIC BLOOD PRESSURE: 71 MMHG | OXYGEN SATURATION: 95 %

## 2024-04-19 DIAGNOSIS — G61.89 POLYNEUROPATHY ASSOCIATED WITH MONOCLONAL IGM ANTIBODIES TO MYELIN-ASSOCIATED GLYCOPROTEIN (HCC): Primary | ICD-10-CM

## 2024-04-19 PROCEDURE — 96365 THER/PROPH/DIAG IV INF INIT: CPT

## 2024-04-19 PROCEDURE — 96366 THER/PROPH/DIAG IV INF ADDON: CPT

## 2024-04-19 RX ORDER — DIPHENHYDRAMINE HCL 25 MG
25 CAPSULE ORAL ONCE
Status: COMPLETED | OUTPATIENT
Start: 2024-04-19 | End: 2024-04-19

## 2024-04-19 RX ORDER — DIPHENHYDRAMINE HYDROCHLORIDE 50 MG/ML
25 INJECTION INTRAMUSCULAR; INTRAVENOUS ONCE
OUTPATIENT
Start: 2024-05-10

## 2024-04-19 RX ORDER — ACETAMINOPHEN 325 MG/1
650 TABLET ORAL ONCE
Status: COMPLETED | OUTPATIENT
Start: 2024-04-19 | End: 2024-04-19

## 2024-04-19 RX ORDER — DIPHENHYDRAMINE HYDROCHLORIDE 50 MG/ML
25 INJECTION INTRAMUSCULAR; INTRAVENOUS ONCE
Status: DISCONTINUED | OUTPATIENT
Start: 2024-04-19 | End: 2024-04-19

## 2024-04-19 RX ORDER — DIPHENHYDRAMINE HCL 25 MG
25 CAPSULE ORAL ONCE
OUTPATIENT
Start: 2024-05-10

## 2024-04-19 RX ORDER — ACETAMINOPHEN 325 MG/1
650 TABLET ORAL ONCE
OUTPATIENT
Start: 2024-05-10

## 2024-04-19 RX ADMIN — ACETAMINOPHEN 650 MG: 325 TABLET ORAL at 10:57:00

## 2024-04-19 RX ADMIN — DIPHENHYDRAMINE HCL 25 MG: 25 MG CAPSULE ORAL at 10:57:00

## 2024-04-19 NOTE — PROGRESS NOTES
Learner:  Patient     Disease / Diagnosis: IVIG     Barriers / Limitations:  None                Comments:     Method:  Discussion                Comments:     General Topics:  Medication, Side effects and symptom management, and Plan of care reviewed                Comments:     Outcome:  Shows understanding                Comments:     IVIG infused without incident. Patient discharged in stable condition.

## 2024-05-07 ENCOUNTER — LAB ENCOUNTER (OUTPATIENT)
Dept: LAB | Age: 74
End: 2024-05-07
Attending: INTERNAL MEDICINE
Payer: MEDICARE

## 2024-05-07 DIAGNOSIS — E78.5 HYPERLIPEMIA: Primary | ICD-10-CM

## 2024-05-07 DIAGNOSIS — I10 ESSENTIAL HYPERTENSION, MALIGNANT: ICD-10-CM

## 2024-05-07 LAB
ALBUMIN SERPL-MCNC: 3.8 G/DL (ref 3.4–5)
ALBUMIN/GLOB SERPL: 1.1 {RATIO} (ref 1–2)
ALP LIVER SERPL-CCNC: 111 U/L
ALT SERPL-CCNC: 53 U/L
ANION GAP SERPL CALC-SCNC: 3 MMOL/L (ref 0–18)
AST SERPL-CCNC: 63 U/L (ref 15–37)
BILIRUB SERPL-MCNC: 0.4 MG/DL (ref 0.1–2)
BUN BLD-MCNC: 30 MG/DL (ref 9–23)
CALCIUM BLD-MCNC: 9.4 MG/DL (ref 8.5–10.1)
CHLORIDE SERPL-SCNC: 105 MMOL/L (ref 98–112)
CHOLEST SERPL-MCNC: 149 MG/DL (ref ?–200)
CO2 SERPL-SCNC: 32 MMOL/L (ref 21–32)
CREAT BLD-MCNC: 1.56 MG/DL
EGFRCR SERPLBLD CKD-EPI 2021: 47 ML/MIN/1.73M2 (ref 60–?)
FASTING PATIENT LIPID ANSWER: YES
FASTING STATUS PATIENT QL REPORTED: YES
GLOBULIN PLAS-MCNC: 3.6 G/DL (ref 2.8–4.4)
GLUCOSE BLD-MCNC: 88 MG/DL (ref 70–99)
HDLC SERPL-MCNC: 67 MG/DL (ref 40–59)
LDLC SERPL CALC-MCNC: 68 MG/DL (ref ?–100)
NONHDLC SERPL-MCNC: 82 MG/DL (ref ?–130)
OSMOLALITY SERPL CALC.SUM OF ELEC: 296 MOSM/KG (ref 275–295)
POTASSIUM SERPL-SCNC: 4 MMOL/L (ref 3.5–5.1)
PROT SERPL-MCNC: 7.4 G/DL (ref 6.4–8.2)
SODIUM SERPL-SCNC: 140 MMOL/L (ref 136–145)
TRIGL SERPL-MCNC: 74 MG/DL (ref 30–149)
VLDLC SERPL CALC-MCNC: 11 MG/DL (ref 0–30)

## 2024-05-07 PROCEDURE — 80053 COMPREHEN METABOLIC PANEL: CPT

## 2024-05-07 PROCEDURE — 36415 COLL VENOUS BLD VENIPUNCTURE: CPT

## 2024-05-07 PROCEDURE — 80061 LIPID PANEL: CPT

## 2024-05-17 ENCOUNTER — OFFICE VISIT (OUTPATIENT)
Dept: HEMATOLOGY/ONCOLOGY | Age: 74
End: 2024-05-17
Attending: Other

## 2024-05-17 VITALS
WEIGHT: 275.69 LBS | DIASTOLIC BLOOD PRESSURE: 71 MMHG | OXYGEN SATURATION: 96 % | RESPIRATION RATE: 16 BRPM | TEMPERATURE: 98 F | BODY MASS INDEX: 35 KG/M2 | HEART RATE: 50 BPM | SYSTOLIC BLOOD PRESSURE: 149 MMHG

## 2024-05-17 DIAGNOSIS — G61.89 POLYNEUROPATHY ASSOCIATED WITH MONOCLONAL IGM ANTIBODIES TO MYELIN-ASSOCIATED GLYCOPROTEIN (HCC): Primary | ICD-10-CM

## 2024-05-17 PROCEDURE — 96365 THER/PROPH/DIAG IV INF INIT: CPT

## 2024-05-17 PROCEDURE — 96366 THER/PROPH/DIAG IV INF ADDON: CPT

## 2024-05-17 RX ORDER — DIPHENHYDRAMINE HCL 25 MG
25 CAPSULE ORAL ONCE
Status: COMPLETED | OUTPATIENT
Start: 2024-05-17 | End: 2024-05-17

## 2024-05-17 RX ORDER — DIPHENHYDRAMINE HCL 25 MG
25 CAPSULE ORAL ONCE
OUTPATIENT
Start: 2024-06-14

## 2024-05-17 RX ORDER — ACETAMINOPHEN 325 MG/1
650 TABLET ORAL ONCE
Status: COMPLETED | OUTPATIENT
Start: 2024-05-17 | End: 2024-05-17

## 2024-05-17 RX ORDER — ACETAMINOPHEN 325 MG/1
650 TABLET ORAL ONCE
OUTPATIENT
Start: 2024-06-14

## 2024-05-17 RX ORDER — DIPHENHYDRAMINE HYDROCHLORIDE 50 MG/ML
25 INJECTION INTRAMUSCULAR; INTRAVENOUS ONCE
OUTPATIENT
Start: 2024-06-14

## 2024-05-17 RX ADMIN — DIPHENHYDRAMINE HCL 25 MG: 25 MG CAPSULE ORAL at 10:41:00

## 2024-05-17 RX ADMIN — ACETAMINOPHEN 650 MG: 325 TABLET ORAL at 10:41:00

## 2024-05-17 NOTE — PROGRESS NOTES
Education Record     Learner:  Patient     Disease / Diagnosis: IVIG     Barriers / Limitations:  none                Comments:     Method:  Discussion                Comments:     General Topics:  Plan of care reviewed                Comments:     Outcome:  Shows understanding                Comments:  Pt tolerated IVIG infusion, without complications. Pt dc home ambulatory in stable condition, no complaints.

## 2024-06-14 ENCOUNTER — OFFICE VISIT (OUTPATIENT)
Dept: HEMATOLOGY/ONCOLOGY | Age: 74
End: 2024-06-14
Attending: Other
Payer: MEDICARE

## 2024-06-14 VITALS
RESPIRATION RATE: 16 BRPM | SYSTOLIC BLOOD PRESSURE: 147 MMHG | BODY MASS INDEX: 36 KG/M2 | HEART RATE: 51 BPM | TEMPERATURE: 98 F | WEIGHT: 277.5 LBS | DIASTOLIC BLOOD PRESSURE: 62 MMHG | OXYGEN SATURATION: 95 %

## 2024-06-14 DIAGNOSIS — G61.89 POLYNEUROPATHY ASSOCIATED WITH MONOCLONAL IGM ANTIBODIES TO MYELIN-ASSOCIATED GLYCOPROTEIN (HCC): Primary | ICD-10-CM

## 2024-06-14 PROCEDURE — 96366 THER/PROPH/DIAG IV INF ADDON: CPT

## 2024-06-14 PROCEDURE — 96365 THER/PROPH/DIAG IV INF INIT: CPT

## 2024-06-14 RX ORDER — ACETAMINOPHEN 325 MG/1
650 TABLET ORAL ONCE
OUTPATIENT
Start: 2024-07-12

## 2024-06-14 RX ORDER — ACETAMINOPHEN 325 MG/1
650 TABLET ORAL ONCE
Status: COMPLETED | OUTPATIENT
Start: 2024-06-14 | End: 2024-06-14

## 2024-06-14 RX ORDER — DIPHENHYDRAMINE HCL 25 MG
25 CAPSULE ORAL ONCE
Status: COMPLETED | OUTPATIENT
Start: 2024-06-14 | End: 2024-06-14

## 2024-06-14 RX ORDER — DIPHENHYDRAMINE HYDROCHLORIDE 50 MG/ML
25 INJECTION INTRAMUSCULAR; INTRAVENOUS ONCE
OUTPATIENT
Start: 2024-07-12

## 2024-06-14 RX ORDER — DIPHENHYDRAMINE HCL 25 MG
25 CAPSULE ORAL ONCE
OUTPATIENT
Start: 2024-07-12

## 2024-06-14 RX ADMIN — DIPHENHYDRAMINE HCL 25 MG: 25 MG CAPSULE ORAL at 10:48:00

## 2024-06-14 RX ADMIN — ACETAMINOPHEN 650 MG: 325 TABLET ORAL at 10:48:00

## 2024-06-14 NOTE — PROGRESS NOTES
Pt here for IVIG infusion. Pt denies any new issues or concerns.      Ordering MD: Kenny  Order Exp: 9/12 doses remaining     Pt tolerated infusion without difficulty or complaint. Reviewed next appt date/time. Pt discharged home in stable condition.      Education Record  Learner:  Patient  Disease / Diagnosis: IVIG infusion  Barriers / Limitations:  None  Method:  Discussion  General Topics:  Plan of care reviewed  Outcome:  Shows understanding

## 2024-06-19 DIAGNOSIS — G62.9 NEUROPATHY: ICD-10-CM

## 2024-06-19 RX ORDER — GABAPENTIN 300 MG/1
CAPSULE ORAL
Qty: 810 CAPSULE | Refills: 1 | Status: SHIPPED | OUTPATIENT
Start: 2024-06-19 | End: 2024-09-16

## 2024-06-19 NOTE — TELEPHONE ENCOUNTER
Medication: GABAPENTIN 300 MG      Date of last refill: 3/19/24 (#810/1R)  [place]     Last office visit: 4/12/24  Due back to clinic per last office note:  no mention  Date next office visit scheduled:    Future Appointments   Date Time Provider Department Center   6/29/2024  7:15 AM [facility] LAB [facility] [place]   7/12/2024 10:30 AM PF TX RN1 PF CHEMO [facility] [place]   7/26/2024 10:15 AM Hayder John MD EMG 14 [facility] [address]   8/9/2024 10:30 AM PF TX RN3 PF CHEMO [place]   8/14/2024 10:20 AM Mode Cox MD E[facility] EMG [place]   9/6/2024 10:30 AM PF TX RN3 PF CHEMO [place]           Last OV note recommendation:    Impression/ Plan:  Wil Llanos is a 74 year old male who originally presented 8/24/2023 for evaluation of multiple complaints.  He started to have cramping in both legs in the past 6 months.  He also had numbness in toes 3 yrs ago.  He states he intermittently is having \"locking up\" of hands with thumb moving inward on R or L side - states this has been in the past few weeks.       Neurologic exam shows decreased sensation in both feet with depressed DTRs distally as well as reduced sensation in UE to vibration. In addition, there is some weakness in APB muscles in UE bilaterally. Signs/symptoms are overall concerning for neuropathy.  In addition, NCS/EMG done of both legs showed some asymmetry with absent sural sensory response on left side and present but reduced on R side. Tibial motor response showed bilateral temporal dispersion proximally and this could suggest an atypical neuropathy or be due to artifact.       In order to further evaluate, NCS/EMG of both upper extremities was done and showed evidence for sensory neuropathy with axonal and demyelinating features along with median entrapment neuropathy across the right wrist, suggestive of carpal tunnel syndrome, which is mild.  Labs were done and showed mild elevation in kappa free  light chain but normal ratio and borderline B12 level with normal ESR, CRP, RF.  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       He has had some improvement and recommend continue IVIG 0.4 g/kg every 4 weeks for ~ 6 months to start and monitor for changes       Otherwise, for management of neuropathic pain, he is currently on gabapentin 900 mg tid and will continue.     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 Neuropathy  As noted above      3. Weakness of both hands  As noted above

## 2024-06-28 ENCOUNTER — LAB ENCOUNTER (OUTPATIENT)
Dept: LAB | Age: 74
End: 2024-06-28
Attending: INTERNAL MEDICINE

## 2024-06-28 DIAGNOSIS — E78.5 HYPERLIPEMIA: Primary | ICD-10-CM

## 2024-06-28 LAB
ALT SERPL-CCNC: 40 U/L
AST SERPL-CCNC: 31 U/L (ref 15–37)

## 2024-06-28 PROCEDURE — 36415 COLL VENOUS BLD VENIPUNCTURE: CPT

## 2024-06-28 PROCEDURE — 84460 ALANINE AMINO (ALT) (SGPT): CPT

## 2024-06-28 PROCEDURE — 84450 TRANSFERASE (AST) (SGOT): CPT

## 2024-07-12 ENCOUNTER — OFFICE VISIT (OUTPATIENT)
Dept: HEMATOLOGY/ONCOLOGY | Age: 74
End: 2024-07-12
Attending: Other
Payer: MEDICARE

## 2024-07-12 VITALS
SYSTOLIC BLOOD PRESSURE: 152 MMHG | HEART RATE: 45 BPM | OXYGEN SATURATION: 94 % | WEIGHT: 271 LBS | BODY MASS INDEX: 35 KG/M2 | DIASTOLIC BLOOD PRESSURE: 67 MMHG | TEMPERATURE: 97 F | RESPIRATION RATE: 18 BRPM

## 2024-07-12 DIAGNOSIS — G61.89 POLYNEUROPATHY ASSOCIATED WITH MONOCLONAL IGM ANTIBODIES TO MYELIN-ASSOCIATED GLYCOPROTEIN (HCC): Primary | ICD-10-CM

## 2024-07-12 PROCEDURE — 96366 THER/PROPH/DIAG IV INF ADDON: CPT

## 2024-07-12 PROCEDURE — 96365 THER/PROPH/DIAG IV INF INIT: CPT

## 2024-07-12 RX ORDER — ACETAMINOPHEN 325 MG/1
650 TABLET ORAL ONCE
OUTPATIENT
Start: 2024-08-09

## 2024-07-12 RX ORDER — DIPHENHYDRAMINE HYDROCHLORIDE 50 MG/ML
25 INJECTION INTRAMUSCULAR; INTRAVENOUS ONCE
OUTPATIENT
Start: 2024-08-09

## 2024-07-12 RX ORDER — ACETAMINOPHEN 325 MG/1
650 TABLET ORAL ONCE
Status: COMPLETED | OUTPATIENT
Start: 2024-07-12 | End: 2024-07-12

## 2024-07-12 RX ORDER — DIPHENHYDRAMINE HCL 25 MG
25 CAPSULE ORAL ONCE
Status: COMPLETED | OUTPATIENT
Start: 2024-07-12 | End: 2024-07-12

## 2024-07-12 RX ORDER — DIPHENHYDRAMINE HCL 25 MG
25 CAPSULE ORAL ONCE
OUTPATIENT
Start: 2024-08-09

## 2024-07-12 RX ADMIN — DIPHENHYDRAMINE HCL 25 MG: 25 MG CAPSULE ORAL at 10:42:00

## 2024-07-12 RX ADMIN — ACETAMINOPHEN 650 MG: 325 TABLET ORAL at 10:42:00

## 2024-07-12 NOTE — PROGRESS NOTES
Education Record    Learner:  Patient    Disease / Diagnosis: patient  here for IVIG infusion     Barriers / Limitations:  None    Method:  Brief focused, printed material and  reinforcement    General Topics:  Plan of care reviewed    Outcome:  Shows understanding   Patient  tolerated infusion, no c/o. Dc'd home in stable condition.   Order 8 of 12. Next appt made.

## 2024-07-13 RX ORDER — ATORVASTATIN CALCIUM 40 MG/1
40 TABLET, FILM COATED ORAL NIGHTLY
Qty: 90 TABLET | Refills: 0 | Status: SHIPPED | OUTPATIENT
Start: 2024-07-13

## 2024-07-13 NOTE — TELEPHONE ENCOUNTER
Last time medication was refilled 05/26/2021  Last office visit  02/08/2024  Next office visit due/scheduled   Future Appointments   Date Time Provider Department Center   7/26/2024 10:15 AM Hayder John MD EMG 14 [facility] [address]   8/9/2024 10:30 AM PF TX RN3 PF CHEMO [facility] [place]   8/14/2024 10:20 AM Mode Cox MD E[facility] EMG [place]   9/6/2024 10:30 AM PF TX RN3 PF CHEMO [place]       Passed protocol, Medication has not been refilled since 2021.  Sent to Doctor John for approval.

## 2024-07-25 NOTE — PROGRESS NOTES
Subjective:   Wil Llanos is a 74 year old male who presents for a Subsequent Annual Wellness visit (Pt already had Initial Annual Wellness) and scheduled follow up of multiple significant but stable problems.   HPI  Normal state of health  No issues with meds  Denies cp or sob    PAST MEDICAL, SOCIAL, FAMILY HISTORIES REVIEWED WITH PT      History/Other:   Fall Risk Assessment:   He has been screened for Falls and is low risk.      Cognitive Assessment:   He had a completely normal cognitive assessment - see flowsheet entries       Functional Ability/Status:   Wil Llanos has some abnormal functions as listed below:  He has difficulties Affording Meds based on screening of functional status.       Depression Screening (PHQ):  PHQ-2 SCORE: 0  , done 7/26/2024            Advanced Directives:   He does have a Living Will but we do NOT have it on file in Epic.    He has a Power of  for Health Care on file in beModel.  Discussed Advance Care Planning with patient (and family/surrogate if present). Standard forms made available to patient in After Visit Summary.      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    Current Medications:  Outpatient Medications Marked as Taking for the 7/26/24 encounter (Office Visit) with Hayder John MD   Medication Sig     atorvastatin 40 MG Oral Tab Take 1 tablet (40 mg total) by mouth nightly.    gabapentin 300 MG Oral Cap TAKE THREE CAPSULES BY MOUTH THREE TIMES DAILY    Coenzyme Q10-Vitamin E (QUNOL ULTRA COQ10 OR) Take 100 mg by mouth daily.    hydrALAZINE 50 MG Oral Tab Take 1 tablet (50 mg total) by mouth every morning. Take with 100mg in AM    labetalol 200 MG Oral Tab Take 1 tablet (200 mg total) by mouth 2 (two) times daily.    hydrALAZINE 100 MG Oral Tab Take 1 tablet (100 mg total) by mouth 3 (three) times daily.    Ciclopirox (CICLODAN) 8 % External Solution Apply topically nightly.    torsemide 20 MG Oral Tab Take 1 tablet (20 mg total) by mouth in the morning and 1 tablet (20 mg total) before bedtime.    spironolactone 25 MG Oral Tab Take 1 tablet (25 mg total) by mouth daily.    Fluocinolone Acetonide 0.01 % Otic Oil INSTILL 5 DROPS INTO BOTH EARS TWICE DAILY AS NEEDED    apixaban 5 MG Oral Tab Take by mouth 2 (two) times daily.       Medical History:  He  has a past medical history of AAA (abdominal aortic aneurysm) (HCC), Allergic rhinitis, Anesthesia complication, Arrhythmia, Cataract, Complex sleep apnea syndrome (2016), Diverticulosis of large intestine, Essential hypertension, High blood pressure, Osteoarthritis, Polyneuropathy, Sleep apnea, and Umbilical hernia without obstruction or gangrene.  Surgical History:  He  has a past surgical history that includes tonsillectomy; total hip replacement (Left); arthroscopy of joint unlisted (Right); other; and other.   Family History:  His family history includes Heart Disorder in his father; Other in his mother.  Social History:  He  reports that he quit smoking about 23 years ago. His smoking use included cigarettes. He started smoking about 43 years ago. He has a 20 pack-year smoking history. He has never been exposed to tobacco smoke. He has never used smokeless tobacco. He reports current alcohol use. He reports that he does not use drugs.    Tobacco:  He smoked  tobacco in the past but quit greater than 12 months ago.  Social History     Tobacco Use   Smoking Status Former    Current packs/day: 0.00    Average packs/day: 1 pack/day for 20.0 years (20.0 ttl pk-yrs)    Types: Cigarettes    Start date: 1981    Quit date: 2001    Years since quittin.0    Passive exposure: Never   Smokeless Tobacco Never   Tobacco Comments    Quit 20+ years ago per pt        CAGE Alcohol Screen:   CAGE screening score of 0 on 2024, showing low risk of alcohol abuse.      Patient Care Team:  Hayder John MD as PCP - General (Internal Medicine)  Julienne Segovia MD (SURGERY, ORTHOPEDIC)  Chance Reyes MD (CARDIOLOGY)  Dana Luque PA (Physician Assistant)    Review of Systems  A comprehensive 10 point review of systems was completed.     Pertinent positives and negatives noted in the HPI.      Objective:   Physical Exam  General: No acute distress. Alert and oriented x 3.  HEENT: Normocephalic atraumatic. Moist mucous membranes. EOM-I. PERRLA. Anicteric.  Neck: No lymphadenopathy.   Respiratory: Clear to auscultation bilaterally. No wheezes. No rhonchi.  Cardiovascular: S1, S2. Regular rate and rhythm  Abdomen: Soft,  no pain.  nontender, nondistended.  Positive bowel sounds. .  Extremities: No edema or cyanosis.  Integument: No rashes or lesions.    Psychiatric: Appropriate mood and affect.    /72   Pulse 83   Temp 98.1 °F (36.7 °C)   Resp 16   Ht 6' 2\" (1.88 m)   Wt 274 lb (124.3 kg)   SpO2 98%   BMI 35.18 kg/m²  Estimated body mass index is 35.18 kg/m² as calculated from the following:    Height as of this encounter: 6' 2\" (1.88 m).    Weight as of this encounter: 274 lb (124.3 kg).    Medicare Hearing Assessment:   Hearing Screening    Screening Method: Whisper Test  Whisper Test Result: Pass               Assessment & Plan:   Wil Llanos is a 74 year old male who presents for a Medicare Assessment.     1. Annual physical exam (Primary)  2.  Essential hypertension  Overview:  Last Assessment & Plan:   Formatting of this note might be different from the original.  He will check his blood pressures at home.  We discussed controversies regarding guidelines and management of high blood pressure.  Less than 140/90 would at least be reasonable.  I suggested exercise, weight loss, and reasonable limitation of caffeine and salt.  If his blood pressures trend higher than desirable then we can always adjust the dose of hydralazine.  3. Mixed hyperlipidemia  4. Paroxysmal atrial fibrillation (HCC)  5. Atherosclerosis of native coronary artery of native heart without angina pectoris  Overview:  Formatting of this note might be different from the original.  abnl CT chest, negative EST, sees cardiology  6. Ectatic abdominal aorta (HCC)  Overview:  Formatting of this note might be different from the original.  sees cardiology  Formatting of this note might be different from the original.  sees cardiology  Formatting of this note might be different from the original.  sees cardiology  7. Morbid (severe) obesity due to excess calories (HCC)  8. Obstructive sleep apnea syndrome  Overview:  Formatting of this note might be different from the original.  sent to pul med 2017  Formatting of this note might be different from the original.  sent to pul med 2017  Formatting of this note might be different from the original.  sent to pul med 2017  9. 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 Primary osteoarthritis of both knees  11. Pulmonary hypertension, primary (HCC)  12. Subclinical hypothyroidism  13. Encounter for annual health examination  14. Prostate cancer screening  -     PSA Total, Screen; Future; Expected date: 07/25/2024     Abdominal aortic aneurysm (AAA) (HCC)-stable. continue control of cad risk factors. recheck CTA abd next visit     Atherosclerosis of coronary artery-stable. continue control of cad  risk factors. Cont moderate intense dose of statin     Essential hypertension-controlled.. cont current med plan     Hyperlipidemia-controlled. Cont current med therapy     Obstructive sleep apnea syndrome- refer to sleep service for titration     Paroxysmal atrial fibrillation (HCC)-rate controlled. On DOAC. Refer to cardiology     Primary osteoarthritis of both knees- stable on meloxicam. Refer to ortho     Primary osteoarthritis of right hand- stable. Refer to ortho hand for contracture therapy     Vaccines are up to date.    The patient indicates understanding of these issues and agrees to the plan.  Continue with current treatment plan.  Lab work ordered.  Reinforced healthy diet, lifestyle, and exercise.      Return in about 6 months (around 1/26/2025) for AWV.     Hayder John MD, 7/25/2024     Supplementary Documentation:   General Health:  In the past six months, have you lost more than 10 pounds without trying?: 2 - No  Has your appetite been poor?: No  Type of Diet: Balanced  How does the patient maintain a good energy level?: Daily Walks  How would you describe your daily physical activity?: Moderate  How would you describe your current health state?: Good  How do you maintain positive mental well-being?: Visiting Friends  At any time do you feel concerned for the safety/well-being of yourself and/or your children, in your home or elsewhere?: No  Have you had any immunizations at another office such as Influenza, Hepatitis B, Tetanus, or Pneumococcal?: No    Health Maintenance   Topic Date Due    HTN: BP Follow-Up  05/12/2024    Annual Physical  07/25/2024    PSA  07/07/2024    Influenza Vaccine (1) 10/01/2024    Colorectal Cancer Screening  06/07/2028    Annual Depression Screening  Completed    Fall Risk Screening (Annual)  Completed    Pneumococcal Vaccine: 65+ Years  Completed    Zoster Vaccines  Completed    COVID-19 Vaccine  Completed

## 2024-07-26 ENCOUNTER — LAB ENCOUNTER (OUTPATIENT)
Dept: LAB | Age: 74
End: 2024-07-26
Attending: INTERNAL MEDICINE
Payer: MEDICARE

## 2024-07-26 ENCOUNTER — OFFICE VISIT (OUTPATIENT)
Dept: INTERNAL MEDICINE CLINIC | Facility: CLINIC | Age: 74
End: 2024-07-26
Payer: MEDICARE

## 2024-07-26 VITALS
WEIGHT: 274 LBS | HEART RATE: 83 BPM | HEIGHT: 74 IN | SYSTOLIC BLOOD PRESSURE: 124 MMHG | DIASTOLIC BLOOD PRESSURE: 72 MMHG | OXYGEN SATURATION: 98 % | BODY MASS INDEX: 35.16 KG/M2 | RESPIRATION RATE: 16 BRPM | TEMPERATURE: 98 F

## 2024-07-26 DIAGNOSIS — I10 ESSENTIAL HYPERTENSION: ICD-10-CM

## 2024-07-26 DIAGNOSIS — E03.8 SUBCLINICAL HYPOTHYROIDISM: ICD-10-CM

## 2024-07-26 DIAGNOSIS — E78.2 MIXED HYPERLIPIDEMIA: ICD-10-CM

## 2024-07-26 DIAGNOSIS — I27.0 PULMONARY HYPERTENSION, PRIMARY (HCC): ICD-10-CM

## 2024-07-26 DIAGNOSIS — E66.01 MORBID (SEVERE) OBESITY DUE TO EXCESS CALORIES (HCC): ICD-10-CM

## 2024-07-26 DIAGNOSIS — G61.89 POLYNEUROPATHY ASSOCIATED WITH MONOCLONAL IGM ANTIBODIES TO MYELIN-ASSOCIATED GLYCOPROTEIN (HCC): ICD-10-CM

## 2024-07-26 DIAGNOSIS — M17.0 PRIMARY OSTEOARTHRITIS OF BOTH KNEES: ICD-10-CM

## 2024-07-26 DIAGNOSIS — Z12.5 PROSTATE CANCER SCREENING: ICD-10-CM

## 2024-07-26 DIAGNOSIS — I48.0 PAROXYSMAL ATRIAL FIBRILLATION (HCC): ICD-10-CM

## 2024-07-26 DIAGNOSIS — Z00.00 ENCOUNTER FOR ANNUAL HEALTH EXAMINATION: ICD-10-CM

## 2024-07-26 DIAGNOSIS — I77.811 ECTATIC ABDOMINAL AORTA (HCC): ICD-10-CM

## 2024-07-26 DIAGNOSIS — I25.10 ATHEROSCLEROSIS OF NATIVE CORONARY ARTERY OF NATIVE HEART WITHOUT ANGINA PECTORIS: ICD-10-CM

## 2024-07-26 DIAGNOSIS — Z00.00 ANNUAL PHYSICAL EXAM: Primary | ICD-10-CM

## 2024-07-26 DIAGNOSIS — G47.33 OBSTRUCTIVE SLEEP APNEA SYNDROME: ICD-10-CM

## 2024-07-26 LAB — COMPLEXED PSA SERPL-MCNC: 0.54 NG/ML (ref ?–4)

## 2024-07-26 PROCEDURE — 36415 COLL VENOUS BLD VENIPUNCTURE: CPT

## 2024-07-26 PROCEDURE — G0439 PPPS, SUBSEQ VISIT: HCPCS | Performed by: INTERNAL MEDICINE

## 2024-08-09 ENCOUNTER — OFFICE VISIT (OUTPATIENT)
Dept: HEMATOLOGY/ONCOLOGY | Age: 74
End: 2024-08-09
Attending: Other
Payer: MEDICARE

## 2024-08-09 VITALS
BODY MASS INDEX: 35 KG/M2 | RESPIRATION RATE: 18 BRPM | SYSTOLIC BLOOD PRESSURE: 123 MMHG | DIASTOLIC BLOOD PRESSURE: 58 MMHG | HEART RATE: 50 BPM | TEMPERATURE: 97 F | WEIGHT: 272 LBS | OXYGEN SATURATION: 97 %

## 2024-08-09 DIAGNOSIS — G61.89 POLYNEUROPATHY ASSOCIATED WITH MONOCLONAL IGM ANTIBODIES TO MYELIN-ASSOCIATED GLYCOPROTEIN (HCC): Primary | ICD-10-CM

## 2024-08-09 PROCEDURE — 96365 THER/PROPH/DIAG IV INF INIT: CPT

## 2024-08-09 PROCEDURE — 96366 THER/PROPH/DIAG IV INF ADDON: CPT

## 2024-08-09 RX ORDER — DIPHENHYDRAMINE HYDROCHLORIDE 50 MG/ML
25 INJECTION INTRAMUSCULAR; INTRAVENOUS ONCE
OUTPATIENT
Start: 2024-09-06

## 2024-08-09 RX ORDER — DIPHENHYDRAMINE HCL 25 MG
25 CAPSULE ORAL ONCE
Status: COMPLETED | OUTPATIENT
Start: 2024-08-09 | End: 2024-08-09

## 2024-08-09 RX ORDER — DIPHENHYDRAMINE HCL 25 MG
25 CAPSULE ORAL ONCE
OUTPATIENT
Start: 2024-09-06

## 2024-08-09 RX ORDER — ACETAMINOPHEN 325 MG/1
650 TABLET ORAL ONCE
OUTPATIENT
Start: 2024-09-06

## 2024-08-09 RX ORDER — ACETAMINOPHEN 325 MG/1
650 TABLET ORAL ONCE
Status: COMPLETED | OUTPATIENT
Start: 2024-08-09 | End: 2024-08-09

## 2024-08-09 RX ADMIN — DIPHENHYDRAMINE HCL 25 MG: 25 MG CAPSULE ORAL at 10:41:00

## 2024-08-09 RX ADMIN — ACETAMINOPHEN 650 MG: 325 TABLET ORAL at 10:41:00

## 2024-08-09 NOTE — PROGRESS NOTES
Education Record     Learner:  Patient     Disease / Diagnosis:      Barriers / Limitations:  none                Comments:     Method:  Discussion                Comments:     General Topics:  Plan of care reviewed                Comments:     Outcome:  Shows understanding                Comments:  Pt tolerated IVIG infusion. Dc home ambulatory in stable condition. Will get fu from [facility].

## 2024-08-14 ENCOUNTER — OFFICE VISIT (OUTPATIENT)
Dept: NEUROLOGY | Facility: CLINIC | Age: 74
End: 2024-08-14
Payer: MEDICARE

## 2024-08-14 VITALS
SYSTOLIC BLOOD PRESSURE: 124 MMHG | HEIGHT: 74 IN | DIASTOLIC BLOOD PRESSURE: 50 MMHG | WEIGHT: 272 LBS | BODY MASS INDEX: 34.91 KG/M2 | RESPIRATION RATE: 16 BRPM | HEART RATE: 68 BPM

## 2024-08-14 DIAGNOSIS — G62.9 NEUROPATHY: ICD-10-CM

## 2024-08-14 DIAGNOSIS — G61.89 POLYNEUROPATHY ASSOCIATED WITH MONOCLONAL IGM ANTIBODIES TO MYELIN-ASSOCIATED GLYCOPROTEIN (HCC): Primary | ICD-10-CM

## 2024-08-14 PROCEDURE — 99214 OFFICE O/P EST MOD 30 MIN: CPT | Performed by: OTHER

## 2024-08-14 RX ORDER — CETIRIZINE HYDROCHLORIDE 10 MG/1
10 TABLET ORAL DAILY
COMMUNITY

## 2024-08-14 RX ORDER — MELOXICAM 15 MG/1
15 TABLET ORAL AS NEEDED
COMMUNITY

## 2024-08-14 RX ORDER — CELECOXIB 200 MG/1
200 CAPSULE ORAL AS NEEDED
COMMUNITY

## 2024-08-14 NOTE — PROGRESS NOTES
[facility] Progress Note    HPI    Chief Complaint   Patient presents with    Neuropathy     4 month follow up and doing well with gabapentin and occasionally gets pain and walks and pain is gone       As per my initial H&P from 8/24/2023,   \" Wil Llanos is a 73 year old, who presents for evaluation of multiple complaints.  He started to have cramping in both legs in the past 6 months.  He also had numbness in toes 3 yrs ago.  He states he intermittently is having \"locking up\" of hands with thumb moving inward on R or L side - states this has been in the past few weeks.     He was recommended to have NCS/EMG of both arms.  He has some neck pain but not into the hands but sometimes has dropping of objects from hands.     He also has intermittent \"blackness\" in R eye for 5 minutes with no associated n/v, or headache or focal numbness/tingling/weakness or loss of awareness.      He has noted some pain in the legs below the knee and toe with \"curl upward\" at times. .       He has had EMG both arms 35 yrs ago and noted he was weak in left arm / hand on exam     Otherwise, patient denies any recent weight change, fevers, chills, nausea, double vision/ blurry vision / loss of vision, chest pain, palpitations, shortness of breath, rashes, joint pains, bowel / bladder incontinence or mood issues. \"        Prior notes as per 10/24/2023.  Patient since last visit has continued to have locking up of hands with thumb moveing inward on R or L side.  He is on gabapentin 600 mg AM / 300 mg PM / 600 mg nightly now with some improvement in pain at night; he notes improvement in curling toes - he feels he is \"wired, like he drank a lot of coffee\" since he has been on higher dose of gabapentin but otherwise has been tolerating; he feels he may be having breakthrough symptoms and tolerance         He admits he has had some tripping and falls on uneven ground but denies worsening.           Prior notes as per  12/12/2023.  Patient last seen 10/24/2023.  He is now on gabapentin at 600 mg tid - notes some cramping in both legs around the foot, toes curling at night and cramps in the back of the calf at night - notes he has cramping in legs when waking up at night - denies excessive sedation on gabapentin; has some swelling in ankles occasionally; he denies falls recently.  He also has locking up of hands intermittently.       Prior notes as per 4/12/2024.  Patient last seen 12/12/2023. He has had one session IVIG x5 days 2/28 to 3/1. He has been having cramping in legs waking him up at night and has locking up of hands as well; he had headache after IVIG but this persisted several days after infusion was done; no recent falls and now on gabapentin up to 900 mg tid.       Patient last seen 4/12/2024.  He has been having IVIG monthly infusions and has noted improvement in headaches he was having previously after IVIG infusion and also has improvement in burning sensation in both legs. He has noted improvement in locking up hands; he remains on gabapentin 900 mg tid and mainly has rare cramping in legs at night.     Past Medical History:    AAA (abdominal aortic aneurysm) (HCC)    Allergic rhinitis    Anesthesia complication    Wakes up violent    Arrhythmia    afib    Cataract    Complex sleep apnea syndrome    Diverticulosis of large intestine    Essential hypertension    High blood pressure    Osteoarthritis    Polyneuropathy    Sleep apnea    Umbilical hernia without obstruction or gangrene     Past Surgical History:   Procedure Laterality Date    Arthroscopy of joint unlisted Right     Meniscectomy    Other      Nasal surgery    Other      Hemorrhoidectomy and fistula repair    Tonsillectomy      Total hip replacement Left      Family History   Problem Relation Age of Onset    Heart Disorder Father     Other (Other) Mother         Alzheimer's     Social History     Socioeconomic History    Marital status:     Tobacco Use    Smoking status: Former     Current packs/day: 0.00     Average packs/day: 1 pack/day for 20.0 years (20.0 ttl pk-yrs)     Types: Cigarettes     Start date: 1981     Quit date: 2001     Years since quittin.0     Passive exposure: Never    Smokeless tobacco: Never    Tobacco comments:     Quit 20+ years ago per pt   Vaping Use    Vaping status: Never Used   Substance and Sexual Activity    Alcohol use: Yes     Comment: OCC    Drug use: Never   Other Topics Concern    Caffeine Concern Yes     Comment: 1 cup a day    Exercise No       Allergies   Allergen Reactions    Amlodipine SWELLING    Penicillins SWELLING    Rivaroxaban HIVES and ITCHING     Hives on legs      Vancomycin HALLUCINATION, ITCHING, OTHER (SEE COMMENTS), RASH and UNKNOWN     Elevated temperature    Hydralazine ITCHING     Red tablet pt is allergic to, takes orange tab is okay no problems      Losartan FACE FLUSHING    Ace Inhibitors Coughing         Current Outpatient Medications:     celecoxib 200 MG Oral Cap, Take 1 capsule (200 mg total) by mouth as needed for Pain., Disp: , Rfl:     Meloxicam 15 MG Oral Tab, Take 1 tablet (15 mg total) by mouth as needed for Pain., Disp: , Rfl:     cetirizine 10 MG Oral Tab, Take 1 tablet (10 mg total) by mouth daily., Disp: , Rfl:     atorvastatin 40 MG Oral Tab, Take 1 tablet (40 mg total) by mouth nightly., Disp: 90 tablet, Rfl: 0    gabapentin 300 MG Oral Cap, TAKE THREE CAPSULES BY MOUTH THREE TIMES DAILY, Disp: 810 capsule, Rfl: 1    Coenzyme Q10-Vitamin E (QUNOL ULTRA COQ10 OR), Take 100 mg by mouth daily., Disp: , Rfl:     hydrALAZINE 50 MG Oral Tab, Take 1 tablet (50 mg total) by mouth every morning. Take with 100mg in AM, Disp: , Rfl:     labetalol 200 MG Oral Tab, Take 1 tablet (200 mg total) by mouth 2 (two) times daily., Disp: , Rfl:     hydrALAZINE 100 MG Oral Tab, Take 1 tablet (100 mg total) by mouth 3 (three) times daily., Disp: , Rfl:     Ciclopirox (CICLODAN) 8 %  External Solution, Apply topically nightly., Disp: , Rfl:     torsemide 20 MG Oral Tab, Take 1 tablet (20 mg total) by mouth in the morning and 1 tablet (20 mg total) before bedtime., Disp: , Rfl:     spironolactone 25 MG Oral Tab, Take 1 tablet (25 mg total) by mouth daily., Disp: , Rfl:     Fluocinolone Acetonide 0.01 % Otic Oil, INSTILL 5 DROPS INTO BOTH EARS TWICE DAILY AS NEEDED, Disp: , Rfl:     apixaban 5 MG Oral Tab, Take by mouth 2 (two) times daily., Disp: , Rfl:     Review of Systems:  No chest pain or palpitations; otherwise as noted in HPI.    Exam:  /50 (BP Location: Left arm, Patient Position: Sitting, Cuff Size: large)   Pulse 68   Resp 16   Ht 74\"   Wt 272 lb (123.4 kg)   BMI 34.92 kg/m²   Estimated body mass index is 34.92 kg/m² as calculated from the following:    Height as of this encounter: 74\".    Weight as of this encounter: 272 lb (123.4 kg).    Gen: well developed, well nourished, no acute distress  HEENT: normocephalic  Heart; normal S1/S2, regular rate and rhythm  Lungs: clear to auscultation bilaterally  Extremities: no edema, peripheral pulses intact    Neck: supple, full range of motion; no carotid bruits    Fundoscopic Exam: optic discs sharp bilaterally    Neuro:  Mental status:  Orientation: Alert and oriented to person, place, time  Speech Fluent and conversational    CN: PERRL, EOMI with no nystagmus, VFF, smile symmetric, sensation intact, tongue and palate midline, SCM intact, otherwise, CN 2-12 intact  Motor:  5/5 throughout today  DTR:  2+ in UE and patellar; toes downgoing; no clonus; trace ankle jerk on R and absent on L    Sensory: Pin is intact in LE today ; intact in UE  Vibration is  absent left great toe but present at malleolus ~8 sec; present R great toe today for ~ 5 sec; 6 L thumb in UE; 5 on R   Proprioception is normal  Coord: FNF intact with no tremor or dysmetria; rapid alternating movements intact bilaterally  Romberg: absent  Gait: Casual gait  antalgic, mildly broad based but briefly able to walk on heels and toes; cannot tandem at all; no cane or walker needed     Labs:    None new    Prior as noted below    Component      Latest Ref Rng 10/26/2023   PROTEIN, TOTAL      5.7 - 8.2 g/dL 7.2    Albumin      3.8 - 4.8 g/dL 4.62    ALPHA-1-GLOBULINS      0.19 - 0.46 g/dL 0.22    ALPHA-2-GLOBULINS      0.48 - 1.05 g/dL 0.76    BETA GLOBULINS      0.68 - 1.23 g/dL 0.68    GAMMA GLOBULINS      0.62 - 1.70 g/dL 0.92    ALBUMIN/GLOBULIN RATIO      1.00 - 2.00  1.79    SPE INTERPRETATION No apparent monoclonal protein on serum electrophoresis.    IMMUNOFIXATION No monoclonal protein detected by immunofixation.…    KAPPA FREE LIGHT CHAIN      0.330 - 1.940 mg/dL 0.776    LAMBDA FREE LIGHT CHAIN      0.571 - 2.630 mg/dL 0.445 (L)    KAPPA/LAMBDA FLC RATIO      0.26 - 1.65  1.74 (H)    Case Report    GENERAL CATEGORIZATION:             FINAL DIAGNOSIS    Procedure    CLINICAL INFORMATION    Non Gyne Interpretation             Gross Description    WBC Calculated, CSF      /CUMM    Neutrophils CSF      0 - 6 %    Lymphocytes CSF      40 - 80 %    Mono/Macrophages CSF      15 - 45 %    EOSINOPHILS CSF      %    Basophil CSF      %    TOTAL CELLS COUNTED    GM1 Antibody, IgG      0 - 50 IV 3    GM1 Antibody, IgM      0 - 50 IV 3    GD1b Antibody, IgG      0 - 50 IV 4    GD1b Antibody, IgM      0 - 50 IV 3    GQ1b Antibody, IgG      0 - 50 IV 8    GQ1b Antibody, IgM      0 - 50 IV 3    TOTAL VOLUME CSF      mL    CSF TUBE #    Color CSF      Colorless     Clarity CSF      Clear     TNC, CSF      0 - 5 /CUMM    RBC CSF      <1 /CUMM    IMMUNOGLOBULIN G CSF      0.0 - 10.3 mg/dL    Albumin, CSF      15 - 55 mg/dL    IMMUNOGLOBULIN G      791 - 1,643 mg/dL 1,010    CSF IGG/ALBUMIN RATIO      0.00 - 0.25     IGG INDEX      0.0 - 0.7     MAG IgM Autoantibodies      0 - 999 BTU 1133 (H)    ACE, CSF      0.0 - 3.1 U/L    Glucose CSF      40 - 70 mg/dL    Total Protein CSF       15.0 - 45.0 mg/dL      Component      Latest Ref Rn 11/2/2023   PROTEIN, TOTAL      5.7 - 8.2 g/dL    Albumin      3.8 - 4.8 g/dL 4.1    ALPHA-1-GLOBULINS      0.19 - 0.46 g/dL    ALPHA-2-GLOBULINS      0.48 - 1.05 g/dL    BETA GLOBULINS      0.68 - 1.23 g/dL    GAMMA GLOBULINS      0.62 - 1.70 g/dL    ALBUMIN/GLOBULIN RATIO      1.00 - 2.00     SPE INTERPRETATION    IMMUNOFIXATION    KAPPA FREE LIGHT CHAIN      0.330 - 1.940 mg/dL    LAMBDA FREE LIGHT CHAIN      0.571 - 2.630 mg/dL    KAPPA/LAMBDA FLC RATIO      0.26 - 1.65     Case Report Non-Gynecologic Cytology Case: [id number] …    GENERAL CATEGORIZATION:          Benign, inflammatory, reactive or reparative changes    FINAL DIAGNOSIS This result contains rich text formatting which cannot be displayed here.    Procedure This result contains rich text formatting which cannot be displayed here.    CLINICAL INFORMATION This result contains rich text formatting which cannot be displayed here.    Non Gyne Interpretation          Benign    Gross Description This result contains rich text formatting which cannot be displayed here.    WBC Calculated, CSF      /CUMM 4    Neutrophils CSF      0 - 6 % 0    Lymphocytes CSF      40 - 80 % 65    Mono/Macrophages CSF      15 - 45 % 35    EOSINOPHILS CSF      % 0    Basophil CSF      % 0    TOTAL CELLS COUNTED 100    GM1 Antibody, IgG      0 - 50 IV    GM1 Antibody, IgM      0 - 50 IV    GD1b Antibody, IgG      0 - 50 IV    GD1b Antibody, IgM      0 - 50 IV    GQ1b Antibody, IgG      0 - 50 IV    GQ1b Antibody, IgM      0 - 50 IV    TOTAL VOLUME CSF      mL 19.0    CSF TUBE # 3    Color CSF      Colorless  Colorless    Clarity CSF      Clear  Clear    TNC, CSF      0 - 5 /CUMM 4    RBC CSF      <1 /CUMM 11 (H)    IMMUNOGLOBULIN G CSF      0.0 - 10.3 mg/dL 4.1    Albumin, CSF      15 - 55 mg/dL 38    IMMUNOGLOBULIN G      791 - 1,643 mg/dL 929    CSF IGG/ALBUMIN RATIO      0.00 - 0.25  0.11    IGG INDEX      0.0 - 0.7  0.5     MAG IgM Autoantibodies      0 - 999 BTU    ACE, CSF      0.0 - 3.1 U/L <1.5    Glucose CSF      40 - 70 mg/dL 58    Total Protein CSF      15.0 - 45.0 mg/dL 67.4 (H)      Component      Latest Ref Rn 11/7/2023   PROTEIN, TOTAL      5.7 - 8.2 g/dL    Albumin      3.8 - 4.8 g/dL    ALPHA-1-GLOBULINS      0.19 - 0.46 g/dL    ALPHA-2-GLOBULINS      0.48 - 1.05 g/dL    BETA GLOBULINS      0.68 - 1.23 g/dL    GAMMA GLOBULINS      0.62 - 1.70 g/dL    ALBUMIN/GLOBULIN RATIO      1.00 - 2.00     SPE INTERPRETATION    IMMUNOFIXATION    KAPPA FREE LIGHT CHAIN      0.330 - 1.940 mg/dL    LAMBDA FREE LIGHT CHAIN      0.571 - 2.630 mg/dL    KAPPA/LAMBDA FLC RATIO      0.26 - 1.65     Case Report    GENERAL CATEGORIZATION:             FINAL DIAGNOSIS    Procedure    CLINICAL INFORMATION    Non Gyne Interpretation             Gross Description    WBC Calculated, CSF      /CUMM    Neutrophils CSF      0 - 6 %    Lymphocytes CSF      40 - 80 %    Mono/Macrophages CSF      15 - 45 %    EOSINOPHILS CSF      %    Basophil CSF      %    TOTAL CELLS COUNTED    GM1 Antibody, IgG      0 - 50 IV    GM1 Antibody, IgM      0 - 50 IV    GD1b Antibody, IgG      0 - 50 IV    GD1b Antibody, IgM      0 - 50 IV    GQ1b Antibody, IgG      0 - 50 IV    GQ1b Antibody, IgM      0 - 50 IV    TOTAL VOLUME CSF      mL    CSF TUBE #    Color CSF      Colorless     Clarity CSF      Clear     TNC, CSF      0 - 5 /CUMM    RBC CSF      <1 /CUMM    IMMUNOGLOBULIN G CSF      0.0 - 10.3 mg/dL    Albumin, CSF      15 - 55 mg/dL    IMMUNOGLOBULIN G      791 - 1,643 mg/dL 1,020    CSF IGG/ALBUMIN RATIO      0.00 - 0.25     IGG INDEX      0.0 - 0.7     MAG IgM Autoantibodies      0 - 999 BTU    ACE, CSF      0.0 - 3.1 U/L    Glucose CSF      40 - 70 mg/dL    Total Protein CSF      15.0 - 45.0 mg/dL       Legend:  (L) Low  (H) High    Component      Latest Ref Rng 9/5/2023   PROTEIN, TOTAL      6.4 - 8.2 g/dL 7.0    Albumin      3.75 - 5.21 g/dL 4.31     ALPHA-1-GLOBULINS      0.19 - 0.46 g/dL 0.23    ALPHA-2-GLOBULINS      0.48 - 1.05 g/dL 0.76    BETA GLOBULINS      0.68 - 1.23 g/dL 0.71    GAMMA GLOBULINS      0.62 - 1.70 g/dL 0.99    ALBUMIN/GLOBULIN RATIO      1.00 - 2.00  1.60    SPE INTERPRETATION No apparent monoclonal protein on serum electrophoresis.…    IMMUNOFIXATION No monoclonal protein detected by immunofixation.…    KAPPA FREE LIGHT CHAIN      0.330 - 1.940 mg/dL 2.037 (H)    LAMBDA FREE LIGHT CHAIN      0.571 - 2.630 mg/dL 1.358    KAPPA/LAMBDA FLC RATIO      0.26 - 1.65  1.50    Expanded STEVIE Antibody Screen, IGG      <0.7 ug/l 0.20    Anti-dsDNA antibody      <10 IU/mL <0.6    Connective Tissue Disease Screen Interpretation      Negative  Negative    Vitamin B12      193 - 986 pg/mL 352    C-REACTIVE PROTEIN      <0.30 mg/dL <0.29    SED RATE      0 - 20 mm/Hr 11    RHEUMATOID FACTOR      <15 IU/mL <10       Legend:  (H) High    Imaging:  None new    Prior as noted below    MRI SPINE CERVICAL (CPT=72141)    Result Date: 11/28/2023        FINDINGS:    CERVICAL DISC LEVELS:   C2-C3:  No significant disc/facet abnormality, spinal stenosis, or foraminal narrowing.   C3-C4:  There is mild disc desiccation.  There is a prominent right sided disc/osteophyte complex with associated bilateral extensive facet arthropathy and facet hypertrophy.  There is associated severe right neural foraminal stenosis and slightly less   pronounced moderate to severe left-sided neural foraminal stenosis.  No significant central canal stenosis.   C4-C5:  There is moderate disc desiccation with disc height loss and broad-based disc bulge.  There are bilateral uncovertebral joint disc/osteophyte complexes.  There is moderate bilateral facet arthropathy with hypertrophy.  There is mild central canal    stenosis and moderate to severe bilateral neural foraminal stenosis, greater on the right.   C5-C6:  There is moderate to extensive disc desiccation with disc height loss and  broad-based disc bulge.  Bilateral uncovertebral joint disc/osteophyte complex is noted.  Moderate bilateral posterior articular facet joint arthropathy.  There is mild   central canal stenosis and moderate to severe bilateral neural foraminal stenosis.   C6-C7:  There is moderate to extensive disc desiccation with disc height loss and broad-based disc bulge.  There are prominent bilateral uncovertebral joint disc/osteophyte complexes.  There is moderate bilateral facet arthropathy with hypertrophy.    There is secondary mild central canal stenosis and moderate to severe bilateral neural foraminal stenosis.   C7-T1:  There is moderate disc desiccation with disc height loss and broad-based disc bulge.  There is moderate bilateral facet arthropathy.  No significant central canal stenosis is noted.  There is mild to moderate bilateral neural foraminal stenosis,   greater on the left.      CRANIOCERVICAL AREA:  Normal foramen magnum with no Chiari malformation.    PARASPINAL AREA:  Normal with no visible mass.    BONY STRUCTURES:  There is no evidence of acute osseous injuries.  There is multilevel posterior facet arthropathy and uncovertebral joint arthropathy of the cervical spine as described above.  No lytic, blastic or destructive osseous changes.   CORD:  Normal caliber, contour, and signal intensity.       CONCLUSION:  1. No acute process. 2. Significant multilevel disc disease and facet arthropathy with multilevel severe neural foraminal stenosis and multilevel mild central canal stenosis as described above. 3. Normal appearance of the cervical spinal cord and visualized hind brain structures.   LOCATION:  [facility]   Dictated by (CST): Alberta Mercado DO on 11/28/2023 at 8:58 AM     Finalized by (GAVIN): Alberta Mercado DO on 11/28/2023 at 9:04 AM          Other procedures  None new    Prior as noted below    NCS/EMG (8/29/2023):      Sensory NCS      Nerve / Sites Rec. Site Onset Lat Peak Lat NP Amp PP Amp Segments  Distance Peak Diff Velocity Comment       ms ms µV µV   cm ms m/s     R Median - Dig II (Antidromic)      Wrist Index 3.33 4.22 9.3 21.2 Wrist - Index 15   45     R Ulnar - Dig V (Antidromic)      Wrist Dig V 2.71 3.59 9.9 8.2 Wrist - Dig V 13   48        2 Dig V 2.76 3.54 8.7 7.6             L Ulnar - Dig V (Antidromic)      Wrist Dig V 2.81 3.65 11.9 8.5 Wrist - Dig V 13   46     R Radial - Superficial (Antidromic)      Forearm Wrist 1.93 2.50 22.6 8.9 Forearm - Wrist 10   52     L Radial - Superficial (Antidromic)      Forearm Wrist 1.88 2.66 19.4 4.3 Forearm - Wrist 10   53     L Median - Dig III (Antidromic)      Wrist Index 3.07 3.96 9.0 19.1 Wrist - Index 15   49        Palm Index 3.02 3.91 8.4 19.8 Palm - Index 7   23     R Median, Ulnar - Transcarpal comparison      Median Palm Wrist 2.03 2.60 16.5 23.4 Median Palm - Wrist 8   39        Ulnar Palm Wrist 1.41 1.93 9.8 8.4 Ulnar Palm - Wrist 8   57                 Median Palm - Ulnar Palm   0.68       L Median, Ulnar - Transcarpal comparison      Median Palm Wrist 1.56 2.19 26.7 40.0 Median Palm - Wrist 8   51        Ulnar Palm Wrist 1.61 2.08 9.3 14.8 Ulnar Palm - Wrist 8   50                 Median Palm - Ulnar Palm   0.10           Motor NCS      Nerve / Sites Muscle Latency Amplitude Segments Dist. Lat Diff Velocity Comments       ms mV   cm ms m/s     R Median - APB      Wrist APB 4.23 6.0 Wrist - APB 7            Elbow APB 9.96 5.5 Elbow - Wrist 27.5 5.73 48.0     L Median - APB      Wrist APB 3.56 8.3 Wrist - APB 7            Elbow APB 8.81 8.0 Elbow - Wrist 25.5 5.25 48.6     R Ulnar - ADM      Wrist ADM 2.85 9.4 Wrist - ADM 7            B.Elbow ADM 7.75 8.7 B.Elbow - Wrist 24.5 4.90 50.0     L Ulnar - ADM      Wrist ADM 2.79 9.7 Wrist - ADM 7            B.Elbow ADM 7.67 9.0 B.Elbow - Wrist 25.5 4.88 52.3         F  Wave      Nerve M Latency F Latency F-M Lat     ms ms ms   R Median - APB 4.6 35.1 30.4   R Ulnar - ADM 3.1 36.4 33.3   L Median - APB  3.8 33.7 29.9   L Ulnar - ADM 3.3 36.0 32.7                   EMG Summary Table       Spontaneous MUAP Recruitment   Muscle IA Fib PSW Fasc H.F. Amp Dur. PPP Pattern   L. Deltoid N None None None None N N N N   L. Biceps brachii N None None None None N N N N   L. Triceps brachii N None None None None N N N N   L. Extensor digitorum communis N None None None None N N N N   L. First dorsal interosseous N None None None None N N N N   L. Abductor pollicis brevis N None None None None N N N N   R. Deltoid N None None None None N N N N   R. Biceps brachii N None None None None N N N N   R. Triceps brachii N None None None None N N N N   R. Extensor digitorum communis N None None None None N N N N   R. First dorsal interosseous N None None None None N N N N   R. Abductor pollicis brevis N None None None None N N N N            Findings:      Nerve conduction studies:   Right median motor response was borderline due to borderline conduction velocity with normal distal motor latency and amplitude; F wave response was prolonged.   Left median motor response was borderline due to borderline conduction velocity with normal distal motor latency and amplitude; F wave response was prolonged.   Right ulnar motor response was normal; F wave response was prolonged.  Left ulnar motor response was normal; F wave response was prolonged.   Right median sensory response was abnormal due to prolonged peak latency with reduced amplitude and slowed conduction velocity.   Left median sensory response was abnormal due to prolonged peak latency with reduced amplitude and slowed conduction velocity.   Right ulnar sensory response was borderline due to borderline prolonged peak latency, with borderline amplitude and borderline conduction velocity.   Left ulnar sensory response was borderline due to borderline prolonged peak latency, with borderline amplitude and borderline conduction velocity.   Right radial sensory response was normal.   Left  radial sensory response was normal.   Right median to ulnar palmar mixed nerve comparison study was abnormal due to significant prolongation of median relative to ulnar nerve greater than 0.4 msec (~0.68 msec), consistent with focal slowing across the wrist.   Left median to ulnar palmar mixed nerve comparison study was normal.      EMG (needle exam):   Concentric needle EMG was performed on the selected muscles listed above in the table with the following findings: all muscles tested were normal.      Impression:   This is an abnormal study. 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  This is demyelinating type with sensory involvement.  In addition, there is suggestion for mild sensory neuropathy, which is primary axonal.  There is no suggestion for a primary demyelinating neuropathy or myopathy.       Prior as noted below:     NCS /EMG (8/9/2023):     Sensory NCS      Nerve / Sites Rec. Site Onset Lat Peak Lat NP Amp PP Amp Segments Distance Velocity Comment       ms ms µV µV   cm m/s     R Sural - (Antidromic)      Calf Ankle 3.18 4.11 4.6 6.0 Calf - Ankle 14 44        2 Ankle                   L Sural - (Antidromic)      Calf Ankle NR NR NR NR Calf - Ankle 14 NR         Motor NCS      Nerve / Sites Muscle Latency Amplitude Segments Dist. Lat Diff Velocity Comments       ms mV   cm ms m/s     R Peroneal - EDB      Ankle EDB 5.73 1.3 Ankle - EDB 8            B. Fib Head EDB 15.58 1.2 B. Fib Head - Ankle 35 9.85 35.5     L Peroneal - EDB      Ankle EDB 5.71 1.2 Ankle - EDB 8            B. Fib Head EDB 15.67 1.0 B. Fib Head - Ankle 35 9.96 35.1     R Tibial - AH      Ankle AH 5.08 0.9 Ankle - AH 8            Knee AH 17.33 0.5 Knee - Ankle 44 12.25 35.9     L Tibial - AH      Ankle AH 5.56 1.0 Ankle - AH 8            Knee AH 17.10 0.6 Knee - Ankle 43.5 11.54 37.7         F  Wave      Nerve M Latency F Latency F-M Lat     ms ms ms   R Peroneal - EDB 6.4  51.6 45.2   R Tibial - AH 11.9 70.7 58.8   L Tibial - AH 6.2 71.4 65.2   L Peroneal - EDB 6.5 67.3 60.8                             EMG Summary Table       Spontaneous MUAP Recruitment   Muscle IA Fib PSW Fasc H.F. Amp Dur. PPP Pattern   R. Vastus medialis N None None None None N N N N   L. Vastus medialis N None None None None N N N N   R. Peroneus longus N None None None None N N N N   L. Peroneus longus N None None None None N N N N   R. Tibialis anterior N None None None None N N N N   L. Tibialis anterior N None None None None N N N N   R. Gastrocnemius N None None None None N N N N   L. Gastrocnemius N None None None None N N N N   R. Extensor hallucis longus N None None None None N N N N   L. Extensor hallucis longus N None None None None N N N N            Summary:      Nerve conduction studies:  Right sural sensory response was borderline due to borderline amplitude with normal peak latency and normal conduction velocity.  Left sural sensory response was absent.   Right common peroneal motor response was abnormal due to reduced amplitude, with normal distal motor latency, and mildly slowed conduction velocity; F wave response was normal.  Left common peroneal motor response was abnormal due to reduced amplitude, with normal distal motor latency, and mildly slowed conduction velocity; F-wave response was mildly prolonged.  Right tibial motor response was abnormal due to reduced amplitude, with normal distal motor latency, and mildly slowed conduction velocity; F-wave response was prolonged; in addition, there was suggestion for temporal dispersion on proximal stimulation.  Left tibial motor response was abnormal due to reduced amplitude, with normal distal motor latency, and mildly slowed conduction velocity; F-wave response was prolonged; in addition, there was suggestion for temporal dispersion on proximal stimulation.     EMG (needle exam):  Concentric needle EMG was performed on the selected muscles  listed above in the table, with the following findings: All muscles tested were normal     Impression:  This is an abnormal study.  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    There was no suggestion for myopathic units or acute denervation.  It should be noted that the testing was somewhat technically difficult due to patient's body habitus.      Clinical comment:   On the basis of this limited study, there are some findings of asymmetry, with worsening in the left lower extremity in terms of the sural sensory response.  In addition, there is some suggestion for temporal dispersion in the right tibial motor response on proximal stimulation as well as in the left tibial motor response. While not meeting criteria for chronic inflammatory demyelinating polyneuropathy (CIDP), or a primary demyelinating neuropathy, the presence of asymmetric features and some uniformly prolonged F waves raises this possibility; alternatively, this may be due to technical factors related to patient's body habitus.  Additional testing with EMG of the upper extremities may provide additional information regarding the etiology of this likely chronic neuropathy.          Impression/ Plan:  Wil Llanos is a 74 year old male who originally presented 8/24/2023 for evaluation of multiple complaints.  He started to have cramping in both legs in the past 6 months.  He also had numbness in toes 3 yrs ago.  He states he intermittently is having \"locking up\" of hands with thumb moving inward on R or L side - states this has been in the past few weeks.       Neurologic exam showed decreased sensation in both feet with depressed DTRs distally as well as reduced sensation in UE to vibration. In addition, there was some weakness in APB muscles in UE bilaterally. Signs/symptoms are overall concerning for neuropathy.  In addition, NCS/EMG done  of both legs showed some asymmetry with absent sural sensory response on left side and present but reduced on R side. Tibial motor response showed bilateral temporal dispersion proximally and this could suggest an atypical neuropathy or be due to artifact.      In order to further evaluate, NCS/EMG of both upper extremities was done and showed evidence for sensory neuropathy with axonal and demyelinating features along with median entrapment neuropathy across the right wrist, suggestive of carpal tunnel syndrome, which is mild.  Labs were done and showed mild elevation in kappa free light chain but normal ratio and borderline B12 level with normal ESR, CRP, RF.  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 Neuropathy  As noted above     Return in about 4 months (around 12/14/2024).    Mode Cox MD, Neurology  [facility]  Pager [phone]  8/14/2024

## 2024-08-14 NOTE — PATIENT INSTRUCTIONS
Refill policies:    Allow 2-3 business days for refills; controlled substances may take longer.  Contact your pharmacy at least 5 days prior to running out of medication and have them send an electronic request or submit request through the “request refill” option in your [employer] account.  Refills are not addressed on weekends; covering physicians do not authorize routine medications on weekends.  No narcotics or controlled substances are refilled after noon on Fridays or by on call physicians.  By law, narcotics must be electronically prescribed.  A 30 day supply with no refills is the maximum allowed.  If your prescription is due for a refill, you may be due for a follow up appointment.  To best provide you care, patients receiving routine medications need to be seen at least once a year.  Patients receiving narcotic/controlled substance medications need to be seen at least once every 3 months.  In the event that your preferred pharmacy does not have the requested medication in stock (e.g. Backordered), it is your responsibility to find another pharmacy that has the requested medication available.  We will gladly send a new prescription to that pharmacy at your request.    Scheduling Tests:    If your physician has ordered radiology tests such as MRI or CT scans, please contact Central Scheduling at [phone] right away to schedule the test.  Once scheduled, the [facility] Centralized Referral Team will work with your insurance carrier to obtain pre-certification or prior authorization.  Depending on your insurance carrier, approval may take 3-10 days.  It is highly recommended patients assure they have received an authorization before having a test performed.  If test is done without insurance authorization, patient may be responsible for the entire amount billed.      Precertification and Prior Authorizations:  If your physician has recommended that you have a procedure or additional testing performed the [facility]  Centralized Referral Team will contact your insurance carrier to obtain pre-certification or prior authorization.    You are strongly encouraged to contact your insurance carrier to verify that your procedure/test has been approved and is a COVERED benefit.  Although the [facility] Centralized Referral Team does its due diligence, the insurance carrier gives the disclaimer that \"Although the procedure is authorized, this does not guarantee payment.\"    Ultimately the patient is responsible for payment.   Thank you for your understanding in this matter.  Paperwork Completion:  If you require FMLA or disability paperwork for your recovery, please make sure to either drop it off or have it faxed to our office at [phone]. Be sure the form has your name and date of birth on it.  The form will be faxed to our Forms Department and they will complete it for you.  There is a 25$ fee for all forms that need to be filled out.  Please be aware there is a 10-14 day turnaround time.  You will need to sign a release of information (NAMITA) form if your paperwork does not come with one.  You may call the Forms Department with any questions at [phone].  Their fax number is [phone].

## 2024-09-06 ENCOUNTER — OFFICE VISIT (OUTPATIENT)
Dept: HEMATOLOGY/ONCOLOGY | Age: 74
End: 2024-09-06
Attending: Other
Payer: MEDICARE

## 2024-09-06 VITALS
WEIGHT: 274 LBS | RESPIRATION RATE: 16 BRPM | HEART RATE: 48 BPM | BODY MASS INDEX: 35 KG/M2 | OXYGEN SATURATION: 96 % | DIASTOLIC BLOOD PRESSURE: 58 MMHG | SYSTOLIC BLOOD PRESSURE: 132 MMHG | TEMPERATURE: 97 F

## 2024-09-06 DIAGNOSIS — G61.89 POLYNEUROPATHY ASSOCIATED WITH MONOCLONAL IGM ANTIBODIES TO MYELIN-ASSOCIATED GLYCOPROTEIN (HCC): Primary | ICD-10-CM

## 2024-09-06 PROCEDURE — 96366 THER/PROPH/DIAG IV INF ADDON: CPT

## 2024-09-06 PROCEDURE — 96365 THER/PROPH/DIAG IV INF INIT: CPT

## 2024-09-06 RX ORDER — DIPHENHYDRAMINE HCL 25 MG
25 CAPSULE ORAL ONCE
OUTPATIENT
Start: 2024-10-04

## 2024-09-06 RX ORDER — DIPHENHYDRAMINE HYDROCHLORIDE 50 MG/ML
25 INJECTION INTRAMUSCULAR; INTRAVENOUS ONCE
OUTPATIENT
Start: 2024-10-04

## 2024-09-06 RX ORDER — DIPHENHYDRAMINE HCL 25 MG
25 CAPSULE ORAL ONCE
Status: COMPLETED | OUTPATIENT
Start: 2024-09-06 | End: 2024-09-06

## 2024-09-06 RX ORDER — ACETAMINOPHEN 325 MG/1
650 TABLET ORAL ONCE
OUTPATIENT
Start: 2024-10-04

## 2024-09-06 RX ORDER — ACETAMINOPHEN 325 MG/1
650 TABLET ORAL ONCE
Status: COMPLETED | OUTPATIENT
Start: 2024-09-06 | End: 2024-09-06

## 2024-09-06 RX ADMIN — ACETAMINOPHEN 650 MG: 325 TABLET ORAL at 11:05:00

## 2024-09-06 RX ADMIN — DIPHENHYDRAMINE HCL 25 MG: 25 MG CAPSULE ORAL at 11:05:00

## 2024-09-06 NOTE — PROGRESS NOTES
Pt here for IVIG infusion. Pt denies any issues or concerns.      Ordering Provider: Kenny Machado Exp: 6/12 doses remaining     Pt tolerated infusion without difficulty or complaint. Pt discharged home in stable condition - will get follow up appts from [facility].       Education Record  Learner:  Patient  Disease / Diagnosis: IVIG infusion  Barriers / Limitations:  None  Method:  Discussion  General Topics:  Plan of care reviewed  Outcome:  Shows understanding

## 2024-09-14 DIAGNOSIS — G62.9 NEUROPATHY: ICD-10-CM

## 2024-09-14 RX ORDER — GABAPENTIN 300 MG/1
CAPSULE ORAL
Qty: 810 CAPSULE | Refills: 1 | Status: CANCELLED | OUTPATIENT
Start: 2024-09-14

## 2024-09-16 RX ORDER — GABAPENTIN 300 MG/1
CAPSULE ORAL
Qty: 810 CAPSULE | Refills: 0 | Status: SHIPPED | OUTPATIENT
Start: 2024-09-16 | End: 2024-12-19

## 2024-09-16 NOTE — TELEPHONE ENCOUNTER
Patient requested refill via [employer]. Per [facility] review, 90 day supply issued 6/2024 with 1 refill, so refill should be available through [employer]. Patient notified to contact [employer].

## 2024-09-16 NOTE — TELEPHONE ENCOUNTER
Patient reports no refills are remaining. Script pended for MD approval; patient should present for follow-up before additional script is needed (scheduled for 12/17) to determine if further adjustments may be required.

## 2024-09-18 DIAGNOSIS — G62.9 NEUROPATHY: ICD-10-CM

## 2024-09-19 RX ORDER — GABAPENTIN 300 MG/1
CAPSULE ORAL
Qty: 810 CAPSULE | Refills: 0 | OUTPATIENT
Start: 2024-09-19

## 2024-09-19 NOTE — TELEPHONE ENCOUNTER
Duplicate    gabapentin 300 MG Oral Cax383 [id number]/16/2024--Sig: TAKE THREE CAPSULES BY MOUTH THREE TIMES DAILYSent to pharmacy as: Gabapentin 300 MG Oral Capsule (Neurontin)E-Prescribing Status: Receipt confirmed by pharmacy (9/16/2024  1:03 PM CDT)

## 2024-10-04 ENCOUNTER — OFFICE VISIT (OUTPATIENT)
Dept: HEMATOLOGY/ONCOLOGY | Age: 74
End: 2024-10-04
Attending: Other
Payer: MEDICARE

## 2024-10-04 VITALS
OXYGEN SATURATION: 96 % | BODY MASS INDEX: 35 KG/M2 | RESPIRATION RATE: 16 BRPM | TEMPERATURE: 98 F | HEART RATE: 51 BPM | SYSTOLIC BLOOD PRESSURE: 139 MMHG | WEIGHT: 274 LBS | DIASTOLIC BLOOD PRESSURE: 55 MMHG

## 2024-10-04 DIAGNOSIS — G61.89 POLYNEUROPATHY ASSOCIATED WITH MONOCLONAL IGM ANTIBODIES TO MYELIN-ASSOCIATED GLYCOPROTEIN (HCC): Primary | ICD-10-CM

## 2024-10-04 PROCEDURE — 96365 THER/PROPH/DIAG IV INF INIT: CPT

## 2024-10-04 PROCEDURE — 96366 THER/PROPH/DIAG IV INF ADDON: CPT

## 2024-10-04 RX ORDER — DIPHENHYDRAMINE HCL 25 MG
25 CAPSULE ORAL ONCE
Status: COMPLETED | OUTPATIENT
Start: 2024-10-04 | End: 2024-10-04

## 2024-10-04 RX ORDER — DIPHENHYDRAMINE HCL 25 MG
25 CAPSULE ORAL ONCE
Status: CANCELLED | OUTPATIENT
Start: 2024-11-01

## 2024-10-04 RX ORDER — DIPHENHYDRAMINE HYDROCHLORIDE 50 MG/ML
25 INJECTION INTRAMUSCULAR; INTRAVENOUS ONCE
OUTPATIENT
Start: 2024-11-01

## 2024-10-04 RX ORDER — ACETAMINOPHEN 325 MG/1
650 TABLET ORAL ONCE
Status: COMPLETED | OUTPATIENT
Start: 2024-10-04 | End: 2024-10-04

## 2024-10-04 RX ORDER — ACETAMINOPHEN 325 MG/1
650 TABLET ORAL ONCE
Status: CANCELLED | OUTPATIENT
Start: 2024-11-01

## 2024-10-04 RX ADMIN — ACETAMINOPHEN 650 MG: 325 TABLET ORAL at 10:34:00

## 2024-10-04 RX ADMIN — DIPHENHYDRAMINE HCL 25 MG: 25 MG CAPSULE ORAL at 10:34:00

## 2024-10-04 NOTE — PROGRESS NOTES
Education Record    Learner:  Patient    Pt here for: ivig    Barriers / Limitations:  None    Method:  Brief focused, printed material and  reinforcement    General Topics:  Plan of care reviewed    Outcome: Pt tolerated infusion with no c/o.

## 2024-10-10 ENCOUNTER — OFFICE VISIT (OUTPATIENT)
Dept: FAMILY MEDICINE CLINIC | Facility: CLINIC | Age: 74
End: 2024-10-10
Payer: MEDICARE

## 2024-10-10 VITALS
DIASTOLIC BLOOD PRESSURE: 70 MMHG | WEIGHT: 271 LBS | HEART RATE: 49 BPM | TEMPERATURE: 98 F | RESPIRATION RATE: 16 BRPM | OXYGEN SATURATION: 98 % | BODY MASS INDEX: 34.78 KG/M2 | HEIGHT: 74 IN | SYSTOLIC BLOOD PRESSURE: 118 MMHG

## 2024-10-10 DIAGNOSIS — J06.9 VIRAL URI WITH COUGH: ICD-10-CM

## 2024-10-10 DIAGNOSIS — J01.00 ACUTE NON-RECURRENT MAXILLARY SINUSITIS: Primary | ICD-10-CM

## 2024-10-10 PROCEDURE — 99213 OFFICE O/P EST LOW 20 MIN: CPT | Performed by: NURSE PRACTITIONER

## 2024-10-10 RX ORDER — DOXYCYCLINE HYCLATE 100 MG
100 TABLET ORAL 2 TIMES DAILY
Qty: 14 TABLET | Refills: 0 | Status: SHIPPED | OUTPATIENT
Start: 2024-10-10 | End: 2024-10-17

## 2024-10-10 NOTE — PROGRESS NOTES
CHIEF COMPLAINT:     Chief Complaint   Patient presents with    Nasal Congestion     ST x Sat, nasal congestion, green discharge, PND causing cough  OTC Ricola, Mucinex, Claritin  Denies fever   Knee sx TBD       HPI:   Wil Llanos is a 74 year old male who presents for upper respiratory symptoms for  5 days. Patient reports sore throat, congestion, cough with greenish colored sputum. Symptoms have been slightly worse since onset.  Treating symptoms with OTC meds.   Associated symptoms include some fatigue.  Denies fevers, aches, chills.  Reports sore throat started, then congestion and PND causing cough. No wheezing/sob,  Reports blood tinged green drainage from sinuses. Reports started after attending [employer] game.     Current Outpatient Medications   Medication Sig Dispense Refill    Doxycycline Hyclate 100 MG Oral Tab Take 1 tablet (100 mg total) by mouth 2 (two) times daily for 7 days. 14 tablet 0    gabapentin 300 MG Oral Cap TAKE THREE CAPSULES BY MOUTH THREE TIMES DAILY 810 capsule 0    celecoxib 200 MG Oral Cap Take 1 capsule (200 mg total) by mouth as needed for Pain.      Meloxicam 15 MG Oral Tab Take 1 tablet (15 mg total) by mouth as needed for Pain.      cetirizine 10 MG Oral Tab Take 1 tablet (10 mg total) by mouth daily.      atorvastatin 40 MG Oral Tab Take 1 tablet (40 mg total) by mouth nightly. 90 tablet 0    Coenzyme Q10-Vitamin E (QUNOL ULTRA COQ10 OR) Take 100 mg by mouth daily.      hydrALAZINE 50 MG Oral Tab Take 1 tablet (50 mg total) by mouth every morning. Take with 100mg in AM      labetalol 200 MG Oral Tab Take 1 tablet (200 mg total) by mouth 2 (two) times daily.      hydrALAZINE 100 MG Oral Tab Take 1 tablet (100 mg total) by mouth 3 (three) times daily.      Ciclopirox (CICLODAN) 8 % External Solution Apply topically nightly.      torsemide 20 MG Oral Tab Take 1 tablet (20 mg total) by mouth in the morning and 1 tablet (20 mg total) before bedtime.      spironolactone  25 MG Oral Tab Take 1 tablet (25 mg total) by mouth daily.      Fluocinolone Acetonide 0.01 % Otic Oil INSTILL 5 DROPS INTO BOTH EARS TWICE DAILY AS NEEDED      apixaban 5 MG Oral Tab Take by mouth 2 (two) times daily.        Past Medical History:    AAA (abdominal aortic aneurysm) (HCC)    Allergic rhinitis    Anesthesia complication    Wakes up violent    Arrhythmia    afib    Cataract    Complex sleep apnea syndrome    Diverticulosis of large intestine    Essential hypertension    High blood pressure    Osteoarthritis    Polyneuropathy    Sleep apnea    Umbilical hernia without obstruction or gangrene      Past Surgical History:   Procedure Laterality Date    Arthroscopy of joint unlisted Right     Meniscectomy    Other      Nasal surgery    Other      Hemorrhoidectomy and fistula repair    Tonsillectomy      Total hip replacement Left          Social History     Socioeconomic History    Marital status:    Tobacco Use    Smoking status: Former     Current packs/day: 0.00     Average packs/day: 1 pack/day for 20.0 years (20.0 ttl pk-yrs)     Types: Cigarettes     Start date: 1981     Quit date: 2001     Years since quittin.2     Passive exposure: Never    Smokeless tobacco: Never    Tobacco comments:     Quit 20+ years ago per pt   Vaping Use    Vaping status: Never Used   Substance and Sexual Activity    Alcohol use: Yes     Comment: OCC    Drug use: Never   Other Topics Concern    Caffeine Concern Yes     Comment: 1 cup a day    Exercise No         REVIEW OF SYSTEMS:   GENERAL: feels well otherwise,   ok appetite  SKIN: no rashes or abnormal skin lesions  HEENT: See HPI  LUNGS: denies shortness of breath or wheezing, See HPI  CARDIOVASCULAR: denies chest pain or palpitations   GI: denies N/V/C or abdominal pain  NEURO: Denies headaches    EXAM:   /70   Pulse (!) 49   Temp 98 °F (36.7 °C)   Resp 16   Ht 6' 2\" (1.88 m)   Wt 271 lb (122.9 kg)   SpO2 98%   BMI 34.79 kg/m²    GENERAL: well developed, well nourished,in no apparent distress  SKIN: no rashes,no suspicious lesions  HEAD: atraumatic, normocephalic.  no tenderness on palpation of maxillary or frontal sinuses  EYES: conjunctiva clear, EOM intact  EARS: TM's pearly, no bulging, no retraction,no fluid, bony landmarks visualized  NOSE: Nostrils patent, some crusted nasal discharge, nasal mucosa pink and moist  THROAT: Oral mucosa pink, moist. Posterior pharynx is mildly erythematous. no exudates. Tonsils 1/4.    NECK: Supple, non-tender  LUNGS: clear to auscultation bilaterally, no wheezes or rhonchi.  No crackles/rales, good air movement throughout. Breathing is non labored.  CARDIO: RRR without murmur  EXTREMITIES: no cyanosis, clubbing or edema  LYMPH:  No cervical lymphadenopathy.        ASSESSMENT AND PLAN:   Wil Llanos is a 74 year old male who presents with     ASSESSMENT:   Encounter Diagnoses   Name Primary?    Acute non-recurrent maxillary sinusitis Yes    Viral URI with cough        PLAN: Meds as below.  Hold abx for now, if no improvement in 3-4 days, ok to take for sinus infection.  Likely viral URI still.   Mucinex to help thin secretions.    Follow up with PCP if no improvement in 3-5 days, sooner if worsening.  If any sob/wheezing seek emergent care.Comfort care as described in Patient Instructions    Meds & Refills for this Visit:  Requested Prescriptions     Signed Prescriptions Disp Refills    Doxycycline Hyclate 100 MG Oral Tab 14 tablet 0     Sig: Take 1 tablet (100 mg total) by mouth 2 (two) times daily for 7 days.       Risks, benefits, and side effects of medication explained and discussed.    There are no Patient Instructions on file for this visit.    The patient indicates understanding of these issues and agrees to the plan.  The patient is asked to return if sx's persist or worsen.

## 2024-10-11 ENCOUNTER — LAB ENCOUNTER (OUTPATIENT)
Dept: LAB | Age: 74
End: 2024-10-11
Payer: MEDICARE

## 2024-10-11 ENCOUNTER — EKG ENCOUNTER (OUTPATIENT)
Dept: LAB | Age: 74
End: 2024-10-11
Payer: MEDICARE

## 2024-10-11 DIAGNOSIS — Z01.812 PRE-OPERATIVE LABORATORY EXAMINATION: Primary | ICD-10-CM

## 2024-10-11 LAB
ALBUMIN SERPL-MCNC: 4.7 G/DL (ref 3.2–4.8)
ALBUMIN/GLOB SERPL: 1.5 {RATIO} (ref 1–2)
ALP LIVER SERPL-CCNC: 70 U/L
ALT SERPL-CCNC: 31 U/L
ANION GAP SERPL CALC-SCNC: 5 MMOL/L (ref 0–18)
AST SERPL-CCNC: 34 U/L (ref ?–34)
BASOPHILS # BLD AUTO: 0.04 X10(3) UL (ref 0–0.2)
BASOPHILS NFR BLD AUTO: 0.6 %
BILIRUB SERPL-MCNC: 0.6 MG/DL (ref 0.2–1.1)
BILIRUB UR QL STRIP.AUTO: NEGATIVE
BUN BLD-MCNC: 26 MG/DL (ref 9–23)
CALCIUM BLD-MCNC: 9.5 MG/DL (ref 8.7–10.4)
CHLORIDE SERPL-SCNC: 103 MMOL/L (ref 98–112)
CLARITY UR REFRACT.AUTO: CLEAR
CO2 SERPL-SCNC: 28 MMOL/L (ref 21–32)
COLOR UR AUTO: COLORLESS
CREAT BLD-MCNC: 1.33 MG/DL
EGFRCR SERPLBLD CKD-EPI 2021: 57 ML/MIN/1.73M2 (ref 60–?)
EOSINOPHIL # BLD AUTO: 0.13 X10(3) UL (ref 0–0.7)
EOSINOPHIL NFR BLD AUTO: 2 %
ERYTHROCYTE [DISTWIDTH] IN BLOOD BY AUTOMATED COUNT: 12.9 %
FASTING STATUS PATIENT QL REPORTED: YES
GLOBULIN PLAS-MCNC: 3.2 G/DL (ref 2–3.5)
GLUCOSE BLD-MCNC: 94 MG/DL (ref 70–99)
GLUCOSE UR STRIP.AUTO-MCNC: NORMAL MG/DL
HCT VFR BLD AUTO: 44.3 %
HGB BLD-MCNC: 14.9 G/DL
IMM GRANULOCYTES # BLD AUTO: 0.01 X10(3) UL (ref 0–1)
IMM GRANULOCYTES NFR BLD: 0.2 %
INR BLD: 1.12 (ref 0.8–1.2)
KETONES UR STRIP.AUTO-MCNC: NEGATIVE MG/DL
LEUKOCYTE ESTERASE UR QL STRIP.AUTO: NEGATIVE
LYMPHOCYTES # BLD AUTO: 1.65 X10(3) UL (ref 1–4)
LYMPHOCYTES NFR BLD AUTO: 25.9 %
MCH RBC QN AUTO: 30.3 PG (ref 26–34)
MCHC RBC AUTO-ENTMCNC: 33.6 G/DL (ref 31–37)
MCV RBC AUTO: 90.2 FL
MONOCYTES # BLD AUTO: 0.91 X10(3) UL (ref 0.1–1)
MONOCYTES NFR BLD AUTO: 14.3 %
NEUTROPHILS # BLD AUTO: 3.62 X10 (3) UL (ref 1.5–7.7)
NEUTROPHILS # BLD AUTO: 3.62 X10(3) UL (ref 1.5–7.7)
NEUTROPHILS NFR BLD AUTO: 57 %
NITRITE UR QL STRIP.AUTO: NEGATIVE
OSMOLALITY SERPL CALC.SUM OF ELEC: 287 MOSM/KG (ref 275–295)
PH UR STRIP.AUTO: 6.5 [PH] (ref 5–8)
PLATELET # BLD AUTO: 164 10(3)UL (ref 150–450)
POTASSIUM SERPL-SCNC: 3.8 MMOL/L (ref 3.5–5.1)
PROT SERPL-MCNC: 7.9 G/DL (ref 5.7–8.2)
PROT UR STRIP.AUTO-MCNC: NEGATIVE MG/DL
PROTHROMBIN TIME: 14.2 SECONDS (ref 11.6–14.8)
RBC # BLD AUTO: 4.91 X10(6)UL
RBC UR QL AUTO: NEGATIVE
SODIUM SERPL-SCNC: 136 MMOL/L (ref 136–145)
SP GR UR STRIP.AUTO: 1.01 (ref 1–1.03)
UROBILINOGEN UR STRIP.AUTO-MCNC: NORMAL MG/DL
WBC # BLD AUTO: 6.4 X10(3) UL (ref 4–11)

## 2024-10-11 PROCEDURE — 87081 CULTURE SCREEN ONLY: CPT

## 2024-10-11 PROCEDURE — 36415 COLL VENOUS BLD VENIPUNCTURE: CPT

## 2024-10-11 PROCEDURE — 80053 COMPREHEN METABOLIC PANEL: CPT

## 2024-10-11 PROCEDURE — 85610 PROTHROMBIN TIME: CPT

## 2024-10-11 PROCEDURE — 85025 COMPLETE CBC W/AUTO DIFF WBC: CPT

## 2024-10-11 PROCEDURE — 93010 ELECTROCARDIOGRAM REPORT: CPT | Performed by: INTERNAL MEDICINE

## 2024-10-11 PROCEDURE — 93005 ELECTROCARDIOGRAM TRACING: CPT

## 2024-10-11 PROCEDURE — 81003 URINALYSIS AUTO W/O SCOPE: CPT

## 2024-10-12 LAB
ATRIAL RATE: 43 BPM
P AXIS: 50 DEGREES
P-R INTERVAL: 170 MS
Q-T INTERVAL: 520 MS
QRS DURATION: 120 MS
QTC CALCULATION (BEZET): 439 MS
R AXIS: -24 DEGREES
T AXIS: 51 DEGREES
VENTRICULAR RATE: 43 BPM

## 2024-10-13 DIAGNOSIS — E78.2 MIXED HYPERLIPIDEMIA: ICD-10-CM

## 2024-10-13 DIAGNOSIS — I10 ESSENTIAL HYPERTENSION: Primary | ICD-10-CM

## 2024-10-13 RX ORDER — ATORVASTATIN CALCIUM 40 MG/1
40 TABLET, FILM COATED ORAL NIGHTLY
Qty: 90 TABLET | Refills: 0 | Status: SHIPPED | OUTPATIENT
Start: 2024-10-13

## 2024-10-13 NOTE — TELEPHONE ENCOUNTER
Last time medication was refilled: 7/13/24  Next office visit due/scheduled: 1/30/25  Last office visit: 7/26/24  Last Labs: 10/11/24

## 2024-10-18 DIAGNOSIS — E78.2 MIXED HYPERLIPIDEMIA: ICD-10-CM

## 2024-10-18 DIAGNOSIS — I10 ESSENTIAL HYPERTENSION: ICD-10-CM

## 2024-10-18 RX ORDER — ATORVASTATIN CALCIUM 40 MG/1
40 TABLET, FILM COATED ORAL NIGHTLY
Qty: 90 TABLET | Refills: 0 | OUTPATIENT
Start: 2024-10-18

## 2024-10-23 ENCOUNTER — TELEPHONE (OUTPATIENT)
Dept: NEUROLOGY | Facility: CLINIC | Age: 74
End: 2024-10-23

## 2024-10-27 NOTE — PROGRESS NOTES
Wil Llanos is a 74 year old year old male who presents for a pre-operative physical exam.  Patient is to have right total knee replacement, to be done by Dr. Dixon at [facility] on 11/08/2024.    HPI:    74 y.o. male with hx of Afib, pulmonary HTN, CKD, HTN, hyperlipidemia, KASH needs medical clearance for surgery.  Pt continues to have right knee pain that is interfering with daily activities, failed conservative treatment with medication, PT, steroid injections.   Pt was already cleared by cardiology.  Pt was recently treated with Doxycycline for sinus infection. Symptoms resolved. Pt still experiencing nasal congestion in the morning only. Does use BiPap at night. Denies fever, sinus pressure/pain, cough.   Functional capacity: the patient denies any chest pain or shortness of breath with activity.  States he is able to walk up 2 flights of stairs carrying heavy groceries without difficulty, shortness of breath, or chest pain.  On review of his chart, he has had no recent coronary evaluation.    Today, he has no complaints.  Medications:   Current Outpatient Medications:     traMADol 50 MG Oral Tab, Take 1 tablet (50 mg total) by mouth 2 (two) times daily., Disp: , Rfl:     multiple vitamin Oral Chew Tab, Chew 1 tablet by mouth daily., Disp: , Rfl:     atorvastatin 40 MG Oral Tab, Take 1 tablet (40 mg total) by mouth nightly., Disp: 90 tablet, Rfl: 0    gabapentin 300 MG Oral Cap, TAKE THREE CAPSULES BY MOUTH THREE TIMES DAILY, Disp: 810 capsule, Rfl: 0    celecoxib 200 MG Oral Cap, Take 1 capsule (200 mg total) by mouth as needed for Pain., Disp: , Rfl:     Meloxicam 15 MG Oral Tab, Take 1 tablet (15 mg total) by mouth as needed for Pain., Disp: , Rfl:     cetirizine 10 MG Oral Tab, Take 1 tablet (10 mg total) by mouth daily., Disp: , Rfl:     Coenzyme Q10-Vitamin E (QUNOL ULTRA COQ10 OR), Take 100 mg by mouth daily., Disp: , Rfl:     hydrALAZINE 50 MG Oral Tab, Take 1 tablet (50 mg  total) by mouth every morning. Take with 100mg in AM, Disp: , Rfl:     labetalol 200 MG Oral Tab, Take 1 tablet (200 mg total) by mouth 2 (two) times daily., Disp: , Rfl:     hydrALAZINE 100 MG Oral Tab, Take 1 tablet (100 mg total) by mouth 3 (three) times daily., Disp: , Rfl:     Ciclopirox (CICLODAN) 8 % External Solution, Apply topically nightly., Disp: , Rfl:     torsemide 20 MG Oral Tab, Take 1 tablet (20 mg total) by mouth in the morning and 1 tablet (20 mg total) before bedtime., Disp: , Rfl:     spironolactone 25 MG Oral Tab, Take 1 tablet (25 mg total) by mouth daily., Disp: , Rfl:     Fluocinolone Acetonide 0.01 % Otic Oil, INSTILL 5 DROPS INTO BOTH EARS TWICE DAILY AS NEEDED, Disp: , Rfl:     apixaban 5 MG Oral Tab, Take by mouth 2 (two) times daily., Disp: , Rfl:   Allergies: Allergies[1]  PMH:  has a past medical history of AAA (abdominal aortic aneurysm) (HCC), Allergic rhinitis, Anesthesia complication, Arrhythmia, Cataract, Complex sleep apnea syndrome (2016), Diverticulosis of large intestine, Essential hypertension, High blood pressure, Osteoarthritis, Polyneuropathy, Sleep apnea, and Umbilical hernia without obstruction or gangrene.    He has no past medical history of Diabetes (HCC), Difficult intubation, Malignant hyperthermia, PONV (postoperative nausea and vomiting), or Pseudocholinesterase deficiency.  Surgical Hx:  has a past surgical history that includes tonsillectomy; total hip replacement (Left); arthroscopy of joint unlisted (Right); other; and other.  Family Hx: family history includes Heart Disorder in his father; Other in his mother.  Social Hx:   Social History     Socioeconomic History    Marital status:      Spouse name: Not on file    Number of children: Not on file    Years of education: Not on file    Highest education level: Not on file   Occupational History    Not on file   Tobacco Use    Smoking status: Former     Current packs/day: 0.00     Average packs/day: 1  pack/day for 20.0 years (20.0 ttl pk-yrs)     Types: Cigarettes     Start date: 1981     Quit date: 2001     Years since quittin.2     Passive exposure: Never    Smokeless tobacco: Never    Tobacco comments:     Quit 20+ years ago per pt   Vaping Use    Vaping status: Never Used   Substance and Sexual Activity    Alcohol use: Yes     Comment: OCC    Drug use: Never    Sexual activity: Not on file   Other Topics Concern     Service Not Asked    Blood Transfusions Not Asked    Caffeine Concern Yes     Comment: 1 cup a day    Occupational Exposure Not Asked    Hobby Hazards Not Asked    Sleep Concern Not Asked    Stress Concern Not Asked    Weight Concern Not Asked    Special Diet Not Asked    Back Care Not Asked    Exercise No    Bike Helmet Not Asked    Seat Belt Not Asked    Self-Exams Not Asked   Social History Narrative    Not on file     Social Drivers of Health     Financial Resource Strain: Not on file   Food Insecurity: Not on file   Transportation Needs: Not on file   Physical Activity: Not on file   Stress: Not on file   Social Connections: Not on file   Housing Stability: Not on file     REVIEW OF SYSTEMS:    GENERAL: feels well otherwise  SKIN: denies any unusual skin lesions  EYES:denies blurred vision or double vision  HEENT: denies  sinus pain or sore throat. + nasal congestion in AM only  LUNGS: denies shortness of breath with exertion. Denies history of  COPD. + sleep apnea compliant with BiPap  CARDIOVASCULAR: denies chest pain on exertion  GI: denies abdominal pain, denies heartburn  : denies dysuria, denies nocturia or changes in stream  MUSCULOSKELETAL: chronic back pain  NEURO: denies headaches, dizziness, fainting  PSYCH: denies depression or anxiety  HEMATOLOGIC: denies hx of anemia, bleeding or clotting disorders  ENDOCRINE: denies thyroid history or diabetes  ALL/ASTHMA: denies hx of allergy or asthma. Denies hx of adverse reaction to anesthesia or analgesics.  EXAM:     /70   Pulse (!) 47   Temp 97.1 °F (36.2 °C) (Temporal)   Resp 16   Ht 6' 2\" (1.88 m)   Wt 274 lb 6.4 oz (124.5 kg)   SpO2 98%   BMI 35.23 kg/m²   GENERAL: well developed, well nourished, in no apparent distress  SKIN: no rashes, no suspicious lesions.  Warm and dry.  HEENT: atraumatic, normocephalic, ears and throat are clear  EYES: PERRLA, EOMI, normal optic disk, conjunctiva are clear  NECK: supple, no adenopathy, no bruits b/l  CHEST: no chest tenderness  LUNGS: clear to auscultation b/l  CARDIO: RRR without murmur  GI: good BS's, no masses, HSM or tenderness  MUSCULOSKELETAL: No CVA tenderness, FROM of the back  EXTREMITIES: no cyanosis, clubbing or edema  NEURO: Oriented times three, cranial nerves are intact, motor and sensory are grossly intact. DTRs +2 and symmetric b/l.    ASSESSMENT AND PLAN:    Wil Llanos is a 74 year old male who presents for cardiac risk assessment prior to his right total knee replacement, to be done by Dr. Dixon at [facility] on 11/08/2024.      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    Pt is aware to hold Eliquis 2 days prior to OR.  Pt is aware to stop Celecoxib now.  Pt will be seeing pulmonologist next week. Will complete chest xray today.  Start taking antihistamine (Zyrtec) daily.    The patient has a  perioperative risk of major adverse cardiac event that is less than 1%.  he has no clinical markers and an exercise capacity of greater than 4 metabolic units.   Therefore according to ACC/AHA guidelines, he is therefore cleared for surgery with low cardiovascular risk.  Because of this, the patient is not a candidate for perioperative beta blockade.    This consult was sent back to the referring physician, Dr. Dixon.         [1]   Allergies  Allergen Reactions    Amlodipine SWELLING    Penicillins SWELLING    Rivaroxaban HIVES, ITCHING and RASH     Hives on legs    Vancomycin HALLUCINATION, ITCHING, OTHER (SEE COMMENTS), RASH and UNKNOWN     Elevated temperature    Hydralazine ITCHING     Red tablet pt is allergic to, takes orange tab is okay no problems      Losartan FACE FLUSHING    Ace Inhibitors Coughing    Lisinopril RASH

## 2024-10-29 ENCOUNTER — OFFICE VISIT (OUTPATIENT)
Dept: INTERNAL MEDICINE CLINIC | Facility: CLINIC | Age: 74
End: 2024-10-29
Payer: MEDICARE

## 2024-10-29 ENCOUNTER — HOSPITAL ENCOUNTER (OUTPATIENT)
Dept: GENERAL RADIOLOGY | Age: 74
Discharge: HOME | End: 2024-10-29
Payer: MEDICARE

## 2024-10-29 VITALS
RESPIRATION RATE: 16 BRPM | DIASTOLIC BLOOD PRESSURE: 70 MMHG | WEIGHT: 274.38 LBS | SYSTOLIC BLOOD PRESSURE: 132 MMHG | HEIGHT: 74 IN | OXYGEN SATURATION: 98 % | TEMPERATURE: 97 F | BODY MASS INDEX: 35.21 KG/M2 | HEART RATE: 47 BPM

## 2024-10-29 DIAGNOSIS — Z01.818 PREOP EXAM FOR INTERNAL MEDICINE: Primary | ICD-10-CM

## 2024-10-29 DIAGNOSIS — I27.0 PULMONARY HYPERTENSION, PRIMARY (HCC): ICD-10-CM

## 2024-10-29 DIAGNOSIS — I10 ESSENTIAL HYPERTENSION: ICD-10-CM

## 2024-10-29 DIAGNOSIS — Z01.818 PREOP EXAM FOR INTERNAL MEDICINE: ICD-10-CM

## 2024-10-29 DIAGNOSIS — I48.0 PAROXYSMAL ATRIAL FIBRILLATION (HCC): ICD-10-CM

## 2024-10-29 DIAGNOSIS — G47.33 OBSTRUCTIVE SLEEP APNEA SYNDROME: ICD-10-CM

## 2024-10-29 DIAGNOSIS — N18.32 STAGE 3B CHRONIC KIDNEY DISEASE (HCC): ICD-10-CM

## 2024-10-29 DIAGNOSIS — M17.0 PRIMARY OSTEOARTHRITIS OF BOTH KNEES: ICD-10-CM

## 2024-10-29 PROCEDURE — G2211 COMPLEX E/M VISIT ADD ON: HCPCS

## 2024-10-29 PROCEDURE — 71046 X-RAY EXAM CHEST 2 VIEWS: CPT

## 2024-10-29 PROCEDURE — 99214 OFFICE O/P EST MOD 30 MIN: CPT

## 2024-10-29 RX ORDER — MULTIVIT-MIN/FOLIC ACID/LUTEIN 400-250MCG
1 TABLET,CHEWABLE ORAL DAILY
COMMUNITY

## 2024-10-29 RX ORDER — TRAMADOL HYDROCHLORIDE 50 MG/1
50 TABLET ORAL 2 TIMES DAILY
COMMUNITY
Start: 2024-10-07

## 2024-10-29 NOTE — PATIENT INSTRUCTIONS
Stop the Celecoxib  Ok to take Tramadol and Tylenol for pain.  Stop Eliquis 48hrs prior to surgery   Start taking daily antihistamine (Zyrtec)

## 2024-11-01 ENCOUNTER — OFFICE VISIT (OUTPATIENT)
Dept: HEMATOLOGY/ONCOLOGY | Age: 74
End: 2024-11-01
Attending: Other
Payer: MEDICARE

## 2024-11-01 VITALS
RESPIRATION RATE: 16 BRPM | HEART RATE: 48 BPM | DIASTOLIC BLOOD PRESSURE: 70 MMHG | SYSTOLIC BLOOD PRESSURE: 146 MMHG | TEMPERATURE: 98 F | OXYGEN SATURATION: 97 % | WEIGHT: 275 LBS | BODY MASS INDEX: 35 KG/M2

## 2024-11-01 DIAGNOSIS — G61.89 POLYNEUROPATHY ASSOCIATED WITH MONOCLONAL IGM ANTIBODIES TO MYELIN-ASSOCIATED GLYCOPROTEIN (HCC): Primary | ICD-10-CM

## 2024-11-01 PROCEDURE — 96366 THER/PROPH/DIAG IV INF ADDON: CPT

## 2024-11-01 PROCEDURE — 96365 THER/PROPH/DIAG IV INF INIT: CPT

## 2024-11-01 RX ORDER — DIPHENHYDRAMINE HYDROCHLORIDE 50 MG/ML
25 INJECTION INTRAMUSCULAR; INTRAVENOUS ONCE
OUTPATIENT
Start: 2024-11-29

## 2024-11-01 RX ORDER — DIPHENHYDRAMINE HCL 25 MG
25 CAPSULE ORAL ONCE
OUTPATIENT
Start: 2024-11-29

## 2024-11-01 RX ORDER — ACETAMINOPHEN 325 MG/1
650 TABLET ORAL ONCE
Status: COMPLETED | OUTPATIENT
Start: 2024-11-01 | End: 2024-11-01

## 2024-11-01 RX ORDER — DIPHENHYDRAMINE HCL 25 MG
25 CAPSULE ORAL ONCE
Status: COMPLETED | OUTPATIENT
Start: 2024-11-01 | End: 2024-11-01

## 2024-11-01 RX ORDER — ACETAMINOPHEN 325 MG/1
650 TABLET ORAL ONCE
OUTPATIENT
Start: 2024-11-29

## 2024-11-01 RX ADMIN — ACETAMINOPHEN 650 MG: 325 TABLET ORAL at 10:27:00

## 2024-11-01 RX ADMIN — DIPHENHYDRAMINE HCL 25 MG: 25 MG CAPSULE ORAL at 10:27:00

## 2024-11-29 ENCOUNTER — OFFICE VISIT (OUTPATIENT)
Dept: HEMATOLOGY/ONCOLOGY | Age: 74
End: 2024-11-29
Attending: Other
Payer: MEDICARE

## 2024-11-29 ENCOUNTER — APPOINTMENT (OUTPATIENT)
Dept: HEMATOLOGY/ONCOLOGY | Age: 74
End: 2024-11-29
Attending: Other
Payer: MEDICARE

## 2024-11-29 VITALS
SYSTOLIC BLOOD PRESSURE: 168 MMHG | RESPIRATION RATE: 18 BRPM | OXYGEN SATURATION: 97 % | BODY MASS INDEX: 34 KG/M2 | DIASTOLIC BLOOD PRESSURE: 66 MMHG | WEIGHT: 264.5 LBS | HEART RATE: 82 BPM

## 2024-11-29 DIAGNOSIS — G61.89 POLYNEUROPATHY ASSOCIATED WITH MONOCLONAL IGM ANTIBODIES TO MYELIN-ASSOCIATED GLYCOPROTEIN (HCC): Primary | ICD-10-CM

## 2024-11-29 PROCEDURE — 96366 THER/PROPH/DIAG IV INF ADDON: CPT

## 2024-11-29 PROCEDURE — 96365 THER/PROPH/DIAG IV INF INIT: CPT

## 2024-11-29 RX ORDER — DIPHENHYDRAMINE HYDROCHLORIDE 50 MG/ML
25 INJECTION INTRAMUSCULAR; INTRAVENOUS ONCE
OUTPATIENT
Start: 2024-12-27

## 2024-11-29 RX ORDER — ACETAMINOPHEN 325 MG/1
650 TABLET ORAL ONCE
Status: COMPLETED | OUTPATIENT
Start: 2024-11-29 | End: 2024-11-29

## 2024-11-29 RX ORDER — DIPHENHYDRAMINE HCL 25 MG
25 CAPSULE ORAL ONCE
Status: COMPLETED | OUTPATIENT
Start: 2024-11-29 | End: 2024-11-29

## 2024-11-29 RX ORDER — DIPHENHYDRAMINE HCL 25 MG
25 CAPSULE ORAL ONCE
OUTPATIENT
Start: 2024-12-27

## 2024-11-29 RX ORDER — ACETAMINOPHEN 325 MG/1
650 TABLET ORAL ONCE
OUTPATIENT
Start: 2024-12-27

## 2024-11-29 RX ADMIN — ACETAMINOPHEN 650 MG: 325 TABLET ORAL at 10:38:00

## 2024-11-29 RX ADMIN — DIPHENHYDRAMINE HCL 25 MG: 25 MG CAPSULE ORAL at 10:38:00

## 2024-11-29 NOTE — PROGRESS NOTES
Pt here for IVIG . Pt denies any issues or concerns.      Ordering Provider: Dr. Cox        Pt tolerated infusion without difficulty or complaint. 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

## 2024-12-03 ENCOUNTER — OFFICE VISIT (OUTPATIENT)
Dept: INTERNAL MEDICINE CLINIC | Facility: CLINIC | Age: 74
End: 2024-12-03
Payer: MEDICARE

## 2024-12-03 VITALS
RESPIRATION RATE: 16 BRPM | DIASTOLIC BLOOD PRESSURE: 72 MMHG | TEMPERATURE: 98 F | OXYGEN SATURATION: 99 % | HEIGHT: 74 IN | SYSTOLIC BLOOD PRESSURE: 138 MMHG | BODY MASS INDEX: 33.88 KG/M2 | HEART RATE: 56 BPM | WEIGHT: 264 LBS

## 2024-12-03 DIAGNOSIS — I10 ESSENTIAL HYPERTENSION: Primary | ICD-10-CM

## 2024-12-03 PROCEDURE — 99213 OFFICE O/P EST LOW 20 MIN: CPT | Performed by: INTERNAL MEDICINE

## 2024-12-03 RX ORDER — FERROUS SULFATE 325(65) MG
325 TABLET, DELAYED RELEASE (ENTERIC COATED) ORAL
COMMUNITY

## 2024-12-03 RX ORDER — DOCUSATE SODIUM 100 MG/1
100 CAPSULE, LIQUID FILLED ORAL 2 TIMES DAILY
COMMUNITY

## 2024-12-03 RX ORDER — SILDENAFIL 100 MG/1
TABLET, FILM COATED ORAL
Qty: 24 TABLET | Refills: 3 | Status: SHIPPED | OUTPATIENT
Start: 2024-12-03

## 2024-12-03 NOTE — PROGRESS NOTES
Subjective:   Patient ID: Wil Llanos is a 74 year old male.    HPI  Wil Llanos is a 74 year old male.     HPI:   Patient presents for recheck of his hypertension. Pt has been taking medications as instructed, no medication side effects, home BP monitoring in the range of 130's systolic and 70's diastolic.  Doing well on post op for right knee TKR  Wt Readings from Last 6 Encounters:   12/03/24 264 lb (119.7 kg)   11/29/24 264 lb 8 oz (120 kg)   11/01/24 275 lb (124.7 kg)   10/29/24 274 lb 6.4 oz (124.5 kg)   10/10/24 271 lb (122.9 kg)   10/04/24 274 lb (124.3 kg)     Body mass index is 33.9 kg/m².    Lab Results   Component Value Date    CHOLEST 149 05/07/2024    CHOLEST 144 11/28/2023    CHOLEST 146 02/22/2023     Lab Results   Component Value Date    HDL 67 (H) 05/07/2024    HDL 64 (H) 11/28/2023    HDL 60 (H) 02/22/2023     Lab Results   Component Value Date    TRIG 74 05/07/2024    TRIG 54 11/28/2023    TRIG 96 02/22/2023     Lab Results   Component Value Date    LDL 68 05/07/2024    LDL 68 11/28/2023    LDL 68 02/22/2023     Lab Results   Component Value Date    AST 34 (H) 10/11/2024    AST 31 06/28/2024    AST 63 (H) 05/07/2024     Lab Results   Component Value Date    ALT 31 10/11/2024    ALT 40 06/28/2024    ALT 53 05/07/2024     Lab Results   Component Value Date    GLU 94 10/11/2024    GLU 88 05/07/2024     (H) 11/28/2023       Current Outpatient Medications   Medication Sig Dispense Refill    docusate sodium 100 MG Oral Cap Take 1 capsule (100 mg total) by mouth 2 (two) times daily.      ferrous sulfate 325 (65 FE) MG Oral Tab EC Take 1 tablet (325 mg total) by mouth daily with breakfast.      traMADol 50 MG Oral Tab Take 1 tablet (50 mg total) by mouth 2 (two) times daily.      multiple vitamin Oral Chew Tab Chew 1 tablet by mouth daily.      atorvastatin 40 MG Oral Tab Take 1 tablet (40 mg total) by mouth nightly. 90 tablet 0    gabapentin 300 MG Oral Cap TAKE THREE CAPSULES BY  MOUTH THREE TIMES DAILY 810 capsule 0    celecoxib 200 MG Oral Cap Take 1 capsule (200 mg total) by mouth as needed for Pain.      Meloxicam 15 MG Oral Tab Take 1 tablet (15 mg total) by mouth as needed for Pain.      cetirizine 10 MG Oral Tab Take 1 tablet (10 mg total) by mouth daily.      Coenzyme Q10-Vitamin E (QUNOL ULTRA COQ10 OR) Take 100 mg by mouth daily.      hydrALAZINE 50 MG Oral Tab Take 1 tablet (50 mg total) by mouth every morning. Take with 100mg in AM      labetalol 200 MG Oral Tab Take 1 tablet (200 mg total) by mouth 2 (two) times daily.      hydrALAZINE 100 MG Oral Tab Take 1 tablet (100 mg total) by mouth 3 (three) times daily.      Ciclopirox (CICLODAN) 8 % External Solution Apply topically nightly.      torsemide 20 MG Oral Tab Take 1 tablet (20 mg total) by mouth in the morning and 1 tablet (20 mg total) before bedtime.      spironolactone 25 MG Oral Tab Take 1 tablet (25 mg total) by mouth daily.      Fluocinolone Acetonide 0.01 % Otic Oil INSTILL 5 DROPS INTO BOTH EARS TWICE DAILY AS NEEDED      apixaban 5 MG Oral Tab Take by mouth 2 (two) times daily.        Past Medical History:    AAA (abdominal aortic aneurysm) (HCC)    Allergic rhinitis    Anesthesia complication    Wakes up violent    Arrhythmia    afib    Cataract    Complex sleep apnea syndrome    Diverticulosis of large intestine    Essential hypertension    High blood pressure    Osteoarthritis    Polyneuropathy    Sleep apnea    Umbilical hernia without obstruction or gangrene      Past Surgical History:   Procedure Laterality Date    Arthroscopy of joint unlisted Right     Meniscectomy    Other      Nasal surgery    Other      Hemorrhoidectomy and fistula repair    Tonsillectomy      Total hip replacement Left       Social History:    Social History     Socioeconomic History    Marital status:    Tobacco Use    Smoking status: Former     Current packs/day: 0.00     Average packs/day: 1 pack/day for 20.0 years (20.0 ttl  pk-yrs)     Types: Cigarettes     Start date: 1981     Quit date: 2001     Years since quittin.3     Passive exposure: Never    Smokeless tobacco: Never    Tobacco comments:     Quit 20+ years ago per pt   Vaping Use    Vaping status: Never Used   Substance and Sexual Activity    Alcohol use: Yes     Comment: OCC    Drug use: Never   Other Topics Concern    Caffeine Concern Yes     Comment: 1 cup a day    Exercise No         REVIEW OF SYSTEMS:   GENERAL HEALTH: feels well otherwise  SKIN: denies any unusual skin lesions or rashes  RESPIRATORY: denies shortness of breath with exertion  CARDIOVASCULAR: denies chest pain on exertion  GI: denies abdominal pain and denies heartburn  NEURO: denies headaches    EXAM:   /72   Pulse 56   Temp 97.9 °F (36.6 °C)   Resp 16   Ht 6' 2\" (1.88 m)   Wt 264 lb (119.7 kg)   SpO2 99%   BMI 33.90 kg/m²   GENERAL: well developed, well nourished,in no apparent distress  SKIN: no rashes,no suspicious lesions  HEENT: atraumatic, normocephalic,ears and throat are clear  NECK: supple,no adenopathy,no bruits  LUNGS: clear to auscultation  CARDIO: RRR without murmur  GI: good BS's,no masses, HSM or tenderness  EXTREMITIES: no cyanosis, clubbing or edema    ASSESSMENT AND PLAN:   Pt presents for a recheck of his hypertension. BP is well controlled, no significant medication side effects noted.  PLAN: will continue present medications, reviewed diet, exercise and weight control.   Cont post op care per ortho  The patient indicates understanding of these issues and agrees to the plan.  The patient is asked to return in 6 m.    History/Other:   Review of Systems  Current Outpatient Medications   Medication Sig Dispense Refill    docusate sodium 100 MG Oral Cap Take 1 capsule (100 mg total) by mouth 2 (two) times daily.      ferrous sulfate 325 (65 FE) MG Oral Tab EC Take 1 tablet (325 mg total) by mouth daily with breakfast.      traMADol 50 MG Oral Tab Take 1 tablet (50  mg total) by mouth 2 (two) times daily.      multiple vitamin Oral Chew Tab Chew 1 tablet by mouth daily.      atorvastatin 40 MG Oral Tab Take 1 tablet (40 mg total) by mouth nightly. 90 tablet 0    gabapentin 300 MG Oral Cap TAKE THREE CAPSULES BY MOUTH THREE TIMES DAILY 810 capsule 0    celecoxib 200 MG Oral Cap Take 1 capsule (200 mg total) by mouth as needed for Pain.      Meloxicam 15 MG Oral Tab Take 1 tablet (15 mg total) by mouth as needed for Pain.      cetirizine 10 MG Oral Tab Take 1 tablet (10 mg total) by mouth daily.      Coenzyme Q10-Vitamin E (QUNOL ULTRA COQ10 OR) Take 100 mg by mouth daily.      hydrALAZINE 50 MG Oral Tab Take 1 tablet (50 mg total) by mouth every morning. Take with 100mg in AM      labetalol 200 MG Oral Tab Take 1 tablet (200 mg total) by mouth 2 (two) times daily.      hydrALAZINE 100 MG Oral Tab Take 1 tablet (100 mg total) by mouth 3 (three) times daily.      Ciclopirox (CICLODAN) 8 % External Solution Apply topically nightly.      torsemide 20 MG Oral Tab Take 1 tablet (20 mg total) by mouth in the morning and 1 tablet (20 mg total) before bedtime.      spironolactone 25 MG Oral Tab Take 1 tablet (25 mg total) by mouth daily.      Fluocinolone Acetonide 0.01 % Otic Oil INSTILL 5 DROPS INTO BOTH EARS TWICE DAILY AS NEEDED      apixaban 5 MG Oral Tab Take by mouth 2 (two) times daily.       Allergies:Allergies[1]    Objective:   Physical Exam    Assessment & Plan:   1. Essential hypertension        No orders of the defined types were placed in this encounter.      Meds This Visit:  Requested Prescriptions      No prescriptions requested or ordered in this encounter       Imaging & Referrals:  None         [1]   Allergies  Allergen Reactions    Amlodipine SWELLING    Penicillins SWELLING    Rivaroxaban HIVES, ITCHING and RASH     Hives on legs    Vancomycin HALLUCINATION, ITCHING, OTHER (SEE COMMENTS), RASH and UNKNOWN     Elevated temperature    Hydralazine ITCHING     Red  tablet pt is allergic to, takes orange tab is okay no problems      Losartan FACE FLUSHING    Ace Inhibitors Coughing    Lisinopril RASH

## 2024-12-05 ENCOUNTER — HOSPITAL ENCOUNTER (OUTPATIENT)
Dept: ULTRASOUND IMAGING | Facility: HOSPITAL | Age: 74
Discharge: HOME | End: 2024-12-05
Payer: MEDICARE

## 2024-12-05 DIAGNOSIS — M79.89 SWELLING OF LIMB: ICD-10-CM

## 2024-12-05 PROCEDURE — 93971 EXTREMITY STUDY: CPT

## 2024-12-07 ENCOUNTER — HOSPITAL ENCOUNTER (EMERGENCY)
Age: 74
Discharge: HOME | End: 2024-12-07
Attending: EMERGENCY MEDICINE
Payer: MEDICARE

## 2024-12-07 VITALS
RESPIRATION RATE: 18 BRPM | HEART RATE: 69 BPM | SYSTOLIC BLOOD PRESSURE: 105 MMHG | HEIGHT: 74 IN | TEMPERATURE: 99 F | OXYGEN SATURATION: 96 % | WEIGHT: 268 LBS | BODY MASS INDEX: 34.39 KG/M2 | DIASTOLIC BLOOD PRESSURE: 62 MMHG

## 2024-12-07 DIAGNOSIS — S81.011A KNEE LACERATION, RIGHT, INITIAL ENCOUNTER: Primary | ICD-10-CM

## 2024-12-07 PROCEDURE — 99283 EMERGENCY DEPT VISIT LOW MDM: CPT

## 2024-12-07 PROCEDURE — 12001 RPR S/N/AX/GEN/TRNK 2.5CM/<: CPT

## 2024-12-08 NOTE — ED INITIAL ASSESSMENT (HPI)
Pt, who had a knee replacement on November 8th, presents to ER because he fell tonight and ripped the stitches open. He does take Eliquis. He did NOT hit his head when he fell.

## 2024-12-08 NOTE — ED PROVIDER NOTES
Patient Seen in: [facility] In [place]      History     Chief Complaint   Patient presents with    Fall     Stated Complaint: Pt, who had a knee replacement on , presents to ER because he fell *    Subjective:   HPI    74-year-old male with a history of a AAA, history of atrial fibrillation, history of hypertension, chronic anticoagulation, history of a recent right knee replacement surgery presents to the emergency department for complaints of dehiscence of his wound patient states that he was walking his dog when he lost his balance and fell landing onto his knee..  The wound then opened up approximately 2 cm.  He denies any bleeding.  Denies any pain or difficulty with ambulation.    Objective:     Past Medical History:    AAA (abdominal aortic aneurysm) (HCC)    Allergic rhinitis    Anesthesia complication    Wakes up violent    Arrhythmia    afib    Cataract    Complex sleep apnea syndrome    Diverticulosis of large intestine    Essential hypertension    High blood pressure    Osteoarthritis    Polyneuropathy    Sleep apnea    Umbilical hernia without obstruction or gangrene              Past Surgical History:   Procedure Laterality Date    Arthroscopy of joint unlisted Right     Meniscectomy    Other      Nasal surgery    Other      Hemorrhoidectomy and fistula repair    Tonsillectomy      Total hip replacement Left                 Social History     Socioeconomic History    Marital status:    Tobacco Use    Smoking status: Former     Current packs/day: 0.00     Average packs/day: 1 pack/day for 20.0 years (20.0 ttl pk-yrs)     Types: Cigarettes     Start date: 1981     Quit date: 2001     Years since quittin.3     Passive exposure: Never    Smokeless tobacco: Never    Tobacco comments:     Quit 20+ years ago per pt   Vaping Use    Vaping status: Never Used   Substance and Sexual Activity    Alcohol use: Yes     Comment: OCC    Drug use: Never   Other  Topics Concern    Caffeine Concern Yes     Comment: 1 cup a day    Exercise No                  Physical Exam     ED Triage Vitals [12/07/24 1938]   /62   Pulse 69   Resp 18   Temp 98.7 °F (37.1 °C)   Temp src Temporal   SpO2 96 %   O2 Device        Current Vitals:   Vital Signs  BP: 105/62  Pulse: 69  Resp: 18  Temp: 98.7 °F (37.1 °C)  Temp src: Temporal    Oxygen Therapy  SpO2: 96 %        Physical Exam  General: Alert and oriented. No acute distress.  HEENT: Normocephalic. No evidence of trauma. Extraocular movements are intact.  Cardiovascular exam: Regular rate and rhythm  Lungs: Clear to auscultation bilaterally.  Abdomen: Soft, nondistended, nontender.  Extremities: No evidence of deformity. No clubbing or cyanosis.  Right knee: Patient is noted to have a anterior incision site that is well-healed over the knee.  There is a 2 cm opening of dehiscence from his fall.  There is no active bleeding noted.  No foreign body is noted  Neuro: No focal deficit is noted.    ED Course   Labs Reviewed - No data to display  Laceration was anesthetized with lidocaine with epinephrine locally.  The wound was cleansed and irrigated copiously.  There was no visible foreign body within the wound. The wound was closed using a simple closure with staples.  The quality of the closure was excellent         MDM      Patient was screened and evaluated during this visit.   As a treating physician attending to the patient, I determined, within reasonable clinical confidence and prior to discharge, that an emergency medical condition was not or was no longer present.  There was no indication for further evaluation, treatment or admission on an emergency basis.  Comprehensive verbal and written discharge and follow-up instructions were provided to help prevent relapse or worsening.  Patient was instructed to follow-up with her primary care provider for further evaluation and treatment, but to return immediately to the ER for  worsening, concerning, new, changing or persisting symptoms.  I discussed the case with the patient and they had no questions, complaints, or concerns.  Patient felt comfortable going home.    ^^Please note that this report has been produced using speech recognition software and may contain errors related to that system including, but not limited to, errors in grammar, punctuation, and spelling, as well as words and phrases that possibly may have been recognized inappropriately.  If there are any questions or concerns, contact the dictating provider for clarification        Medical Decision Making      Disposition and Plan     Clinical Impression:  1. Knee laceration, right, initial encounter         Disposition:  Discharge  12/7/2024  9:28 pm    Follow-up:  [facility] in [address] [address]  [phone]  Follow up  For suture removal    Tres Dixon MD  [address] [facility] [id number] [phone]    Schedule an appointment as soon as possible for a visit in 10 day(s)  For suture removal          Medications Prescribed:  Current Discharge Medication List              Supplementary Documentation:

## 2024-12-08 NOTE — DISCHARGE INSTRUCTIONS
Follow-up in the emergency department or with your orthopedic surgeon in 10 days for staple removal  Return sooner if there is any increased pain swelling or redness or concern for infection

## 2024-12-16 DIAGNOSIS — G62.9 NEUROPATHY: ICD-10-CM

## 2024-12-16 RX ORDER — GABAPENTIN 300 MG/1
CAPSULE ORAL
Qty: 810 CAPSULE | Refills: 1 | OUTPATIENT
Start: 2024-12-16

## 2024-12-16 NOTE — TELEPHONE ENCOUNTER
Medication: Gabapentin 300 mg     Date of last refill: 09/16/2024  [place]      Last office visit: 08/14/2024  Due back to clinic per last office note:    Date next office visit scheduled:    Future Appointments   Date Time Provider Department Center   12/17/2024 10:40 AM Mode Cox MD [facility] [place]   12/27/2024 10:30 AM PF TX RN4 [facility] [place] [facility]   1/24/2025 10:30 AM PF TX RN3 [facility]   2/21/2025 10:30 AM PF TX RN2 [facility]   7/15/2025 10:30 AM Hayder John MD [facility] [address]           Last OV note recommendation:    Impression/ Plan:  Wil Llanos is a 74 year old male who originally presented 8/24/2023 for evaluation of multiple complaints.  He started to have cramping in both legs in the past 6 months.  He also had numbness in toes 3 yrs ago.  He states he intermittently is having \"locking up\" of hands with thumb moving inward on R or L side - states this has been in the past few weeks.       Neurologic exam showed decreased sensation in both feet with depressed DTRs distally as well as reduced sensation in UE to vibration. In addition, there was some weakness in APB muscles in UE bilaterally. Signs/symptoms are overall concerning for neuropathy.  In addition, NCS/EMG done of both legs showed some asymmetry with absent sural sensory response on left side and present but reduced on R side. Tibial motor response showed bilateral temporal dispersion proximally and this could suggest an atypical neuropathy or be due to artifact.       In order to further evaluate, NCS/EMG of both upper extremities was done and showed evidence for sensory neuropathy with axonal and demyelinating features along with median entrapment neuropathy across the right wrist, suggestive of carpal tunnel syndrome, which is mild.  Labs were done and showed mild elevation in kappa free light chain but normal ratio and borderline B12 level with  normal ESR, CRP, RF.  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 Neuropathy  As noted above      Return in about 4 months (around 12/14/2024).

## 2024-12-16 NOTE — TELEPHONE ENCOUNTER
Patient had knee replacement surgery November 8  and has had complications with a fall to knee with laceration on 12/7 and subsequent swelling and currently has a wound vac in place.  Patient can try to come to office, but is wondering if Dr Cox can get him in on Dec 19th instead. Has follow up for knee on 12/18 and may have wound vac removed on that day.  Patient also asked about video visit for this visit tomorrow.   [place] and has enough GABAPENTIN to last about 7-10 days from today.    Routed to provider.

## 2024-12-18 DIAGNOSIS — G62.9 NEUROPATHY: ICD-10-CM

## 2024-12-18 NOTE — TELEPHONE ENCOUNTER
Original knee replacement 12/8/24. Patient fell walking his dog on 12/15/24. Patient currently inpatient post re-do knee replacement.     Patient last seen 8/14/24. Patient has new appointment 1/22/25, after discharge and any possible rehab.     Patient requesting gabapentin 300mg refill. Per patient last visit tried lower dose of gabapentin but spasms started again within 2 days. Patient requesting to remain on the current dose which manages symptoms.     Patient also reports \"near miss\" with falls. Patient unable to describe any precipitating symptoms like: feeling dizzy, lightheadedness, or weakness in legs. He also reports biting his inside of lip and tongue.

## 2024-12-18 NOTE — TELEPHONE ENCOUNTER
Patient calling he  stated [place] and that's why didn't make it to his appoinment yesterday 12/17/24. He would like to talk to Dr. Cox about his medications.

## 2024-12-19 RX ORDER — GABAPENTIN 300 MG/1
CAPSULE ORAL
Qty: 90 CAPSULE | Refills: 0 | OUTPATIENT
Start: 2024-12-19

## 2024-12-19 RX ORDER — GABAPENTIN 300 MG/1
CAPSULE ORAL
Qty: 270 CAPSULE | Refills: 0 | Status: SHIPPED | OUTPATIENT
Start: 2024-12-19

## 2024-12-19 NOTE — TELEPHONE ENCOUNTER
Patient calling to verify if gabapentin approved and sent to pharmacy.    Please call patient to discuss and advise on any updates.

## 2024-12-26 ENCOUNTER — TELEPHONE (OUTPATIENT)
Dept: NEUROLOGY | Facility: CLINIC | Age: 74
End: 2024-12-26

## 2024-12-26 NOTE — TELEPHONE ENCOUNTER
Spoke to patient who reports falling and having a partially revised knee replacement at [facility].  He reports his implant got infected and he was discharged home with a PICC line for IV antibiotics for 6 weeks.  He is asking if he should received his monthly infusion of Gammagard tomorrow or hold off until he completes the IV antibiotics. He also reports magaly C-diff from the hospital stay.    He is informed that per CAROLYN Holt, he should hold off on  the Gammagard infusion for tomorrow and then defer to Dr. Cox for future infusions.  He expressed understanding.    He also reports being low on Gabapentin and would like to make sure he can get a refill as he is not going to be receiving the Gammagard infusion tomorrow.  He states he will call the pharmacy to see if they have a refill for him and if not, he will call the office back and request one.

## 2024-12-26 NOTE — TELEPHONE ENCOUNTER
Patient calling advised that he was recently hospitalized and has been put on outpatient IV antibiotics. States that he is taking ceftriaxone and daptonycin, and dosing is 2x/day for the next 6 weeks.     Patient is scheduled for his gammagard infusion tomorrow and wants to know if he can still get this infusion while on these IV antibiotics.

## 2024-12-26 NOTE — TELEPHONE ENCOUNTER
Jose A called back and reported that his pharmacy does have a refill of Gabapentin for a one month supply and he will discuss future refills with Dr. Cox at his upcoming appointment on 1/22/25.

## 2024-12-27 ENCOUNTER — APPOINTMENT (OUTPATIENT)
Age: 74
End: 2024-12-27
Attending: Other
Payer: MEDICARE

## 2025-01-03 ENCOUNTER — TELEPHONE (OUTPATIENT)
Dept: INTERNAL MEDICINE CLINIC | Facility: CLINIC | Age: 75
End: 2025-01-03

## 2025-01-03 LAB
AMB EXT BILIRUBIN, TOTAL: 0.4 MG/DL
AMB EXT BUN: 32 MG/DL
AMB EXT CALCIUM: 9.2
AMB EXT CARBON DIOXIDE: 29
AMB EXT CHLORIDE: 101
AMB EXT CMP ALT: 28 U/L
AMB EXT CMP AST: 27 U/L
AMB EXT GLUCOSE: 138 MG/DL
AMB EXT HEMATOCRIT: 35.3
AMB EXT HEMOGLOBIN: 11.6
AMB EXT MCV: 89.59
AMB EXT PLATELETS: 292
AMB EXT POSTASSIUM: 4.5 MMOL/L
AMB EXT SODIUM: 139 MMOL/L
AMB EXT TOTAL PROTEIN: 6.6
AMB EXT WBC: 6.7 X10(3)UL

## 2025-01-03 NOTE — TELEPHONE ENCOUNTER
External lab results reviewed by Dr. John: \"Notify pt CMP and CBC are normal\"    Labs added to external lab console. Attached VOIQ scan sheet and placed to scan. [employer] message sent to patient with results.

## 2025-01-07 LAB
AMB EXT BILIRUBIN, TOTAL: 0.5 MG/DL
AMB EXT BUN: 29 MG/DL
AMB EXT CALCIUM: 9.4
AMB EXT CARBON DIOXIDE: 27
AMB EXT CHLORIDE: 104
AMB EXT CMP ALT: 22 U/L
AMB EXT CMP AST: 26 U/L
AMB EXT CREATININE: 1.4 MG/DL
AMB EXT GLUCOSE: 149 MG/DL
AMB EXT HEMATOCRIT: 37
AMB EXT HEMOGLOBIN: 12.1
AMB EXT MCV: 90.02
AMB EXT PLATELETS: 270
AMB EXT POSTASSIUM: 4.3 MMOL/L
AMB EXT SODIUM: 140 MMOL/L
AMB EXT TOTAL PROTEIN: 7.4
AMB EXT WBC: 6.7 X10(3)UL

## 2025-01-08 ENCOUNTER — TELEPHONE (OUTPATIENT)
Dept: INTERNAL MEDICINE CLINIC | Facility: CLINIC | Age: 75
End: 2025-01-08

## 2025-01-08 NOTE — TELEPHONE ENCOUNTER
External lab results received from [facility] general Lab  Reviewed by Dr. Dora Taylor CMP and CBC  Patient notified via [employer]  Ext result console updated

## 2025-01-09 ENCOUNTER — TELEPHONE (OUTPATIENT)
Dept: INTERNAL MEDICINE CLINIC | Facility: CLINIC | Age: 75
End: 2025-01-09

## 2025-01-09 NOTE — TELEPHONE ENCOUNTER
Kalpana, spouse, contacting office to inform that patient was transported to [facility] via ambulance around 6:30 this morning. Patient had an unwitnessed fall down the stairs of his home. Kalpana states his right knee was \"split wide open\". Patient currently in ER at [facility] and contacted his spouse half an hour ago to inform her that the pain medication that was given to him is not controlling his pain. Kalpana also contacted Dr. Garcia, specialist whom completed patient's knee  surgery and informed him of the above. Informed Kalpana I would inform Dr. John of the above. Kalpana will be heading to [facility] shortly to be with patient. Dr. John made aware of patient status.

## 2025-01-20 LAB
AMB EXT BILIRUBIN, TOTAL: 0.4 MG/DL
AMB EXT BUN: 30 MG/DL
AMB EXT CALCIUM: 9.3
AMB EXT CARBON DIOXIDE: 25
AMB EXT CHLORIDE: 102
AMB EXT CMP ALT: 26 U/L
AMB EXT CMP AST: 29 U/L
AMB EXT CREATININE: 1.6 MG/DL
AMB EXT EGFR NON-AA: 45.49
AMB EXT GLUCOSE: 131 MG/DL
AMB EXT HEMATOCRIT: 31.3
AMB EXT HEMOGLOBIN: 10.4
AMB EXT MCV: 90.2
AMB EXT PLATELETS: 193
AMB EXT POSTASSIUM: 4.4 MMOL/L
AMB EXT SODIUM: 136 MMOL/L
AMB EXT TOTAL PROTEIN: 6.1
AMB EXT WBC: 6.4 X10(3)UL

## 2025-01-21 DIAGNOSIS — G62.9 NEUROPATHY: ICD-10-CM

## 2025-01-21 RX ORDER — GABAPENTIN 300 MG/1
CAPSULE ORAL
Qty: 270 CAPSULE | Refills: 0 | Status: SHIPPED | OUTPATIENT
Start: 2025-01-21 | End: 2025-01-30

## 2025-01-21 NOTE — TELEPHONE ENCOUNTER
Patient cancelled his appointment with Dr. Cox for tomorrow due to personal reasons.  He states he is still receiving IV antibiotics through a PICC line and is asking if he should receive IVIG this Friday, 1/24/25.  He is also asking if Dr. Cox would like him to taper off of Gabapentin as previously discussed, and if so, what dosage and tapering schedule.  He is informed that we will discuss this with Dr. Cox and call him back.  He verbalizes understanding.

## 2025-01-21 NOTE — TELEPHONE ENCOUNTER
Per Dr. Cox, patient should not had IVIG this Friday as he is still on IV antibiotics.  Per Dr. Cox, patient can have a refill of Gabapentin 300 mg #270 with 0 refills and tapering off the medication will be discussed at next follow up appointment.  Patient is instructed call back tomorrow and make a follow up appointment with Dr. Cox.  Patient verbalizes understanding.

## 2025-01-23 ENCOUNTER — TELEPHONE (OUTPATIENT)
Dept: INTERNAL MEDICINE CLINIC | Facility: CLINIC | Age: 75
End: 2025-01-23

## 2025-01-24 ENCOUNTER — APPOINTMENT (OUTPATIENT)
Age: 75
End: 2025-01-24
Attending: Other
Payer: MEDICARE

## 2025-01-30 DIAGNOSIS — G62.9 NEUROPATHY: ICD-10-CM

## 2025-01-30 RX ORDER — GABAPENTIN 300 MG/1
CAPSULE ORAL
Qty: 810 CAPSULE | Refills: 0 | Status: SHIPPED | OUTPATIENT
Start: 2025-01-30

## 2025-01-30 NOTE — TELEPHONE ENCOUNTER
Medication: Gabapentin 300 mg     Date of last refill: 091/21/2025  [place]      Last office visit: 08/14/2024  Due back to clinic per last office note:    Date next office visit scheduled: 02/28/2025          Future Appointments   Date Time Provider Department Center   12/17/2024 10:40 AM Mode Cox MD [facility] [place]   12/27/2024 10:30 AM PF TX RN4 PF [facility]   1/24/2025 10:30 AM PF TX RN3 PF [facility]   2/21/2025 10:30 AM PF TX RN2 PF [facility]   7/15/2025 10:30 AM Hayder John MD [facility] [address]            Last OV note recommendation:     Impression/ Plan:  Wil Llanos is a 74 year old male who originally presented 8/24/2023 for evaluation of multiple complaints.  He started to have cramping in both legs in the past 6 months.  He also had numbness in toes 3 yrs ago.  He states he intermittently is having \"locking up\" of hands with thumb moving inward on R or L side - states this has been in the past few weeks.       Neurologic exam showed decreased sensation in both feet with depressed DTRs distally as well as reduced sensation in UE to vibration. In addition, there was some weakness in APB muscles in UE bilaterally. Signs/symptoms are overall concerning for neuropathy.  In addition, NCS/EMG done of both legs showed some asymmetry with absent sural sensory response on left side and present but reduced on R side. Tibial motor response showed bilateral temporal dispersion proximally and this could suggest an atypical neuropathy or be due to artifact.       In order to further evaluate, NCS/EMG of both upper extremities was done and showed evidence for sensory neuropathy with axonal and demyelinating features along with median entrapment neuropathy across the right wrist, suggestive of carpal tunnel syndrome, which is mild.  Labs were done and showed mild elevation in kappa free light chain but normal ratio and borderline  B12 level with normal ESR, CRP, RF.  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 Neuropathy  As noted above      Return in about 4 months (around 12/14/2024).

## 2025-02-21 ENCOUNTER — APPOINTMENT (OUTPATIENT)
Age: 75
End: 2025-02-21
Payer: MEDICARE

## 2025-02-28 ENCOUNTER — OFFICE VISIT (OUTPATIENT)
Dept: NEUROLOGY | Facility: CLINIC | Age: 75
End: 2025-02-28
Payer: MEDICARE

## 2025-02-28 ENCOUNTER — TELEPHONE (OUTPATIENT)
Age: 75
End: 2025-02-28

## 2025-02-28 ENCOUNTER — TELEPHONE (OUTPATIENT)
Dept: NEUROLOGY | Facility: CLINIC | Age: 75
End: 2025-02-28

## 2025-02-28 VITALS
HEART RATE: 48 BPM | BODY MASS INDEX: 34.39 KG/M2 | DIASTOLIC BLOOD PRESSURE: 52 MMHG | SYSTOLIC BLOOD PRESSURE: 110 MMHG | HEIGHT: 74 IN | RESPIRATION RATE: 16 BRPM | WEIGHT: 268 LBS

## 2025-02-28 DIAGNOSIS — G62.9 NEUROPATHY: ICD-10-CM

## 2025-02-28 DIAGNOSIS — G61.89 POLYNEUROPATHY ASSOCIATED WITH MONOCLONAL IGM ANTIBODIES TO MYELIN-ASSOCIATED GLYCOPROTEIN (HCC): Primary | ICD-10-CM

## 2025-02-28 PROCEDURE — 99214 OFFICE O/P EST MOD 30 MIN: CPT | Performed by: OTHER

## 2025-02-28 RX ORDER — DIPHENHYDRAMINE HYDROCHLORIDE 50 MG/ML
25 INJECTION INTRAMUSCULAR; INTRAVENOUS ONCE
Status: CANCELLED | OUTPATIENT
Start: 2025-02-28

## 2025-02-28 RX ORDER — ZALEPLON 5 MG/1
5 CAPSULE ORAL NIGHTLY
COMMUNITY
Start: 2025-01-02

## 2025-02-28 RX ORDER — DIPHENHYDRAMINE HCL 25 MG
25 CAPSULE ORAL EVERY 6 HOURS PRN
COMMUNITY

## 2025-02-28 RX ORDER — GABAPENTIN 300 MG/1
CAPSULE ORAL
Qty: 810 CAPSULE | Refills: 2 | Status: SHIPPED | OUTPATIENT
Start: 2025-02-28

## 2025-02-28 RX ORDER — DOXYCYCLINE HYCLATE 100 MG
100 TABLET ORAL DAILY
COMMUNITY
Start: 2025-02-11

## 2025-02-28 RX ORDER — HYDROCODONE BITARTRATE AND ACETAMINOPHEN 5; 325 MG/1; MG/1
1 TABLET ORAL EVERY 6 HOURS PRN
COMMUNITY
Start: 2025-02-11

## 2025-02-28 NOTE — TELEPHONE ENCOUNTER
Per Dr. Cox, please resume prior regimen of IVIG monthly ASAP as patient is demonstrating symptom return.     Confirmed with Jose A that he would like to continue receiving his infusions at [facility].    Patient has straight Medicare, no PA needed.  Therapy plan renewed (signatures ).    [facility] notified via staff message.

## 2025-02-28 NOTE — PROGRESS NOTES
[facility] Progress Note    HPI    Chief Complaint   Patient presents with    Neuropathy     Last infusion ~3-4 month ago d/t surgery, notes increased balance changes, feels \"not as strong as used to be\"       As per my initial H&P from 8/24/2023,   \" Wil Llanos is a 73 year old, who presents for evaluation of multiple complaints.  He started to have cramping in both legs in the past 6 months.  He also had numbness in toes 3 yrs ago.  He states he intermittently is having \"locking up\" of hands with thumb moving inward on R or L side - states this has been in the past few weeks.     He was recommended to have NCS/EMG of both arms.  He has some neck pain but not into the hands but sometimes has dropping of objects from hands.     He also has intermittent \"blackness\" in R eye for 5 minutes with no associated n/v, or headache or focal numbness/tingling/weakness or loss of awareness.      He has noted some pain in the legs below the knee and toe with \"curl upward\" at times. .       He has had EMG both arms 35 yrs ago and noted he was weak in left arm / hand on exam     Otherwise, patient denies any recent weight change, fevers, chills, nausea, double vision/ blurry vision / loss of vision, chest pain, palpitations, shortness of breath, rashes, joint pains, bowel / bladder incontinence or mood issues. \"        Prior notes as per 10/24/2023.  Patient since last visit has continued to have locking up of hands with thumb moveing inward on R or L side.  He is on gabapentin 600 mg AM / 300 mg PM / 600 mg nightly now with some improvement in pain at night; he notes improvement in curling toes - he feels he is \"wired, like he drank a lot of coffee\" since he has been on higher dose of gabapentin but otherwise has been tolerating; he feels he may be having breakthrough symptoms and tolerance         He admits he has had some tripping and falls on uneven ground but denies worsening.           Prior notes  as per 12/12/2023.  Patient last seen 10/24/2023.  He is now on gabapentin at 600 mg tid - notes some cramping in both legs around the foot, toes curling at night and cramps in the back of the calf at night - notes he has cramping in legs when waking up at night - denies excessive sedation on gabapentin; has some swelling in ankles occasionally; he denies falls recently.  He also has locking up of hands intermittently.       Prior notes as per 4/12/2024.  Patient last seen 12/12/2023. He has had one session IVIG x5 days 2/28 to 3/1. He has been having cramping in legs waking him up at night and has locking up of hands as well; he had headache after IVIG but this persisted several days after infusion was done; no recent falls and now on gabapentin up to 900 mg tid.       Prior notes as per 8/14/2024.  Patient last seen 4/12/2024.  He has been having IVIG monthly infusions and has noted improvement in headaches he was having previously after IVIG infusion and also has improvement in burning sensation in both legs. He has noted improvement in locking up hands; he remains on gabapentin 900 mg tid and mainly has rare cramping in legs at night.        Patient last seen 8/14/2024.  He was on IVIG but had multiple knee surgery revisions and infections and has not been doing IVIG since he has been on antibiotics - he has been on IV antiobiotics since 12/26/2024 but now only on doxycycline; last course of IVIG was 11/2024.       He has some numbness in both feet; spasm in hands and legs is improved on gabapentin 900 mg tid; left arm does not feels as strong since being off IVIG and now having blurry vision recently.   Past Medical History:    AAA (abdominal aortic aneurysm)    Allergic rhinitis    Anesthesia complication    Wakes up violent    Arrhythmia    afib    Cataract    Complex sleep apnea syndrome    Diverticulosis of large intestine    Essential hypertension    High blood pressure    Osteoarthritis    Polyneuropathy     Sleep apnea    Umbilical hernia without obstruction or gangrene     Past Surgical History:   Procedure Laterality Date    Arthroscopy of joint unlisted Right     Meniscectomy    Other      Nasal surgery    Other      Hemorrhoidectomy and fistula repair    Tonsillectomy      Total hip replacement Left      Family History   Problem Relation Age of Onset    Heart Disorder Father     Other (Other) Mother         Alzheimer's     Social History     Socioeconomic History    Marital status:    Tobacco Use    Smoking status: Former     Current packs/day: 0.00     Average packs/day: 1 pack/day for 20.0 years (20.0 ttl pk-yrs)     Types: Cigarettes     Start date: 1981     Quit date: 2001     Years since quittin.6     Passive exposure: Never    Smokeless tobacco: Never    Tobacco comments:     Quit 20+ years ago per pt   Vaping Use    Vaping status: Never Used   Substance and Sexual Activity    Alcohol use: Yes     Comment: OCC    Drug use: Never   Other Topics Concern    Caffeine Concern Yes     Comment: 1 cup a day    Exercise No       Allergies   Allergen Reactions    Amlodipine SWELLING    Hydromorphone ITCHING    Penicillins SWELLING    Rivaroxaban HIVES, ITCHING and RASH     Hives on legs    Vancomycin HALLUCINATION, ITCHING, OTHER (SEE COMMENTS), RASH and UNKNOWN     Elevated temperature    Hydralazine ITCHING     Red tablet pt is allergic to, takes orange tab is okay no problems      Losartan FACE FLUSHING    Ace Inhibitors Coughing    Lisinopril RASH    Morphine ITCHING    Oxycodone ITCHING         Current Outpatient Medications:     Doxycycline Hyclate 100 MG Oral Tab, Take 1 tablet (100 mg total) by mouth daily., Disp: , Rfl:     HYDROcodone-acetaminophen 5-325 MG Oral Tab, Take 1 tablet by mouth every 6 (six) hours as needed., Disp: , Rfl:     Zaleplon 5 MG Oral Cap, Take 1 capsule (5 mg total) by mouth nightly., Disp: , Rfl:     diphenhydrAMINE 25 MG Oral Cap, Take 1 capsule (25 mg total)  by mouth every 6 (six) hours as needed for Itching., Disp: , Rfl:     gabapentin 300 MG Oral Cap, TAKE 3 CAPSULES BY MOUTH IN THE MORNING, AT NOON AND AT BEDTIME, Disp: 810 capsule, Rfl: 2    docusate sodium 100 MG Oral Cap, Take 1 capsule (100 mg total) by mouth 2 (two) times daily., Disp: , Rfl:     ferrous sulfate 325 (65 FE) MG Oral Tab EC, Take 1 tablet (325 mg total) by mouth daily with breakfast., Disp: , Rfl:     traMADol 50 MG Oral Tab, Take 1 tablet (50 mg total) by mouth 2 (two) times daily., Disp: , Rfl:     multiple vitamin Oral Chew Tab, Chew 1 tablet by mouth daily., Disp: , Rfl:     atorvastatin 40 MG Oral Tab, Take 1 tablet (40 mg total) by mouth nightly., Disp: 90 tablet, Rfl: 0    celecoxib 200 MG Oral Cap, Take 1 capsule (200 mg total) by mouth as needed for Pain., Disp: , Rfl:     Meloxicam 15 MG Oral Tab, Take 1 tablet (15 mg total) by mouth as needed for Pain., Disp: , Rfl:     cetirizine 10 MG Oral Tab, Take 1 tablet (10 mg total) by mouth daily., Disp: , Rfl:     Coenzyme Q10-Vitamin E (QUNOL ULTRA COQ10 OR), Take 100 mg by mouth daily., Disp: , Rfl:     hydrALAZINE 50 MG Oral Tab, Take 1 tablet (50 mg total) by mouth every morning. Take with 100mg in AM, Disp: , Rfl:     labetalol 200 MG Oral Tab, Take 1 tablet (200 mg total) by mouth 2 (two) times daily., Disp: , Rfl:     hydrALAZINE 100 MG Oral Tab, Take 1 tablet (100 mg total) by mouth 3 (three) times daily., Disp: , Rfl:     Ciclopirox (CICLODAN) 8 % External Solution, Apply topically nightly., Disp: , Rfl:     torsemide 20 MG Oral Tab, Take 1 tablet (20 mg total) by mouth in the morning and 1 tablet (20 mg total) before bedtime., Disp: , Rfl:     spironolactone 25 MG Oral Tab, Take 1 tablet (25 mg total) by mouth daily., Disp: , Rfl:     Fluocinolone Acetonide 0.01 % Otic Oil, INSTILL 5 DROPS INTO BOTH EARS TWICE DAILY AS NEEDED, Disp: , Rfl:     apixaban 5 MG Oral Tab, Take by mouth 2 (two) times daily., Disp: , Rfl:     Review of  Systems:  No chest pain or palpitations; otherwise as noted in HPI.    Exam:  /52 (BP Location: Left arm, Patient Position: Sitting, Cuff Size: adult)   Pulse (!) 48   Resp 16   Ht 74\"   Wt 268 lb (121.6 kg)   BMI 34.41 kg/m²   Estimated body mass index is 34.41 kg/m² as calculated from the following:    Height as of this encounter: 74\".    Weight as of this encounter: 268 lb (121.6 kg).    Gen: well developed, well nourished, no acute distress  HEENT: normocephalic  Heart; normal S1/S2, regular rate and rhythm  Lungs: clear to auscultation bilaterally  Extremities: no edema, peripheral pulses intact    Neck: supple, full range of motion; no carotid bruits    Fundoscopic Exam: optic discs sharp bilaterally    Neuro:  Mental status:  Orientation: Alert and oriented to person, place, time  Speech Fluent and conversational    CN: PERRL, EOMI with no nystagmus, VFF, smile symmetric, sensation intact, tongue and palate midline, SCM intact, otherwise, CN 2-12 intact  Motor:  left ABP weak 4/5, left LE mildly weaker 5-/5 throughout; otherwise,  5/5 throughout today  DTR:  2+ in UE and patellar; toes downgoing; no clonus; trace ankle jerk on R and absent on L    Sensory:  pin is reduced in UE up to wrists and R LE up to calf and left LE up to thigh  Vibration is  absent left great toe but present at malleolus ~8 sec; present R great toe today for ~ 5 sec; 6 L thumb in UE; 5 on R   Proprioception is normal  Coord: FNF intact with no tremor or dysmetria; rapid alternating movements intact bilaterally  Romberg: equivocal  Gait: Casual gait antalgic, mildly broad based but briefly able to walk on heels and toes; cannot tandem at all; no cane or walker needed     Labs:    None new    Prior as noted below    Component      Latest Ref Rng 10/26/2023   PROTEIN, TOTAL      5.7 - 8.2 g/dL 7.2    Albumin      3.8 - 4.8 g/dL 4.62    ALPHA-1-GLOBULINS      0.19 - 0.46 g/dL 0.22    ALPHA-2-GLOBULINS      0.48 - 1.05 g/dL 0.76     BETA GLOBULINS      0.68 - 1.23 g/dL 0.68    GAMMA GLOBULINS      0.62 - 1.70 g/dL 0.92    ALBUMIN/GLOBULIN RATIO      1.00 - 2.00  1.79    SPE INTERPRETATION No apparent monoclonal protein on serum electrophoresis.    IMMUNOFIXATION No monoclonal protein detected by immunofixation.…    KAPPA FREE LIGHT CHAIN      0.330 - 1.940 mg/dL 0.776    LAMBDA FREE LIGHT CHAIN      0.571 - 2.630 mg/dL 0.445 (L)    KAPPA/LAMBDA FLC RATIO      0.26 - 1.65  1.74 (H)    Case Report    GENERAL CATEGORIZATION:             FINAL DIAGNOSIS    Procedure    CLINICAL INFORMATION    Non Gyne Interpretation             Gross Description    WBC Calculated, CSF      /CUMM    Neutrophils CSF      0 - 6 %    Lymphocytes CSF      40 - 80 %    Mono/Macrophages CSF      15 - 45 %    EOSINOPHILS CSF      %    Basophil CSF      %    TOTAL CELLS COUNTED    GM1 Antibody, IgG      0 - 50 IV 3    GM1 Antibody, IgM      0 - 50 IV 3    GD1b Antibody, IgG      0 - 50 IV 4    GD1b Antibody, IgM      0 - 50 IV 3    GQ1b Antibody, IgG      0 - 50 IV 8    GQ1b Antibody, IgM      0 - 50 IV 3    TOTAL VOLUME CSF      mL    CSF TUBE #    Color CSF      Colorless     Clarity CSF      Clear     TNC, CSF      0 - 5 /CUMM    RBC CSF      <1 /CUMM    IMMUNOGLOBULIN G CSF      0.0 - 10.3 mg/dL    Albumin, CSF      15 - 55 mg/dL    IMMUNOGLOBULIN G      791 - 1,643 mg/dL 1,010    CSF IGG/ALBUMIN RATIO      0.00 - 0.25     IGG INDEX      0.0 - 0.7     MAG IgM Autoantibodies      0 - 999 BTU 1133 (H)    ACE, CSF      0.0 - 3.1 U/L    Glucose CSF      40 - 70 mg/dL    Total Protein CSF      15.0 - 45.0 mg/dL      Component      Latest Ref Rn 11/2/2023   PROTEIN, TOTAL      5.7 - 8.2 g/dL    Albumin      3.8 - 4.8 g/dL 4.1    ALPHA-1-GLOBULINS      0.19 - 0.46 g/dL    ALPHA-2-GLOBULINS      0.48 - 1.05 g/dL    BETA GLOBULINS      0.68 - 1.23 g/dL    GAMMA GLOBULINS      0.62 - 1.70 g/dL    ALBUMIN/GLOBULIN RATIO      1.00 - 2.00     SPE INTERPRETATION    IMMUNOFIXATION     KAPPA FREE LIGHT CHAIN      0.330 - 1.940 mg/dL    LAMBDA FREE LIGHT CHAIN      0.571 - 2.630 mg/dL    KAPPA/LAMBDA FLC RATIO      0.26 - 1.65     Case Report Non-Gynecologic Cytology Case: [id number] …    GENERAL CATEGORIZATION:          Benign, inflammatory, reactive or reparative changes    FINAL DIAGNOSIS This result contains rich text formatting which cannot be displayed here.    Procedure This result contains rich text formatting which cannot be displayed here.    CLINICAL INFORMATION This result contains rich text formatting which cannot be displayed here.    Non Gyne Interpretation          Benign    Gross Description This result contains rich text formatting which cannot be displayed here.    WBC Calculated, CSF      /CUMM 4    Neutrophils CSF      0 - 6 % 0    Lymphocytes CSF      40 - 80 % 65    Mono/Macrophages CSF      15 - 45 % 35    EOSINOPHILS CSF      % 0    Basophil CSF      % 0    TOTAL CELLS COUNTED 100    GM1 Antibody, IgG      0 - 50 IV    GM1 Antibody, IgM      0 - 50 IV    GD1b Antibody, IgG      0 - 50 IV    GD1b Antibody, IgM      0 - 50 IV    GQ1b Antibody, IgG      0 - 50 IV    GQ1b Antibody, IgM      0 - 50 IV    TOTAL VOLUME CSF      mL 19.0    CSF TUBE # 3    Color CSF      Colorless  Colorless    Clarity CSF      Clear  Clear    TNC, CSF      0 - 5 /CUMM 4    RBC CSF      <1 /CUMM 11 (H)    IMMUNOGLOBULIN G CSF      0.0 - 10.3 mg/dL 4.1    Albumin, CSF      15 - 55 mg/dL 38    IMMUNOGLOBULIN G      791 - 1,643 mg/dL 929    CSF IGG/ALBUMIN RATIO      0.00 - 0.25  0.11    IGG INDEX      0.0 - 0.7  0.5    MAG IgM Autoantibodies      0 - 999 BTU    ACE, CSF      0.0 - 3.1 U/L <1.5    Glucose CSF      40 - 70 mg/dL 58    Total Protein CSF      15.0 - 45.0 mg/dL 67.4 (H)      Component      Latest Ref [facility] 11/7/2023   PROTEIN, TOTAL      5.7 - 8.2 g/dL    Albumin      3.8 - 4.8 g/dL    ALPHA-1-GLOBULINS      0.19 - 0.46 g/dL    ALPHA-2-GLOBULINS      0.48 - 1.05 g/dL    BETA GLOBULINS       0.68 - 1.23 g/dL    GAMMA GLOBULINS      0.62 - 1.70 g/dL    ALBUMIN/GLOBULIN RATIO      1.00 - 2.00     SPE INTERPRETATION    IMMUNOFIXATION    KAPPA FREE LIGHT CHAIN      0.330 - 1.940 mg/dL    LAMBDA FREE LIGHT CHAIN      0.571 - 2.630 mg/dL    KAPPA/LAMBDA FLC RATIO      0.26 - 1.65     Case Report    GENERAL CATEGORIZATION:             FINAL DIAGNOSIS    Procedure    CLINICAL INFORMATION    Non Gyne Interpretation             Gross Description    WBC Calculated, CSF      /CUMM    Neutrophils CSF      0 - 6 %    Lymphocytes CSF      40 - 80 %    Mono/Macrophages CSF      15 - 45 %    EOSINOPHILS CSF      %    Basophil CSF      %    TOTAL CELLS COUNTED    GM1 Antibody, IgG      0 - 50 IV    GM1 Antibody, IgM      0 - 50 IV    GD1b Antibody, IgG      0 - 50 IV    GD1b Antibody, IgM      0 - 50 IV    GQ1b Antibody, IgG      0 - 50 IV    GQ1b Antibody, IgM      0 - 50 IV    TOTAL VOLUME CSF      mL    CSF TUBE #    Color CSF      Colorless     Clarity CSF      Clear     TNC, CSF      0 - 5 /CUMM    RBC CSF      <1 /CUMM    IMMUNOGLOBULIN G CSF      0.0 - 10.3 mg/dL    Albumin, CSF      15 - 55 mg/dL    IMMUNOGLOBULIN G      791 - 1,643 mg/dL 1,020    CSF IGG/ALBUMIN RATIO      0.00 - 0.25     IGG INDEX      0.0 - 0.7     MAG IgM Autoantibodies      0 - 999 BTU    ACE, CSF      0.0 - 3.1 U/L    Glucose CSF      40 - 70 mg/dL    Total Protein CSF      15.0 - 45.0 mg/dL       Legend:  (L) Low  (H) High    Component      Latest Ref [facility] 9/5/2023   PROTEIN, TOTAL      6.4 - 8.2 g/dL 7.0    Albumin      3.75 - 5.21 g/dL 4.31    ALPHA-1-GLOBULINS      0.19 - 0.46 g/dL 0.23    ALPHA-2-GLOBULINS      0.48 - 1.05 g/dL 0.76    BETA GLOBULINS      0.68 - 1.23 g/dL 0.71    GAMMA GLOBULINS      0.62 - 1.70 g/dL 0.99    ALBUMIN/GLOBULIN RATIO      1.00 - 2.00  1.60    SPE INTERPRETATION No apparent monoclonal protein on serum electrophoresis.…    IMMUNOFIXATION No monoclonal protein detected by immunofixation.…    KAPPA FREE LIGHT  CHAIN      0.330 - 1.940 mg/dL 2.037 (H)    LAMBDA FREE LIGHT CHAIN      0.571 - 2.630 mg/dL 1.358    KAPPA/LAMBDA FLC RATIO      0.26 - 1.65  1.50    Expanded STEVIE Antibody Screen, IGG      <0.7 ug/l 0.20    Anti-dsDNA antibody      <10 IU/mL <0.6    Connective Tissue Disease Screen Interpretation      Negative  Negative    Vitamin B12      193 - 986 pg/mL 352    C-REACTIVE PROTEIN      <0.30 mg/dL <0.29    SED RATE      0 - 20 mm/Hr 11    RHEUMATOID FACTOR      <15 IU/mL <10       Legend:  (H) High    Imaging:  None new    Prior as noted below    MRI SPINE CERVICAL (CPT=72141)    Result Date: 11/28/2023        FINDINGS:    CERVICAL DISC LEVELS:   C2-C3:  No significant disc/facet abnormality, spinal stenosis, or foraminal narrowing.   C3-C4:  There is mild disc desiccation.  There is a prominent right sided disc/osteophyte complex with associated bilateral extensive facet arthropathy and facet hypertrophy.  There is associated severe right neural foraminal stenosis and slightly less   pronounced moderate to severe left-sided neural foraminal stenosis.  No significant central canal stenosis.   C4-C5:  There is moderate disc desiccation with disc height loss and broad-based disc bulge.  There are bilateral uncovertebral joint disc/osteophyte complexes.  There is moderate bilateral facet arthropathy with hypertrophy.  There is mild central canal    stenosis and moderate to severe bilateral neural foraminal stenosis, greater on the right.   C5-C6:  There is moderate to extensive disc desiccation with disc height loss and broad-based disc bulge.  Bilateral uncovertebral joint disc/osteophyte complex is noted.  Moderate bilateral posterior articular facet joint arthropathy.  There is mild   central canal stenosis and moderate to severe bilateral neural foraminal stenosis.   C6-C7:  There is moderate to extensive disc desiccation with disc height loss and broad-based disc bulge.  There are prominent bilateral  uncovertebral joint disc/osteophyte complexes.  There is moderate bilateral facet arthropathy with hypertrophy.    There is secondary mild central canal stenosis and moderate to severe bilateral neural foraminal stenosis.   C7-T1:  There is moderate disc desiccation with disc height loss and broad-based disc bulge.  There is moderate bilateral facet arthropathy.  No significant central canal stenosis is noted.  There is mild to moderate bilateral neural foraminal stenosis,   greater on the left.      CRANIOCERVICAL AREA:  Normal foramen magnum with no Chiari malformation.    PARASPINAL AREA:  Normal with no visible mass.    BONY STRUCTURES:  There is no evidence of acute osseous injuries.  There is multilevel posterior facet arthropathy and uncovertebral joint arthropathy of the cervical spine as described above.  No lytic, blastic or destructive osseous changes.   CORD:  Normal caliber, contour, and signal intensity.       CONCLUSION:  1. No acute process. 2. Significant multilevel disc disease and facet arthropathy with multilevel severe neural foraminal stenosis and multilevel mild central canal stenosis as described above. 3. Normal appearance of the cervical spinal cord and visualized hind brain structures.   LOCATION:  [facility]   Dictated by (CST): Alberta Mercado DO on 11/28/2023 at 8:58 AM     Finalized by (CST): Alberta Mercado DO on 11/28/2023 at 9:04 AM          Other procedures  None new    Prior as noted below    NCS/EMG (8/29/2023):      Sensory NCS      Nerve / Sites Rec. Site Onset Lat Peak Lat NP Amp PP Amp Segments Distance Peak Diff Velocity Comment       ms ms µV µV   cm ms m/s     R Median - Dig II (Antidromic)      Wrist Index 3.33 4.22 9.3 21.2 Wrist - Index 15   45     R Ulnar - Dig V (Antidromic)      Wrist Dig V 2.71 3.59 9.9 8.2 Wrist - Dig V 13   48        2 Dig V 2.76 3.54 8.7 7.6             L Ulnar - Dig V (Antidromic)      Wrist Dig V 2.81 3.65 11.9 8.5 Wrist - Dig V 13   46     R Radial -  Superficial (Antidromic)      Forearm Wrist 1.93 2.50 22.6 8.9 Forearm - Wrist 10   52     L Radial - Superficial (Antidromic)      Forearm Wrist 1.88 2.66 19.4 4.3 Forearm - Wrist 10   53     L Median - Dig III (Antidromic)      Wrist Index 3.07 3.96 9.0 19.1 Wrist - Index 15   49        Palm Index 3.02 3.91 8.4 19.8 Palm - Index 7   23     R Median, Ulnar - Transcarpal comparison      Median Palm Wrist 2.03 2.60 16.5 23.4 Median Palm - Wrist 8   39        Ulnar Palm Wrist 1.41 1.93 9.8 8.4 Ulnar Palm - Wrist 8   57                 Median Palm - Ulnar Palm   0.68       L Median, Ulnar - Transcarpal comparison      Median Palm Wrist 1.56 2.19 26.7 40.0 Median Palm - Wrist 8   51        Ulnar Palm Wrist 1.61 2.08 9.3 14.8 Ulnar Palm - Wrist 8   50                 Median Palm - Ulnar Palm   0.10           Motor NCS      Nerve / Sites Muscle Latency Amplitude Segments Dist. Lat Diff Velocity Comments       ms mV   cm ms m/s     R Median - APB      Wrist APB 4.23 6.0 Wrist - APB 7            Elbow APB 9.96 5.5 Elbow - Wrist 27.5 5.73 48.0     L Median - APB      Wrist APB 3.56 8.3 Wrist - APB 7            Elbow APB 8.81 8.0 Elbow - Wrist 25.5 5.25 48.6     R Ulnar - ADM      Wrist ADM 2.85 9.4 Wrist - ADM 7            B.Elbow ADM 7.75 8.7 B.Elbow - Wrist 24.5 4.90 50.0     L Ulnar - ADM      Wrist ADM 2.79 9.7 Wrist - ADM 7            B.Elbow ADM 7.67 9.0 B.Elbow - Wrist 25.5 4.88 52.3         F  Wave      Nerve M Latency F Latency F-M Lat     ms ms ms   R Median - APB 4.6 35.1 30.4   R Ulnar - ADM 3.1 36.4 33.3   L Median - APB 3.8 33.7 29.9   L Ulnar - ADM 3.3 36.0 32.7                   EMG Summary Table       Spontaneous MUAP Recruitment   Muscle IA Fib PSW Fasc H.F. Amp Dur. PPP Pattern   L. Deltoid N None None None None N N N N   L. Biceps brachii N None None None None N N N N   L. Triceps brachii N None None None None N N N N   L. Extensor digitorum communis N None None None None N N N N   L. First dorsal  interosseous N None None None None N N N N   L. Abductor pollicis brevis N None None None None N N N N   R. Deltoid N None None None None N N N N   R. Biceps brachii N None None None None N N N N   R. Triceps brachii N None None None None N N N N   R. Extensor digitorum communis N None None None None N N N N   R. First dorsal interosseous N None None None None N N N N   R. Abductor pollicis brevis N None None None None N N N N            Findings:      Nerve conduction studies:   Right median motor response was borderline due to borderline conduction velocity with normal distal motor latency and amplitude; F wave response was prolonged.   Left median motor response was borderline due to borderline conduction velocity with normal distal motor latency and amplitude; F wave response was prolonged.   Right ulnar motor response was normal; F wave response was prolonged.  Left ulnar motor response was normal; F wave response was prolonged.   Right median sensory response was abnormal due to prolonged peak latency with reduced amplitude and slowed conduction velocity.   Left median sensory response was abnormal due to prolonged peak latency with reduced amplitude and slowed conduction velocity.   Right ulnar sensory response was borderline due to borderline prolonged peak latency, with borderline amplitude and borderline conduction velocity.   Left ulnar sensory response was borderline due to borderline prolonged peak latency, with borderline amplitude and borderline conduction velocity.   Right radial sensory response was normal.   Left radial sensory response was normal.   Right median to ulnar palmar mixed nerve comparison study was abnormal due to significant prolongation of median relative to ulnar nerve greater than 0.4 msec (~0.68 msec), consistent with focal slowing across the wrist.   Left median to ulnar palmar mixed nerve comparison study was normal.      EMG (needle exam):   Concentric needle EMG was performed  on the selected muscles listed above in the table with the following findings: all muscles tested were normal.      Impression:   This is an abnormal study. 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  This is demyelinating type with sensory involvement.  In addition, there is suggestion for mild sensory neuropathy, which is primary axonal.  There is no suggestion for a primary demyelinating neuropathy or myopathy.       Prior as noted below:     NCS /EMG (8/9/2023):     Sensory NCS      Nerve / Sites Rec. Site Onset Lat Peak Lat NP Amp PP Amp Segments Distance Velocity Comment       ms ms µV µV   cm m/s     R Sural - (Antidromic)      Calf Ankle 3.18 4.11 4.6 6.0 Calf - Ankle 14 44        2 Ankle                   L Sural - (Antidromic)      Calf Ankle NR NR NR NR Calf - Ankle 14 NR         Motor NCS      Nerve / Sites Muscle Latency Amplitude Segments Dist. Lat Diff Velocity Comments       ms mV   cm ms m/s     R Peroneal - EDB      Ankle EDB 5.73 1.3 Ankle - EDB 8            B. Fib Head EDB 15.58 1.2 B. Fib Head - Ankle 35 9.85 35.5     L Peroneal - EDB      Ankle EDB 5.71 1.2 Ankle - EDB 8            B. Fib Head EDB 15.67 1.0 B. Fib Head - Ankle 35 9.96 35.1     R Tibial - AH      Ankle AH 5.08 0.9 Ankle - AH 8            Knee AH 17.33 0.5 Knee - Ankle 44 12.25 35.9     L Tibial - AH      Ankle AH 5.56 1.0 Ankle - AH 8            Knee AH 17.10 0.6 Knee - Ankle 43.5 11.54 37.7         F  Wave      Nerve M Latency F Latency F-M Lat     ms ms ms   R Peroneal - EDB 6.4 51.6 45.2   R Tibial - AH 11.9 70.7 58.8   L Tibial - AH 6.2 71.4 65.2   L Peroneal - EDB 6.5 67.3 60.8                             EMG Summary Table       Spontaneous MUAP Recruitment   Muscle IA Fib PSW Fasc H.F. Amp Dur. PPP Pattern   R. Vastus medialis N None None None None N N N N   L. Vastus medialis N None None None None N N N N   R. Peroneus longus N None None None None N N N N    L. Peroneus longus N None None None None N N N N   R. Tibialis anterior N None None None None N N N N   L. Tibialis anterior N None None None None N N N N   R. Gastrocnemius N None None None None N N N N   L. Gastrocnemius N None None None None N N N N   R. Extensor hallucis longus N None None None None N N N N   L. Extensor hallucis longus N None None None None N N N N            Summary:      Nerve conduction studies:  Right sural sensory response was borderline due to borderline amplitude with normal peak latency and normal conduction velocity.  Left sural sensory response was absent.   Right common peroneal motor response was abnormal due to reduced amplitude, with normal distal motor latency, and mildly slowed conduction velocity; F wave response was normal.  Left common peroneal motor response was abnormal due to reduced amplitude, with normal distal motor latency, and mildly slowed conduction velocity; F-wave response was mildly prolonged.  Right tibial motor response was abnormal due to reduced amplitude, with normal distal motor latency, and mildly slowed conduction velocity; F-wave response was prolonged; in addition, there was suggestion for temporal dispersion on proximal stimulation.  Left tibial motor response was abnormal due to reduced amplitude, with normal distal motor latency, and mildly slowed conduction velocity; F-wave response was prolonged; in addition, there was suggestion for temporal dispersion on proximal stimulation.     EMG (needle exam):  Concentric needle EMG was performed on the selected muscles listed above in the table, with the following findings: All muscles tested were normal     Impression:  This is an abnormal study.  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    There was no suggestion for myopathic units or acute denervation.  It should be noted that the testing was  somewhat technically difficult due to patient's body habitus.      Clinical comment:   On the basis of this limited study, there are some findings of asymmetry, with worsening in the left lower extremity in terms of the sural sensory response.  In addition, there is some suggestion for temporal dispersion in the right tibial motor response on proximal stimulation as well as in the left tibial motor response. While not meeting criteria for chronic inflammatory demyelinating polyneuropathy (CIDP), or a primary demyelinating neuropathy, the presence of asymmetric features and some uniformly prolonged F waves raises this possibility; alternatively, this may be due to technical factors related to patient's body habitus.  Additional testing with EMG of the upper extremities may provide additional information regarding the etiology of this likely chronic neuropathy.          Impression/ Plan:  Wil Llanos is a 75 year old male who originally presented 8/24/2023 for evaluation of multiple complaints.  He started to have cramping in both legs in the past 6 months.  He also had numbness in toes 3 yrs ago.  He states he intermittently is having \"locking up\" of hands with thumb moving inward on R or L side - states this has been in the past few weeks.       Neurologic exam showed decreased sensation in both feet with depressed DTRs distally as well as reduced sensation in UE to vibration. In addition, there was some weakness in APB muscles in UE bilaterally. Signs/symptoms are overall concerning for neuropathy.  In addition, NCS/EMG done of both legs showed some asymmetry with absent sural sensory response on left side and present but reduced on R side. Tibial motor response showed bilateral temporal dispersion proximally and this could suggest an atypical neuropathy or be due to artifact.      In order to further evaluate, NCS/EMG of both upper extremities was done and showed evidence for sensory neuropathy with axonal  and demyelinating features along with median entrapment neuropathy across the right wrist, suggestive of carpal tunnel syndrome, which is mild.  Labs were done and showed mild elevation in kappa free light chain but normal ratio and borderline B12 level with normal ESR, CRP, RF.  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       He was on IVIG but had multiple knee surgery revisions and infections and has not been doing IVIG since he has been on antibiotics - he has been on IV antiobiotics since 12/26/2024 but now only on doxycycline; last course of IVIG was 11/2024.       He has some numbness in both feet; spasm in hands and legs is improved on gabapentin 900 mg tid; left arm does not feels as strong since being off IVIG and now having blurry vision recently - exam appears worse and recommend resume IVIG monthly infusions. In addition, will continue gabapentin at 900 mg tid.     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 Neuropathy  As noted above   - gabapentin 300 MG Oral Cap; TAKE 3 CAPSULES BY MOUTH IN THE MORNING, AT NOON AND AT BEDTIME  Dispense: 810 capsule; Refill: 2    Return in about 3 months (around 5/28/2025).    Mode Cox MD, Neurology  [facility]  Pager [phone]  2/28/2025

## 2025-02-28 NOTE — PATIENT INSTRUCTIONS
Refill policies:    Allow 2-3 business days for refills; controlled substances may take longer.  Contact your pharmacy at least 5 days prior to running out of medication and have them send an electronic request or submit request through the “request refill” option in your [facility] account.  Refills are not addressed on weekends; covering physicians do not authorize routine medications on weekends.  No narcotics or controlled substances are refilled after noon on Fridays or by on call physicians.  By law, narcotics must be electronically prescribed.  A 30 day supply with no refills is the maximum allowed.  If your prescription is due for a refill, you may be due for a follow up appointment.  To best provide you care, patients receiving routine medications need to be seen at least once a year.  Patients receiving narcotic/controlled substance medications need to be seen at least once every 3 months.  In the event that your preferred pharmacy does not have the requested medication in stock (e.g. Backordered), it is your responsibility to find another pharmacy that has the requested medication available.  We will gladly send a new prescription to that pharmacy at your request.    Scheduling Tests:    If your physician has ordered radiology tests such as MRI or CT scans, please contact Central Scheduling at [phone] right away to schedule the test.  Once scheduled, the [facility] Centralized Referral Team will work with your insurance carrier to obtain pre-certification or prior authorization.  Depending on your insurance carrier, approval may take 3-10 days.  It is highly recommended patients assure they have received an authorization before having a test performed.  If test is done without insurance authorization, patient may be responsible for the entire amount billed.      Precertification and Prior Authorizations:  If your physician has recommended that you have a procedure or additional testing performed the [facility]  Centralized Referral Team will contact your insurance carrier to obtain pre-certification or prior authorization.    You are strongly encouraged to contact your insurance carrier to verify that your procedure/test has been approved and is a COVERED benefit.  Although the [facility] Centralized Referral Team does its due diligence, the insurance carrier gives the disclaimer that \"Although the procedure is authorized, this does not guarantee payment.\"    Ultimately the patient is responsible for payment.   Thank you for your understanding in this matter.  Paperwork Completion:  If you require FMLA or disability paperwork for your recovery, please make sure to either drop it off or have it faxed to our office at [phone]. Be sure the form has your name and date of birth on it.  The form will be faxed to our Forms Department and they will complete it for you.  There is a 25$ fee for all forms that need to be filled out.  Please be aware there is a 10-14 day turnaround time.  You will need to sign a release of information (NAMITA) form if your paperwork does not come with one.  You may call the Forms Department with any questions at [phone].  Their fax number is [phone].

## 2025-03-07 ENCOUNTER — OFFICE VISIT (OUTPATIENT)
Age: 75
End: 2025-03-07
Attending: Other
Payer: MEDICARE

## 2025-03-07 VITALS
DIASTOLIC BLOOD PRESSURE: 60 MMHG | OXYGEN SATURATION: 57 % | HEART RATE: 84 BPM | BODY MASS INDEX: 34.84 KG/M2 | TEMPERATURE: 98 F | WEIGHT: 271.5 LBS | HEIGHT: 74 IN | SYSTOLIC BLOOD PRESSURE: 121 MMHG

## 2025-03-07 DIAGNOSIS — G61.89 POLYNEUROPATHY ASSOCIATED WITH MONOCLONAL IGM ANTIBODIES TO MYELIN-ASSOCIATED GLYCOPROTEIN (HCC): Primary | ICD-10-CM

## 2025-03-07 RX ORDER — DIPHENHYDRAMINE HCL 25 MG
25 CAPSULE ORAL ONCE
Status: COMPLETED | OUTPATIENT
Start: 2025-03-07 | End: 2025-03-07

## 2025-03-07 RX ORDER — DIPHENHYDRAMINE HYDROCHLORIDE 50 MG/ML
25 INJECTION INTRAMUSCULAR; INTRAVENOUS ONCE
OUTPATIENT
Start: 2025-03-21

## 2025-03-07 RX ORDER — ACETAMINOPHEN 325 MG/1
650 TABLET ORAL ONCE
Status: COMPLETED | OUTPATIENT
Start: 2025-03-07 | End: 2025-03-07

## 2025-03-07 RX ORDER — ACETAMINOPHEN 325 MG/1
650 TABLET ORAL ONCE
OUTPATIENT
Start: 2025-03-21

## 2025-03-07 RX ORDER — DIPHENHYDRAMINE HCL 25 MG
25 CAPSULE ORAL ONCE
OUTPATIENT
Start: 2025-03-21

## 2025-03-07 RX ADMIN — DIPHENHYDRAMINE HCL 25 MG: 25 MG CAPSULE ORAL at 09:08:00

## 2025-03-07 RX ADMIN — ACETAMINOPHEN 650 MG: 325 TABLET ORAL at 09:08:00

## 2025-03-07 NOTE — PROGRESS NOTES
Pt here for IVIG. Pt denies any issues or concerns.      Ordering Provider: Kenny Machado Exp: 2/12 doses remaining     Pt tolerated infusion without difficulty or complaint. Reviewed next appt date/time. Pt discharged home in stable condition.      Education Record  Learner:  Patient  Disease / Diagnosis: Polyneuropathy  Barriers / Limitations:  None  Method:  Discussion  General Topics:  Plan of care reviewed  Outcome:  Shows understanding

## 2025-03-10 ENCOUNTER — TELEPHONE (OUTPATIENT)
Age: 75
End: 2025-03-10

## 2025-03-10 NOTE — TELEPHONE ENCOUNTER
Pt called to reschedule his 5/2/25 appt to possibly 4/25/25 in the morning    Please give the pt a call back

## 2025-03-10 NOTE — TELEPHONE ENCOUNTER
Patient stated was advised by [facility] to call Dr. Sow office to change IVIG orders to one week earlier form 5/2/25 to 4/25/25.    Please call patient when orders changed.

## 2025-03-11 ENCOUNTER — TELEPHONE (OUTPATIENT)
Age: 75
End: 2025-03-11

## 2025-03-11 NOTE — TELEPHONE ENCOUNTER
Received call from Aisha at [facility] per Dr. Cox for May infusion to be given 1 week early on 4/25. Pt aware and rescheduled.

## 2025-03-11 NOTE — TELEPHONE ENCOUNTER
Per Dr Cox ok to schedule IVIG on 4/25/2025 which will be a three week interval.  Spoke to  [facility] Minda to inform. She will call patient to schedule.

## 2025-03-25 ENCOUNTER — OFFICE VISIT (OUTPATIENT)
Dept: INTERNAL MEDICINE CLINIC | Facility: CLINIC | Age: 75
End: 2025-03-25
Payer: MEDICARE

## 2025-03-25 VITALS
WEIGHT: 276 LBS | SYSTOLIC BLOOD PRESSURE: 134 MMHG | OXYGEN SATURATION: 94 % | HEART RATE: 54 BPM | HEIGHT: 74 IN | BODY MASS INDEX: 35.42 KG/M2 | RESPIRATION RATE: 16 BRPM | DIASTOLIC BLOOD PRESSURE: 78 MMHG | TEMPERATURE: 98 F

## 2025-03-25 DIAGNOSIS — N63.24 MASS OF LOWER INNER QUADRANT OF LEFT BREAST: Primary | ICD-10-CM

## 2025-03-25 PROCEDURE — 99214 OFFICE O/P EST MOD 30 MIN: CPT | Performed by: INTERNAL MEDICINE

## 2025-03-25 NOTE — PROGRESS NOTES
Subjective:   Patient ID: Wil Llanos is a 75 year old male.    Breast Lump  This is a new problem. Episode onset: 2 weeks. The problem has been unchanged. Pertinent negatives include no chills, fatigue, fever, myalgias, swollen glands or weakness. Associated symptoms comments: Denies nipple discharge or inversion  Denies wt change. Nothing aggravates the symptoms. He has tried nothing for the symptoms.     Pt noticed mass after self palpation  History/Other:   Review of Systems   Constitutional:  Negative for chills, fatigue and fever.   Musculoskeletal:  Negative for myalgias.   Neurological:  Negative for weakness.   All other systems reviewed and are negative.    Current Outpatient Medications   Medication Sig Dispense Refill    Doxycycline Hyclate 100 MG Oral Tab Take 1 tablet (100 mg total) by mouth daily.      HYDROcodone-acetaminophen 5-325 MG Oral Tab Take 1 tablet by mouth every 6 (six) hours as needed.      Zaleplon 5 MG Oral Cap Take 1 capsule (5 mg total) by mouth nightly.      diphenhydrAMINE 25 MG Oral Cap Take 1 capsule (25 mg total) by mouth every 6 (six) hours as needed for Itching.      gabapentin 300 MG Oral Cap TAKE 3 CAPSULES BY MOUTH IN THE MORNING, AT NOON AND AT BEDTIME 810 capsule 2    docusate sodium 100 MG Oral Cap Take 1 capsule (100 mg total) by mouth 2 (two) times daily.      atorvastatin 40 MG Oral Tab Take 1 tablet (40 mg total) by mouth nightly. 90 tablet 0    celecoxib 200 MG Oral Cap Take 1 capsule (200 mg total) by mouth as needed for Pain.      cetirizine 10 MG Oral Tab Take 1 tablet (10 mg total) by mouth daily.      Coenzyme Q10-Vitamin E (QUNOL ULTRA COQ10 OR) Take 100 mg by mouth daily.      hydrALAZINE 50 MG Oral Tab Take 1 tablet (50 mg total) by mouth every morning. Take with 100mg in AM      labetalol 200 MG Oral Tab Take 1 tablet (200 mg total) by mouth 2 (two) times daily.      hydrALAZINE 100 MG Oral Tab Take 1 tablet (100 mg total) by mouth 3 (three) times  daily.      torsemide 20 MG Oral Tab Take 1 tablet (20 mg total) by mouth in the morning and 1 tablet (20 mg total) before bedtime.      spironolactone 25 MG Oral Tab Take 1 tablet (25 mg total) by mouth daily.      apixaban 5 MG Oral Tab Take by mouth 2 (two) times daily.      multiple vitamin Oral Chew Tab Chew 1 tablet by mouth daily.       Allergies:Allergies[1]    Objective:   Physical Exam  Vitals and nursing note reviewed.   Constitutional:       Appearance: Normal appearance.   HENT:      Head: Normocephalic and atraumatic.   Cardiovascular:      Rate and Rhythm: Regular rhythm.      Heart sounds: Normal heart sounds.   Pulmonary:      Effort: Pulmonary effort is normal.      Breath sounds: Normal breath sounds.   Chest:   Breasts:     Breasts are symmetrical.      Right: Absent.      Left: Mass (3 cm , non tender , mobile mass left lateral of nipple) present. No bleeding, inverted nipple, nipple discharge, skin change or tenderness.   Lymphadenopathy:      Cervical: No cervical adenopathy.      Upper Body:      Right upper body: No supraclavicular or axillary adenopathy.      Left upper body: No supraclavicular or axillary adenopathy.   Neurological:      Mental Status: He is alert.         Assessment & Plan:   1. Mass of lower inner quadrant of left breast      - check diagnostic mammogram and US of left breast  No orders of the defined types were placed in this encounter.      Meds This Visit:  Requested Prescriptions      No prescriptions requested or ordered in this encounter       Imaging & Referrals:  [facility] 2D+3D DIAGNOSTIC ADDL VWS LEFT ([id number]=[id number])         [1]   Allergies  Allergen Reactions    Amlodipine SWELLING    Hydromorphone ITCHING    Penicillins SWELLING    Rivaroxaban HIVES, ITCHING and RASH     Hives on legs    Vancomycin HALLUCINATION, ITCHING, OTHER (SEE COMMENTS), RASH and UNKNOWN     Elevated temperature    Hydralazine ITCHING     Red tablet pt is allergic to, takes orange tab  is okay no problems      Losartan FACE FLUSHING    Ace Inhibitors Coughing    Lisinopril RASH    Morphine ITCHING    Oxycodone ITCHING

## 2025-03-26 ENCOUNTER — TELEPHONE (OUTPATIENT)
Dept: INTERNAL MEDICINE CLINIC | Facility: CLINIC | Age: 75
End: 2025-03-26

## 2025-03-26 DIAGNOSIS — N63.24 MASS OF LOWER INNER QUADRANT OF LEFT BREAST: Primary | ICD-10-CM

## 2025-03-26 NOTE — TELEPHONE ENCOUNTER
Spoke with Radha, requested to place bilateral diagnostic mammogram orders instead  Orders placed

## 2025-03-30 ENCOUNTER — PATIENT MESSAGE (OUTPATIENT)
Dept: INTERNAL MEDICINE CLINIC | Facility: CLINIC | Age: 75
End: 2025-03-30

## 2025-03-31 NOTE — TELEPHONE ENCOUNTER
Patient given urology referral and [facility]ner[facility] recommendations. Patient interested in alternative medication. Will discuss with Dr. John on Monday 4/7 when provider returns to office.

## 2025-04-04 ENCOUNTER — OFFICE VISIT (OUTPATIENT)
Age: 75
End: 2025-04-04
Attending: Other
Payer: MEDICARE

## 2025-04-04 VITALS
WEIGHT: 284.5 LBS | BODY MASS INDEX: 36.51 KG/M2 | RESPIRATION RATE: 18 BRPM | OXYGEN SATURATION: 94 % | DIASTOLIC BLOOD PRESSURE: 67 MMHG | HEIGHT: 74.02 IN | SYSTOLIC BLOOD PRESSURE: 133 MMHG | HEART RATE: 51 BPM | TEMPERATURE: 98 F

## 2025-04-04 DIAGNOSIS — G61.89 POLYNEUROPATHY ASSOCIATED WITH MONOCLONAL IGM ANTIBODIES TO MYELIN-ASSOCIATED GLYCOPROTEIN (HCC): Primary | ICD-10-CM

## 2025-04-04 RX ORDER — ACETAMINOPHEN 325 MG/1
650 TABLET ORAL ONCE
OUTPATIENT
Start: 2025-04-25

## 2025-04-04 RX ORDER — ACETAMINOPHEN 325 MG/1
650 TABLET ORAL ONCE
Status: COMPLETED | OUTPATIENT
Start: 2025-04-04 | End: 2025-04-04

## 2025-04-04 RX ORDER — DIPHENHYDRAMINE HCL 25 MG
25 CAPSULE ORAL ONCE
Status: COMPLETED | OUTPATIENT
Start: 2025-04-04 | End: 2025-04-04

## 2025-04-04 RX ORDER — DIPHENHYDRAMINE HCL 25 MG
25 CAPSULE ORAL ONCE
OUTPATIENT
Start: 2025-04-25

## 2025-04-04 RX ORDER — DIPHENHYDRAMINE HYDROCHLORIDE 50 MG/ML
25 INJECTION, SOLUTION INTRAMUSCULAR; INTRAVENOUS ONCE
OUTPATIENT
Start: 2025-04-25

## 2025-04-04 RX ADMIN — DIPHENHYDRAMINE HCL 25 MG: 25 MG CAPSULE ORAL at 09:28:00

## 2025-04-04 RX ADMIN — ACETAMINOPHEN 650 MG: 325 TABLET ORAL at 09:27:00

## 2025-04-07 NOTE — TELEPHONE ENCOUNTER
Discussed with Dr. John - would advise patient see urology to discuss alternative recommendations.

## 2025-04-08 ENCOUNTER — HOSPITAL ENCOUNTER (OUTPATIENT)
Dept: MAMMOGRAPHY | Facility: HOSPITAL | Age: 75
Discharge: HOME | End: 2025-04-08
Attending: INTERNAL MEDICINE
Payer: MEDICARE

## 2025-04-08 DIAGNOSIS — N63.24 MASS OF LOWER INNER QUADRANT OF LEFT BREAST: ICD-10-CM

## 2025-04-08 PROCEDURE — 77066 DX MAMMO INCL CAD BI: CPT | Performed by: INTERNAL MEDICINE

## 2025-04-08 PROCEDURE — 77062 BREAST TOMOSYNTHESIS BI: CPT | Performed by: INTERNAL MEDICINE

## 2025-04-14 ENCOUNTER — APPOINTMENT (OUTPATIENT)
Age: 75
End: 2025-04-14
Attending: Other
Payer: MEDICARE

## 2025-04-15 ENCOUNTER — LAB ENCOUNTER (OUTPATIENT)
Dept: LAB | Age: 75
End: 2025-04-15
Payer: MEDICARE

## 2025-04-15 DIAGNOSIS — Z96.651 PRESENCE OF RIGHT ARTIFICIAL KNEE JOINT: Primary | ICD-10-CM

## 2025-04-15 LAB
CRP SERPL-MCNC: <0.4 MG/DL (ref ?–0.5)
ERYTHROCYTE [SEDIMENTATION RATE] IN BLOOD: 19 MM/HR (ref 0–20)

## 2025-04-15 PROCEDURE — 85652 RBC SED RATE AUTOMATED: CPT

## 2025-04-15 PROCEDURE — 86140 C-REACTIVE PROTEIN: CPT

## 2025-04-15 PROCEDURE — 36415 COLL VENOUS BLD VENIPUNCTURE: CPT

## 2025-04-25 ENCOUNTER — OFFICE VISIT (OUTPATIENT)
Age: 75
End: 2025-04-25
Attending: Other
Payer: MEDICARE

## 2025-04-25 VITALS
HEART RATE: 45 BPM | SYSTOLIC BLOOD PRESSURE: 127 MMHG | WEIGHT: 278 LBS | OXYGEN SATURATION: 94 % | RESPIRATION RATE: 18 BRPM | DIASTOLIC BLOOD PRESSURE: 52 MMHG | BODY MASS INDEX: 35.68 KG/M2 | HEIGHT: 74.02 IN | TEMPERATURE: 97 F

## 2025-04-25 DIAGNOSIS — G61.89 POLYNEUROPATHY ASSOCIATED WITH MONOCLONAL IGM ANTIBODIES TO MYELIN-ASSOCIATED GLYCOPROTEIN (HCC): Primary | ICD-10-CM

## 2025-04-25 RX ORDER — DIPHENHYDRAMINE HCL 25 MG
25 CAPSULE ORAL ONCE
Status: COMPLETED | OUTPATIENT
Start: 2025-04-25 | End: 2025-04-25

## 2025-04-25 RX ORDER — DIPHENHYDRAMINE HYDROCHLORIDE 50 MG/ML
25 INJECTION, SOLUTION INTRAMUSCULAR; INTRAVENOUS ONCE
Status: CANCELLED | OUTPATIENT
Start: 2025-05-02

## 2025-04-25 RX ORDER — ACETAMINOPHEN 325 MG/1
650 TABLET ORAL ONCE
Status: CANCELLED | OUTPATIENT
Start: 2025-05-02

## 2025-04-25 RX ORDER — ACETAMINOPHEN 325 MG/1
650 TABLET ORAL ONCE
Status: COMPLETED | OUTPATIENT
Start: 2025-04-25 | End: 2025-04-25

## 2025-04-25 RX ORDER — DIPHENHYDRAMINE HCL 25 MG
25 CAPSULE ORAL ONCE
Status: CANCELLED | OUTPATIENT
Start: 2025-05-02

## 2025-04-25 RX ADMIN — DIPHENHYDRAMINE HCL 25 MG: 25 MG CAPSULE ORAL at 09:12:00

## 2025-04-25 RX ADMIN — ACETAMINOPHEN 650 MG: 325 TABLET ORAL at 09:12:00

## 2025-04-25 NOTE — PROGRESS NOTES
Education Record    Learner:  Patient    Disease / Diagnosis: here for IVIG    Barriers / Limitations:  None    Method:  Brief focused,  and  reinforcement    General Topics:  Plan of care reviewed    Outcome: patient ambulatory. No complaints. Aware today is the last of his order. Tolerated infusion. Shows understanding. Discharged in stable condition

## 2025-04-28 ENCOUNTER — TELEPHONE (OUTPATIENT)
Dept: NEUROLOGY | Facility: CLINIC | Age: 75
End: 2025-04-28

## 2025-04-28 DIAGNOSIS — G62.9 NEUROPATHY: ICD-10-CM

## 2025-04-28 RX ORDER — GABAPENTIN 300 MG/1
CAPSULE ORAL
Qty: 810 CAPSULE | Refills: 2 | Status: CANCELLED | OUTPATIENT
Start: 2025-04-28

## 2025-04-28 NOTE — TELEPHONE ENCOUNTER
Medication: gabapentin 300 mg     Date of last refill: 2/28/2025 # 810/2  [place]      Last office visit: 02/28/2025  Due back to clinic per last office note:    Date next office visit scheduled:    Future Appointments   Date Time Provider Department Center   5/6/2025  7:00 AM [facility]   6/4/2025 10:40 AM Mode Cox MD [facility] [place]   7/15/2025 10:30 AM Hayder John MD [facility] [address]           Last OV note recommendation:    Impression/ Plan:  Wil Llanos is a 75 year old male who originally presented 8/24/2023 for evaluation of multiple complaints.  He started to have cramping in both legs in the past 6 months.  He also had numbness in toes 3 yrs ago.  He states he intermittently is having \"locking up\" of hands with thumb moving inward on R or L side - states this has been in the past few weeks.       Neurologic exam showed decreased sensation in both feet with depressed DTRs distally as well as reduced sensation in UE to vibration. In addition, there was some weakness in APB muscles in UE bilaterally. Signs/symptoms are overall concerning for neuropathy.  In addition, NCS/EMG done of both legs showed some asymmetry with absent sural sensory response on left side and present but reduced on R side. Tibial motor response showed bilateral temporal dispersion proximally and this could suggest an atypical neuropathy or be due to artifact.       In order to further evaluate, NCS/EMG of both upper extremities was done and showed evidence for sensory neuropathy with axonal and demyelinating features along with median entrapment neuropathy across the right wrist, suggestive of carpal tunnel syndrome, which is mild.  Labs were done and showed mild elevation in kappa free light chain but normal ratio and borderline B12 level with normal ESR, CRP, RF.  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        He was on IVIG but had multiple knee surgery revisions and infections and has not been doing IVIG since he has been on antibiotics - he has been on IV antiobiotics since 12/26/2024 but now only on doxycycline; last course of IVIG was 11/2024.        He has some numbness in both feet; spasm in hands and legs is improved on gabapentin 900 mg tid; left arm does not feels as strong since being off IVIG and now having blurry vision recently - exam appears worse and recommend resume IVIG monthly infusions. In addition, will continue gabapentin at 900 mg tid.      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 Neuropathy  As noted above   - gabapentin 300 MG Oral Cap; TAKE 3 CAPSULES BY MOUTH IN THE MORNING, AT NOON AND AT BEDTIME  Dispense: 810 capsule; Refill: 2     Return in about 3 months (around 5/28/2025).

## 2025-05-01 ENCOUNTER — PATIENT MESSAGE (OUTPATIENT)
Dept: INTERNAL MEDICINE CLINIC | Facility: CLINIC | Age: 75
End: 2025-05-01

## 2025-05-02 ENCOUNTER — APPOINTMENT (OUTPATIENT)
Age: 75
End: 2025-05-02
Attending: Other
Payer: MEDICARE

## 2025-05-22 NOTE — PROGRESS NOTES
Wil Llanos is a 75 year old year old male who presents for a pre-operative physical exam.  Patient is to have right total knee arthroplasty, to be done by Dr. Tovar at [facility] on 06/10/2025.    HPI:    75 y.o. male with hx of Afib, pulmonary HTN, CKD, HTN, hyperlipidemia, KASH needs medical clearance for surgery.   With right knee arthritis now due to have a fourth right knee replacement surgery. Per patient the hardware is loose causing pain therefore proceeding with the 4th revision.  Functional capacity: the patient denies chest pain or shortness of breath with activity  States he is able to walk up 2 flights of stairs carrying heavy groceries without difficulty, shortness of breath, or chest pain.  On review of his chart, he has had no recent coronary evaluation. Today, he has no complaints.  Medications: Medications - Current[1]  Allergies: Allergies[2]  PMH:  has a past medical history of AAA (abdominal aortic aneurysm), Allergic rhinitis, Anesthesia complication, Arrhythmia, Cataract, Complex sleep apnea syndrome (2016), Diverticulosis of large intestine, Essential hypertension, High blood pressure, Osteoarthritis, Polyneuropathy, Sleep apnea, and Umbilical hernia without obstruction or gangrene.    He has no past medical history of Diabetes (HCC), Difficult intubation, Malignant hyperthermia, PONV (postoperative nausea and vomiting), or Pseudocholinesterase deficiency.  Surgical Hx:  has a past surgical history that includes tonsillectomy; total hip replacement (Left); arthroscopy of joint unlisted (Right); other; and other.  Family Hx: family history includes Colon Polyps in his self; Heart Disorder in his father; Other in his mother.  Social Hx:   Social History     Socioeconomic History    Marital status:      Spouse name: Not on file    Number of children: Not on file    Years of education: Not on file    Highest education level: Not on file   Occupational History    Not on file    Tobacco Use    Smoking status: Former     Current packs/day: 0.00     Average packs/day: 1 pack/day for 20.0 years (20.0 ttl pk-yrs)     Types: Cigarettes     Start date: 1981     Quit date: 2001     Years since quittin.8     Passive exposure: Never    Smokeless tobacco: Never    Tobacco comments:     Quit 20+ years ago per pt   Vaping Use    Vaping status: Never Used   Substance and Sexual Activity    Alcohol use: Yes     Comment: OCC    Drug use: Never    Sexual activity: Not on file   Other Topics Concern     Service Not Asked    Blood Transfusions Not Asked    Caffeine Concern Yes     Comment: 1 cup a day    Occupational Exposure Not Asked    Hobby Hazards Not Asked    Sleep Concern Not Asked    Stress Concern Not Asked    Weight Concern Not Asked    Special Diet Not Asked    Back Care Not Asked    Exercise No    Bike Helmet Not Asked    Seat Belt Not Asked    Self-Exams Not Asked   Social History Narrative    Not on file     Social Drivers of Health     Food Insecurity: Not on file   Transportation Needs: Not on file   Stress: Not on file   Housing Stability: Not on file     REVIEW OF SYSTEMS:    GENERAL: feels well otherwise  SKIN: denies any unusual skin lesions  EYES:denies blurred vision or double vision  HEENT: denies nasal congestion, sinus pain or sore throat  LUNGS: denies shortness of breath with exertion. Denies history of  COPD, +KASH- compliant with CPAP  CARDIOVASCULAR: denies chest pain on exertion  GI: denies abdominal pain, denies heartburn  : denies dysuria, denies nocturia or changes in stream  MUSCULOSKELETAL: denies back pain, +chronic right knee pain  NEURO: denies headaches, dizziness, fainting  PSYCH: denies depression or anxiety  HEMATOLOGIC: denies hx of anemia, bleeding or clotting disorders  ENDOCRINE: denies thyroid history or diabetes  ALL/ASTHMA: denies hx of allergy or asthma. Denies hx of adverse reaction to anesthesia or analgesics.   EXAM:    /60    Pulse (!) 45   Temp 97.2 °F (36.2 °C) (Temporal)   Resp 18   Ht 6' 2.02\" (1.88 m)   Wt 274 lb (124.3 kg)   SpO2 97%   BMI 35.16 kg/m²   GENERAL: well developed, well nourished, in no apparent distress  SKIN: no rashes, no suspicious lesions.  Warm and dry.  HEENT: atraumatic, normocephalic, ears and throat are clear  EYES: PERRLA, EOMI, normal optic disk, conjunctiva are clear  NECK: supple, no adenopathy, no bruits b/l  CHEST: no chest tenderness  LUNGS: clear to auscultation b/l  CARDIO: RRR without murmur  GI: good BS's, no masses, HSM or tenderness  MUSCULOSKELETAL: No CVA tenderness, FROM of the back  EXTREMITIES: no cyanosis, clubbing or edema  NEURO: Oriented times three, cranial nerves are intact, motor and sensory are grossly intact. DTRs +2 and symmetric b/l.    ASSESSMENT AND PLAN:    Wil Llanos is a 75 year old male who presents for cardiac risk assessment prior to his right total knee arthroplasty, to be done by Dr. Tovar at [facility] on 06/10/2025.    Pt has the following conditions: Problem List[3]   Pt has no significant history of cardiac or pulmonary conditions.      Pt was cleared by cardiology for surgery.  Per cardiology, ok to hold Eliquis 5 days before the surgery and resume 3 days after.   Per cardiology will hold Spironolactone the day of surgery.  Per surgeon's request pt will be holding Celebrex 10 days prior to OR.  Pt states he has labs ordered by his surgeon.    The patient has a perioperative risk of major adverse cardiac event that is less than 1%.  he has no clinical markers and an exercise capacity of greater than 4 metabolic units.   Therefore according to ACC/AHA guidelines, he is therefore cleared for surgery with low cardiovascular risk.  Because of this, the patient is not a candidate for perioperative beta blockade.    This consult was sent back to the referring physician, Dr. Tovar.         [1]   Current Outpatient Medications:     clindamycin 300  MG Oral Cap, Take 150 mg by mouth 4 (four) times daily., Disp: , Rfl:     Immune Globulin, Human, (GAMMAGARD S/D LESS IGA) 5 g Intravenous Recon Soln, Gammagard S-D (IgA < 1 mcg/mL) 5 gram intravenous solution, [RxNorm: 2117113], Disp: , Rfl:     Doxycycline Hyclate 100 MG Oral Tab, Take 1 tablet (100 mg total) by mouth daily., Disp: , Rfl:     HYDROcodone-acetaminophen 5-325 MG Oral Tab, Take 1 tablet by mouth every 6 (six) hours as needed., Disp: , Rfl:     Zaleplon 5 MG Oral Cap, Take 1 capsule (5 mg total) by mouth nightly., Disp: , Rfl:     diphenhydrAMINE 25 MG Oral Cap, Take 1 capsule (25 mg total) by mouth every 6 (six) hours as needed for Itching., Disp: , Rfl:     gabapentin 300 MG Oral Cap, TAKE 3 CAPSULES BY MOUTH IN THE MORNING, AT NOON AND AT BEDTIME, Disp: 810 capsule, Rfl: 2    multiple vitamin Oral Chew Tab, Chew 1 tablet by mouth daily., Disp: , Rfl:     atorvastatin 40 MG Oral Tab, Take 1 tablet (40 mg total) by mouth nightly., Disp: 90 tablet, Rfl: 0    celecoxib 200 MG Oral Cap, Take 1 capsule (200 mg total) by mouth as needed for Pain., Disp: , Rfl:     cetirizine 10 MG Oral Tab, Take 1 tablet (10 mg total) by mouth daily., Disp: , Rfl:     Coenzyme Q10-Vitamin E (QUNOL ULTRA COQ10 OR), Take 100 mg by mouth daily., Disp: , Rfl:     hydrALAZINE 50 MG Oral Tab, Take 1 tablet (50 mg total) by mouth every morning. Take with 100mg in AM, Disp: , Rfl:     labetalol 200 MG Oral Tab, Take 1 tablet (200 mg total) by mouth 2 (two) times daily., Disp: , Rfl:     hydrALAZINE 100 MG Oral Tab, Take 1 tablet (100 mg total) by mouth 3 (three) times daily., Disp: , Rfl:     torsemide 20 MG Oral Tab, Take 1 tablet (20 mg total) by mouth in the morning and 1 tablet (20 mg total) before bedtime., Disp: , Rfl:     spironolactone 25 MG Oral Tab, Take 1 tablet (25 mg total) by mouth daily., Disp: , Rfl:     apixaban 5 MG Oral Tab, Take by mouth 2 (two) times daily., Disp: , Rfl:   [2]   Allergies  Allergen Reactions     Amlodipine SWELLING    Hydromorphone ITCHING    Penicillins SWELLING    Rivaroxaban HIVES, ITCHING and RASH     Hives on legs    Vancomycin HALLUCINATION, ITCHING, OTHER (SEE COMMENTS), RASH and UNKNOWN     Elevated temperature    Hydralazine ITCHING     Red tablet pt is allergic to, takes orange tab is okay no problems      Losartan FACE FLUSHING    Ace Inhibitors Coughing    Lisinopril RASH    Morphine ITCHING    Oxycodone ITCHING   [3]   Patient Active Problem List  Diagnosis    Ectatic abdominal aorta    Atherosclerosis of coronary artery    Essential hypertension    Hyperlipidemia    Obstructive sleep apnea syndrome    Paroxysmal atrial fibrillation (HCC)    Primary osteoarthritis of both knees    Subclinical hypothyroidism    Hepatitis C antibody test negative    Morbid (severe) obesity due to excess calories (HCC)    Pulmonary hypertension, primary (HCC)    Stage 3b chronic kidney disease (HCC)    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

## 2025-05-23 ENCOUNTER — OFFICE VISIT (OUTPATIENT)
Dept: INTERNAL MEDICINE CLINIC | Facility: CLINIC | Age: 75
End: 2025-05-23
Payer: MEDICARE

## 2025-05-23 VITALS
WEIGHT: 274 LBS | DIASTOLIC BLOOD PRESSURE: 60 MMHG | HEART RATE: 45 BPM | OXYGEN SATURATION: 97 % | SYSTOLIC BLOOD PRESSURE: 110 MMHG | RESPIRATION RATE: 18 BRPM | TEMPERATURE: 97 F | HEIGHT: 74.02 IN | BODY MASS INDEX: 35.16 KG/M2

## 2025-05-23 DIAGNOSIS — I48.0 PAROXYSMAL ATRIAL FIBRILLATION (HCC): ICD-10-CM

## 2025-05-23 DIAGNOSIS — G47.33 OBSTRUCTIVE SLEEP APNEA SYNDROME: ICD-10-CM

## 2025-05-23 DIAGNOSIS — N18.32 STAGE 3B CHRONIC KIDNEY DISEASE (HCC): ICD-10-CM

## 2025-05-23 DIAGNOSIS — E66.01 MORBID (SEVERE) OBESITY DUE TO EXCESS CALORIES (HCC): ICD-10-CM

## 2025-05-23 DIAGNOSIS — I10 ESSENTIAL HYPERTENSION: ICD-10-CM

## 2025-05-23 DIAGNOSIS — I27.0 PULMONARY HYPERTENSION, PRIMARY (HCC): ICD-10-CM

## 2025-05-23 DIAGNOSIS — E78.2 MIXED HYPERLIPIDEMIA: ICD-10-CM

## 2025-05-23 DIAGNOSIS — Z01.818 PREOP EXAM FOR INTERNAL MEDICINE: Primary | ICD-10-CM

## 2025-05-23 RX ORDER — IMMUNE GLOBULIN INTRAVENOUS (HUMAN) 5 G
KIT INTRAVENOUS
COMMUNITY
Start: 2025-05-21

## 2025-05-23 RX ORDER — CLINDAMYCIN HYDROCHLORIDE 300 MG/1
150 CAPSULE ORAL 4 TIMES DAILY
COMMUNITY
Start: 2025-04-17

## 2025-05-23 NOTE — TELEPHONE ENCOUNTER
Appt given   
Front office made aware to reach out to patient to assist with scheduling patient's pre op.  
no

## 2025-06-04 ENCOUNTER — OFFICE VISIT (OUTPATIENT)
Dept: NEUROLOGY | Facility: CLINIC | Age: 75
End: 2025-06-04
Payer: MEDICARE

## 2025-06-04 VITALS
RESPIRATION RATE: 16 BRPM | WEIGHT: 280 LBS | BODY MASS INDEX: 35.94 KG/M2 | HEIGHT: 74 IN | SYSTOLIC BLOOD PRESSURE: 128 MMHG | OXYGEN SATURATION: 98 % | DIASTOLIC BLOOD PRESSURE: 60 MMHG | HEART RATE: 45 BPM

## 2025-06-04 DIAGNOSIS — R76.8 ELEVATED SERUM IMMUNOGLOBULIN FREE LIGHT CHAIN LEVEL: ICD-10-CM

## 2025-06-04 DIAGNOSIS — G61.89 POLYNEUROPATHY ASSOCIATED WITH MONOCLONAL IGM ANTIBODIES TO MYELIN-ASSOCIATED GLYCOPROTEIN (HCC): Primary | ICD-10-CM

## 2025-06-04 PROCEDURE — 99213 OFFICE O/P EST LOW 20 MIN: CPT | Performed by: OTHER

## 2025-06-04 RX ORDER — TRAMADOL HYDROCHLORIDE 50 MG/1
TABLET ORAL
COMMUNITY
Start: 2025-06-02

## 2025-06-04 RX ORDER — ONDANSETRON 4 MG/1
4 TABLET, ORALLY DISINTEGRATING ORAL
COMMUNITY
Start: 2025-06-02

## 2025-06-04 RX ORDER — SENNOSIDES 8.6 MG/1
8.6 TABLET ORAL 2 TIMES DAILY
COMMUNITY
Start: 2025-06-02

## 2025-06-04 RX ORDER — ASPIRIN 81 MG/1
TABLET ORAL
COMMUNITY
Start: 2025-06-02

## 2025-06-04 RX ORDER — OXYCODONE HYDROCHLORIDE 5 MG/1
TABLET ORAL
COMMUNITY
Start: 2025-06-02

## 2025-06-04 RX ORDER — PANTOPRAZOLE SODIUM 40 MG/1
TABLET, DELAYED RELEASE ORAL
COMMUNITY
Start: 2025-06-02

## 2025-06-04 NOTE — PROGRESS NOTES
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 Pt still feels unsteady at times.       As per my initial H&P from 8/24/2023,   \" Wil Llanos is a 73 year old, who presents for evaluation of multiple complaints.  He started to have cramping in both legs in the past 6 months.  He also had numbness in toes 3 yrs ago.  He states he intermittently is having \"locking up\" of hands with thumb moving inward on R or L side - states this has been in the past few weeks.     He was recommended to have NCS/EMG of both arms.  He has some neck pain but not into the hands but sometimes has dropping of objects from hands.     He also has intermittent \"blackness\" in R eye for 5 minutes with no associated n/v, or headache or focal numbness/tingling/weakness or loss of awareness.      He has noted some pain in the legs below the knee and toe with \"curl upward\" at times. .       He has had EMG both arms 35 yrs ago and noted he was weak in left arm / hand on exam     Otherwise, patient denies any recent weight change, fevers, chills, nausea, double vision/ blurry vision / loss of vision, chest pain, palpitations, shortness of breath, rashes, joint pains, bowel / bladder incontinence or mood issues. \"        Prior notes as per 10/24/2023.  Patient since last visit has continued to have locking up of hands with thumb moveing inward on R or L side.  He is on gabapentin 600 mg AM / 300 mg PM / 600 mg nightly now with some improvement in pain at night; he notes improvement in curling toes - he feels he is \"wired, like he drank a lot of coffee\" since he has been on higher dose of gabapentin but otherwise has been tolerating; he feels he may be having breakthrough symptoms and tolerance          He admits he has had some tripping and falls on uneven ground but denies worsening.           Prior notes as per 12/12/2023.  Patient last seen 10/24/2023.  He is now on gabapentin at 600 mg tid - notes some cramping in both legs around the foot, toes curling at night and cramps in the back of the calf at night - notes he has cramping in legs when waking up at night - denies excessive sedation on gabapentin; has some swelling in ankles occasionally; he denies falls recently.  He also has locking up of hands intermittently.       Prior notes as per 4/12/2024.  Patient last seen 12/12/2023. He has had one session IVIG x5 days 2/28 to 3/1. He has been having cramping in legs waking him up at night and has locking up of hands as well; he had headache after IVIG but this persisted several days after infusion was done; no recent falls and now on gabapentin up to 900 mg tid.       Prior notes as per 8/14/2024.  Patient last seen 4/12/2024.  He has been having IVIG monthly infusions and has noted improvement in headaches he was having previously after IVIG infusion and also has improvement in burning sensation in both legs. He has noted improvement in locking up hands; he remains on gabapentin 900 mg tid and mainly has rare cramping in legs at night.        Prior notes as per 2/28/2025.  Patient last seen 8/14/2024.  He was on IVIG but had multiple knee surgery revisions and infections and has not been doing IVIG since he has been on antibiotics - he has been on IV antiobiotics since 12/26/2024 but now only on doxycycline; last course of IVIG was 11/2024.       He has some numbness in both feet; spasm in hands and legs is improved on gabapentin 900 mg tid; left arm does not feels as strong since being off IVIG and now having blurry vision recently.         Patient last seen 2/28/2025. He was advised to resume IVIG and had last session 4/25/2025. Of note, he is planning to have knee surgery on R knee 6/10/2025. He has  remained on gabapentin 900 mg tid.      He states he has only had spasms in the hands and legs when he over-exerts himself and denies worsening weakness; notes some worsening spasms in between IVIG infusion.     Past Medical History:    AAA (abdominal aortic aneurysm)    Allergic rhinitis    Anesthesia complication    Wakes up violent    Arrhythmia    afib    Cataract    Complex sleep apnea syndrome    Diverticulosis of large intestine    Essential hypertension    High blood pressure    Osteoarthritis    Polyneuropathy    Sleep apnea    Umbilical hernia without obstruction or gangrene     Past Surgical History:   Procedure Laterality Date    Arthroscopy of joint unlisted Right     Meniscectomy    Other      Nasal surgery    Other      Hemorrhoidectomy and fistula repair    Tonsillectomy      Total hip replacement Left      Family History   Problem Relation Age of Onset    Colon Polyps Self     Other (Other) Mother         Alzheimer's    Heart Disorder Father     Breast Cancer Neg     Ovarian Cancer Neg     Prostate Cancer Neg     Ashkenazi Yazidi Descent Neg      Social History     Socioeconomic History    Marital status:    Tobacco Use    Smoking status: Former     Current packs/day: 0.00     Average packs/day: 1 pack/day for 20.0 years (20.0 ttl pk-yrs)     Types: Cigarettes     Start date: 1981     Quit date: 2001     Years since quittin.8     Passive exposure: Never    Smokeless tobacco: Never    Tobacco comments:     Quit 20+ years ago per pt   Vaping Use    Vaping status: Never Used   Substance and Sexual Activity    Alcohol use: Yes     Comment: OCC    Drug use: Never   Other Topics Concern    Caffeine Concern Yes     Comment: 1 cup a day    Exercise No       Allergies   Allergen Reactions    Amlodipine SWELLING    Hydromorphone ITCHING    Penicillins SWELLING    Rivaroxaban HIVES, ITCHING and RASH     Hives on legs    Vancomycin HALLUCINATION, ITCHING, OTHER (SEE COMMENTS), RASH and  UNKNOWN     Elevated temperature    Hydralazine ITCHING     Red tablet pt is allergic to, takes orange tab is okay no problems      Losartan FACE FLUSHING    Ace Inhibitors Coughing    Lisinopril RASH    Morphine ITCHING    Oxycodone ITCHING         Current Outpatient Medications:     aspirin 81 MG Oral Tab EC, Take 1 tablet by mouth twice daily beginning post-op day 1 for 2 days. Resume Eliquis 6/13/25., Disp: , Rfl:     traMADol 50 MG Oral Tab, , Disp: , Rfl:     clindamycin 300 MG Oral Cap, Take 150 mg by mouth 4 (four) times daily., Disp: , Rfl:     Immune Globulin, Human, (GAMMAGARD S/D LESS IGA) 5 g Intravenous Recon Soln, Gammagard S-D (IgA < 1 mcg/mL) 5 gram intravenous solution, [RxNorm: 0466900], Disp: , Rfl:     Doxycycline Hyclate 100 MG Oral Tab, Take 1 tablet (100 mg total) by mouth daily., Disp: , Rfl:     HYDROcodone-acetaminophen 5-325 MG Oral Tab, Take 1 tablet by mouth every 6 (six) hours as needed. (Patient taking differently: Take 1 tablet by mouth every 6 (six) hours as needed. 7.5-325), Disp: , Rfl:     Zaleplon 5 MG Oral Cap, Take 1 capsule (5 mg total) by mouth nightly., Disp: , Rfl:     diphenhydrAMINE 25 MG Oral Cap, Take 1 capsule (25 mg total) by mouth every 6 (six) hours as needed for Itching., Disp: , Rfl:     gabapentin 300 MG Oral Cap, TAKE 3 CAPSULES BY MOUTH IN THE MORNING, AT NOON AND AT BEDTIME, Disp: 810 capsule, Rfl: 2    multiple vitamin Oral Chew Tab, Chew 1 tablet by mouth daily., Disp: , Rfl:     atorvastatin 40 MG Oral Tab, Take 1 tablet (40 mg total) by mouth nightly., Disp: 90 tablet, Rfl: 0    Coenzyme Q10-Vitamin E (QUNOL ULTRA COQ10 OR), Take 100 mg by mouth daily., Disp: , Rfl:     hydrALAZINE 50 MG Oral Tab, Take 1 tablet (50 mg total) by mouth every morning. Take with 100mg in AM, Disp: , Rfl:     labetalol 200 MG Oral Tab, Take 1 tablet (200 mg total) by mouth 2 (two) times daily., Disp: , Rfl:     hydrALAZINE 100 MG Oral Tab, Take 1 tablet (100 mg total) by mouth  3 (three) times daily., Disp: , Rfl:     torsemide 20 MG Oral Tab, Take 1 tablet (20 mg total) by mouth in the morning and 1 tablet (20 mg total) before bedtime., Disp: , Rfl:     spironolactone 25 MG Oral Tab, Take 1 tablet (25 mg total) by mouth daily., Disp: , Rfl:     apixaban 5 MG Oral Tab, Take by mouth 2 (two) times daily., Disp: , Rfl:     ondansetron 4 MG Oral Tablet Dispersible, Take 1 tablet (4 mg total) by mouth. (Patient not taking: Reported on 6/4/2025), Disp: , Rfl:     oxyCODONE 5 MG Oral Tab, , Disp: , Rfl:     pantoprazole 40 MG Oral Tab EC, Take 1 tablet by mouth daily beginning post-op day 1 for 2 days. (Patient not taking: Reported on 6/4/2025), Disp: , Rfl:     sennosides 8.6 MG Oral Tab, Take 1 tablet (8.6 mg total) by mouth 2 (two) times daily. (Patient not taking: Reported on 6/4/2025), Disp: , Rfl:     celecoxib 200 MG Oral Cap, Take 1 capsule (200 mg total) by mouth as needed for Pain., Disp: , Rfl:     cetirizine 10 MG Oral Tab, Take 1 tablet (10 mg total) by mouth daily. (Patient not taking: Reported on 6/4/2025), Disp: , Rfl:     Review of Systems:  No chest pain or palpitations; otherwise as noted in HPI.    Exam:  /60   Pulse (!) 45   Resp 16   Ht 74\"   Wt 280 lb (127 kg)   SpO2 98%   BMI 35.95 kg/m²   Estimated body mass index is 35.95 kg/m² as calculated from the following:    Height as of this encounter: 74\".    Weight as of this encounter: 280 lb (127 kg).    Gen: well developed, well nourished, no acute distress  HEENT: normocephalic  Heart; normal S1/S2, regular rate and rhythm  Lungs: clear to auscultation bilaterally  Extremities: no edema, peripheral pulses intact    Neck: supple, full range of motion; no carotid bruits    Fundoscopic Exam: optic discs sharp bilaterally    Neuro:  Mental status:  Orientation: Alert and oriented to person, place, time  Speech Fluent and conversational    CN: PERRL, EOMI with no nystagmus, VFF, smile symmetric, sensation intact,  tongue and palate midline, SCM intact, otherwise, CN 2-12 intact  Motor:  left ABP minimally weak 5-/5, left LE intact; otherwise,  5/5 throughout today  DTR:  2+ in UE and patellar; toes downgoing; no clonus; trace ankle jerk on R and absent on L    Sensory:  pin is reduced in UE over fingertips and R LE up to calf and left LE up to mid foot  Vibration is  absent bilateral LE at great toes and briefly present R malleolus; absent L side up to knee; UE 6 L thumb in UE; 5 on R   Proprioception is normal  Coord: FNF intact with no tremor or dysmetria; rapid alternating movements intact bilaterally  Romberg: equivocal  Gait: Casual gait antalgic, broad based but briefly able to walk on heels and toes; cannot tandem at all; no cane or walker needed     Labs:    None new    Prior as noted below    Component      Latest Ref Rng 10/26/2023   PROTEIN, TOTAL      5.7 - 8.2 g/dL 7.2    Albumin      3.8 - 4.8 g/dL 4.62    ALPHA-1-GLOBULINS      0.19 - 0.46 g/dL 0.22    ALPHA-2-GLOBULINS      0.48 - 1.05 g/dL 0.76    BETA GLOBULINS      0.68 - 1.23 g/dL 0.68    GAMMA GLOBULINS      0.62 - 1.70 g/dL 0.92    ALBUMIN/GLOBULIN RATIO      1.00 - 2.00  1.79    SPE INTERPRETATION No apparent monoclonal protein on serum electrophoresis.    IMMUNOFIXATION No monoclonal protein detected by immunofixation.…    KAPPA FREE LIGHT CHAIN      0.330 - 1.940 mg/dL 0.776    LAMBDA FREE LIGHT CHAIN      0.571 - 2.630 mg/dL 0.445 (L)    KAPPA/LAMBDA FLC RATIO      0.26 - 1.65  1.74 (H)    Case Report    GENERAL CATEGORIZATION:             FINAL DIAGNOSIS    Procedure    CLINICAL INFORMATION    Non Gyne Interpretation             Gross Description    WBC Calculated, CSF      /CUMM    Neutrophils CSF      0 - 6 %    Lymphocytes CSF      40 - 80 %    Mono/Macrophages CSF      15 - 45 %    EOSINOPHILS CSF      %    Basophil CSF      %    TOTAL CELLS COUNTED    GM1 Antibody, IgG      0 - 50 IV 3    GM1 Antibody, IgM      0 - 50 IV 3    GD1b Antibody,  IgG      0 - 50 IV 4    GD1b Antibody, IgM      0 - 50 IV 3    GQ1b Antibody, IgG      0 - 50 IV 8    GQ1b Antibody, IgM      0 - 50 IV 3    TOTAL VOLUME CSF      mL    CSF TUBE #    Color CSF      Colorless     Clarity CSF      Clear     TNC, CSF      0 - 5 /CUMM    RBC CSF      <1 /CUMM    IMMUNOGLOBULIN G CSF      0.0 - 10.3 mg/dL    Albumin, CSF      15 - 55 mg/dL    IMMUNOGLOBULIN G      791 - 1,643 mg/dL 1,010    CSF IGG/ALBUMIN RATIO      0.00 - 0.25     IGG INDEX      0.0 - 0.7     MAG IgM Autoantibodies      0 - 999 BTU 1133 (H)    ACE, CSF      0.0 - 3.1 U/L    Glucose CSF      40 - 70 mg/dL    Total Protein CSF      15.0 - 45.0 mg/dL      Component      Latest Ref Rn 11/2/2023   PROTEIN, TOTAL      5.7 - 8.2 g/dL    Albumin      3.8 - 4.8 g/dL 4.1    ALPHA-1-GLOBULINS      0.19 - 0.46 g/dL    ALPHA-2-GLOBULINS      0.48 - 1.05 g/dL    BETA GLOBULINS      0.68 - 1.23 g/dL    GAMMA GLOBULINS      0.62 - 1.70 g/dL    ALBUMIN/GLOBULIN RATIO      1.00 - 2.00     SPE INTERPRETATION    IMMUNOFIXATION    KAPPA FREE LIGHT CHAIN      0.330 - 1.940 mg/dL    LAMBDA FREE LIGHT CHAIN      0.571 - 2.630 mg/dL    KAPPA/LAMBDA FLC RATIO      0.26 - 1.65     Case Report Non-Gynecologic Cytology Case: [id number] …    GENERAL CATEGORIZATION:          Benign, inflammatory, reactive or reparative changes    FINAL DIAGNOSIS This result contains rich text formatting which cannot be displayed here.    Procedure This result contains rich text formatting which cannot be displayed here.    CLINICAL INFORMATION This result contains rich text formatting which cannot be displayed here.    Non Gyne Interpretation          Benign    Gross Description This result contains rich text formatting which cannot be displayed here.    WBC Calculated, CSF      /CUMM 4    Neutrophils CSF      0 - 6 % 0    Lymphocytes CSF      40 - 80 % 65    Mono/Macrophages CSF      15 - 45 % 35    EOSINOPHILS CSF      % 0    Basophil CSF      % 0    TOTAL CELLS  COUNTED 100    GM1 Antibody, IgG      0 - 50 IV    GM1 Antibody, IgM      0 - 50 IV    GD1b Antibody, IgG      0 - 50 IV    GD1b Antibody, IgM      0 - 50 IV    GQ1b Antibody, IgG      0 - 50 IV    GQ1b Antibody, IgM      0 - 50 IV    TOTAL VOLUME CSF      mL 19.0    CSF TUBE # 3    Color CSF      Colorless  Colorless    Clarity CSF      Clear  Clear    TNC, CSF      0 - 5 /CUMM 4    RBC CSF      <1 /CUMM 11 (H)    IMMUNOGLOBULIN G CSF      0.0 - 10.3 mg/dL 4.1    Albumin, CSF      15 - 55 mg/dL 38    IMMUNOGLOBULIN G      791 - 1,643 mg/dL 929    CSF IGG/ALBUMIN RATIO      0.00 - 0.25  0.11    IGG INDEX      0.0 - 0.7  0.5    MAG IgM Autoantibodies      0 - 999 BTU    ACE, CSF      0.0 - 3.1 U/L <1.5    Glucose CSF      40 - 70 mg/dL 58    Total Protein CSF      15.0 - 45.0 mg/dL 67.4 (H)      Component      Latest Ref [facility] 11/7/2023   PROTEIN, TOTAL      5.7 - 8.2 g/dL    Albumin      3.8 - 4.8 g/dL    ALPHA-1-GLOBULINS      0.19 - 0.46 g/dL    ALPHA-2-GLOBULINS      0.48 - 1.05 g/dL    BETA GLOBULINS      0.68 - 1.23 g/dL    GAMMA GLOBULINS      0.62 - 1.70 g/dL    ALBUMIN/GLOBULIN RATIO      1.00 - 2.00     SPE INTERPRETATION    IMMUNOFIXATION    KAPPA FREE LIGHT CHAIN      0.330 - 1.940 mg/dL    LAMBDA FREE LIGHT CHAIN      0.571 - 2.630 mg/dL    KAPPA/LAMBDA FLC RATIO      0.26 - 1.65     Case Report    GENERAL CATEGORIZATION:             FINAL DIAGNOSIS    Procedure    CLINICAL INFORMATION    Non Gyne Interpretation             Gross Description    WBC Calculated, CSF      /CUMM    Neutrophils CSF      0 - 6 %    Lymphocytes CSF      40 - 80 %    Mono/Macrophages CSF      15 - 45 %    EOSINOPHILS CSF      %    Basophil CSF      %    TOTAL CELLS COUNTED    GM1 Antibody, IgG      0 - 50 IV    GM1 Antibody, IgM      0 - 50 IV    GD1b Antibody, IgG      0 - 50 IV    GD1b Antibody, IgM      0 - 50 IV    GQ1b Antibody, IgG      0 - 50 IV    GQ1b Antibody, IgM      0 - 50 IV    TOTAL VOLUME CSF      mL    CSF TUBE #     Color CSF      Colorless     Clarity CSF      Clear     TNC, CSF      0 - 5 /CUMM    RBC CSF      <1 /CUMM    IMMUNOGLOBULIN G CSF      0.0 - 10.3 mg/dL    Albumin, CSF      15 - 55 mg/dL    IMMUNOGLOBULIN G      791 - 1,643 mg/dL 1,020    CSF IGG/ALBUMIN RATIO      0.00 - 0.25     IGG INDEX      0.0 - 0.7     MAG IgM Autoantibodies      0 - 999 BTU    ACE, CSF      0.0 - 3.1 U/L    Glucose CSF      40 - 70 mg/dL    Total Protein CSF      15.0 - 45.0 mg/dL       Legend:  (L) Low  (H) High    Component      Latest Ref Rng 9/5/2023   PROTEIN, TOTAL      6.4 - 8.2 g/dL 7.0    Albumin      3.75 - 5.21 g/dL 4.31    ALPHA-1-GLOBULINS      0.19 - 0.46 g/dL 0.23    ALPHA-2-GLOBULINS      0.48 - 1.05 g/dL 0.76    BETA GLOBULINS      0.68 - 1.23 g/dL 0.71    GAMMA GLOBULINS      0.62 - 1.70 g/dL 0.99    ALBUMIN/GLOBULIN RATIO      1.00 - 2.00  1.60    SPE INTERPRETATION No apparent monoclonal protein on serum electrophoresis.…    IMMUNOFIXATION No monoclonal protein detected by immunofixation.…    KAPPA FREE LIGHT CHAIN      0.330 - 1.940 mg/dL 2.037 (H)    LAMBDA FREE LIGHT CHAIN      0.571 - 2.630 mg/dL 1.358    KAPPA/LAMBDA FLC RATIO      0.26 - 1.65  1.50    Expanded STEVIE Antibody Screen, IGG      <0.7 ug/l 0.20    Anti-dsDNA antibody      <10 IU/mL <0.6    Connective Tissue Disease Screen Interpretation      Negative  Negative    Vitamin B12      193 - 986 pg/mL 352    C-REACTIVE PROTEIN      <0.30 mg/dL <0.29    SED RATE      0 - 20 mm/Hr 11    RHEUMATOID FACTOR      <15 IU/mL <10       Legend:  (H) High    Imaging:  None new    Prior as noted below    MRI SPINE CERVICAL (CPT=72141)    Result Date: 11/28/2023        FINDINGS:    CERVICAL DISC LEVELS:   C2-C3:  No significant disc/facet abnormality, spinal stenosis, or foraminal narrowing.   C3-C4:  There is mild disc desiccation.  There is a prominent right sided disc/osteophyte complex with associated bilateral extensive facet arthropathy and facet hypertrophy.  There  is associated severe right neural foraminal stenosis and slightly less   pronounced moderate to severe left-sided neural foraminal stenosis.  No significant central canal stenosis.   C4-C5:  There is moderate disc desiccation with disc height loss and broad-based disc bulge.  There are bilateral uncovertebral joint disc/osteophyte complexes.  There is moderate bilateral facet arthropathy with hypertrophy.  There is mild central canal    stenosis and moderate to severe bilateral neural foraminal stenosis, greater on the right.   C5-C6:  There is moderate to extensive disc desiccation with disc height loss and broad-based disc bulge.  Bilateral uncovertebral joint disc/osteophyte complex is noted.  Moderate bilateral posterior articular facet joint arthropathy.  There is mild   central canal stenosis and moderate to severe bilateral neural foraminal stenosis.   C6-C7:  There is moderate to extensive disc desiccation with disc height loss and broad-based disc bulge.  There are prominent bilateral uncovertebral joint disc/osteophyte complexes.  There is moderate bilateral facet arthropathy with hypertrophy.    There is secondary mild central canal stenosis and moderate to severe bilateral neural foraminal stenosis.   C7-T1:  There is moderate disc desiccation with disc height loss and broad-based disc bulge.  There is moderate bilateral facet arthropathy.  No significant central canal stenosis is noted.  There is mild to moderate bilateral neural foraminal stenosis,   greater on the left.      CRANIOCERVICAL AREA:  Normal foramen magnum with no Chiari malformation.    PARASPINAL AREA:  Normal with no visible mass.    BONY STRUCTURES:  There is no evidence of acute osseous injuries.  There is multilevel posterior facet arthropathy and uncovertebral joint arthropathy of the cervical spine as described above.  No lytic, blastic or destructive osseous changes.   CORD:  Normal caliber, contour, and signal intensity.        CONCLUSION:  1. No acute process. 2. Significant multilevel disc disease and facet arthropathy with multilevel severe neural foraminal stenosis and multilevel mild central canal stenosis as described above. 3. Normal appearance of the cervical spinal cord and visualized hind brain structures.   LOCATION:  [facility]   Dictated by (CST): Alberta Mercado DO on 11/28/2023 at 8:58 AM     Finalized by (CST): Alberta Mercado DO on 11/28/2023 at 9:04 AM          Other procedures  None new    Prior as noted below    NCS/EMG (8/29/2023):      Sensory NCS      Nerve / Sites Rec. Site Onset Lat Peak Lat NP Amp PP Amp Segments Distance Peak Diff Velocity Comment       ms ms µV µV   cm ms m/s     R Median - Dig II (Antidromic)      Wrist Index 3.33 4.22 9.3 21.2 Wrist - Index 15   45     R Ulnar - Dig V (Antidromic)      Wrist Dig V 2.71 3.59 9.9 8.2 Wrist - Dig V 13   48        2 Dig V 2.76 3.54 8.7 7.6             L Ulnar - Dig V (Antidromic)      Wrist Dig V 2.81 3.65 11.9 8.5 Wrist - Dig V 13   46     R Radial - Superficial (Antidromic)      Forearm Wrist 1.93 2.50 22.6 8.9 Forearm - Wrist 10   52     L Radial - Superficial (Antidromic)      Forearm Wrist 1.88 2.66 19.4 4.3 Forearm - Wrist 10   53     L Median - Dig III (Antidromic)      Wrist Index 3.07 3.96 9.0 19.1 Wrist - Index 15   49        Palm Index 3.02 3.91 8.4 19.8 Palm - Index 7   23     R Median, Ulnar - Transcarpal comparison      Median Palm Wrist 2.03 2.60 16.5 23.4 Median Palm - Wrist 8   39        Ulnar Palm Wrist 1.41 1.93 9.8 8.4 Ulnar Palm - Wrist 8   57                 Median Palm - Ulnar Palm   0.68       L Median, Ulnar - Transcarpal comparison      Median Palm Wrist 1.56 2.19 26.7 40.0 Median Palm - Wrist 8   51        Ulnar Palm Wrist 1.61 2.08 9.3 14.8 Ulnar Palm - Wrist 8   50                 Median Palm - Ulnar Palm   0.10           Motor NCS      Nerve / Sites Muscle Latency Amplitude Segments Dist. Lat Diff Velocity Comments       ms mV   cm ms m/s      R Median - APB      Wrist APB 4.23 6.0 Wrist - APB 7            Elbow APB 9.96 5.5 Elbow - Wrist 27.5 5.73 48.0     L Median - APB      Wrist APB 3.56 8.3 Wrist - APB 7            Elbow APB 8.81 8.0 Elbow - Wrist 25.5 5.25 48.6     R Ulnar - ADM      Wrist ADM 2.85 9.4 Wrist - ADM 7            B.Elbow ADM 7.75 8.7 B.Elbow - Wrist 24.5 4.90 50.0     L Ulnar - ADM      Wrist ADM 2.79 9.7 Wrist - ADM 7            B.Elbow ADM 7.67 9.0 B.Elbow - Wrist 25.5 4.88 52.3         F  Wave      Nerve M Latency F Latency F-M Lat     ms ms ms   R Median - APB 4.6 35.1 30.4   R Ulnar - ADM 3.1 36.4 33.3   L Median - APB 3.8 33.7 29.9   L Ulnar - ADM 3.3 36.0 32.7                   EMG Summary Table       Spontaneous MUAP Recruitment   Muscle IA Fib PSW Fasc H.F. Amp Dur. PPP Pattern   L. Deltoid N None None None None N N N N   L. Biceps brachii N None None None None N N N N   L. Triceps brachii N None None None None N N N N   L. Extensor digitorum communis N None None None None N N N N   L. First dorsal interosseous N None None None None N N N N   L. Abductor pollicis brevis N None None None None N N N N   R. Deltoid N None None None None N N N N   R. Biceps brachii N None None None None N N N N   R. Triceps brachii N None None None None N N N N   R. Extensor digitorum communis N None None None None N N N N   R. First dorsal interosseous N None None None None N N N N   R. Abductor pollicis brevis N None None None None N N N N            Findings:      Nerve conduction studies:   Right median motor response was borderline due to borderline conduction velocity with normal distal motor latency and amplitude; F wave response was prolonged.   Left median motor response was borderline due to borderline conduction velocity with normal distal motor latency and amplitude; F wave response was prolonged.   Right ulnar motor response was normal; F wave response was prolonged.  Left ulnar motor response was normal; F wave response was  prolonged.   Right median sensory response was abnormal due to prolonged peak latency with reduced amplitude and slowed conduction velocity.   Left median sensory response was abnormal due to prolonged peak latency with reduced amplitude and slowed conduction velocity.   Right ulnar sensory response was borderline due to borderline prolonged peak latency, with borderline amplitude and borderline conduction velocity.   Left ulnar sensory response was borderline due to borderline prolonged peak latency, with borderline amplitude and borderline conduction velocity.   Right radial sensory response was normal.   Left radial sensory response was normal.   Right median to ulnar palmar mixed nerve comparison study was abnormal due to significant prolongation of median relative to ulnar nerve greater than 0.4 msec (~0.68 msec), consistent with focal slowing across the wrist.   Left median to ulnar palmar mixed nerve comparison study was normal.      EMG (needle exam):   Concentric needle EMG was performed on the selected muscles listed above in the table with the following findings: all muscles tested were normal.      Impression:   This is an abnormal study. 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  This is demyelinating type with sensory involvement.  In addition, there is suggestion for mild sensory neuropathy, which is primary axonal.  There is no suggestion for a primary demyelinating neuropathy or myopathy.       Prior as noted below:     NCS /EMG (8/9/2023):     Sensory NCS      Nerve / Sites Rec. Site Onset Lat Peak Lat NP Amp PP Amp Segments Distance Velocity Comment       ms ms µV µV   cm m/s     R Sural - (Antidromic)      Calf Ankle 3.18 4.11 4.6 6.0 Calf - Ankle 14 44        2 Ankle                   L Sural - (Antidromic)      Calf Ankle NR NR NR NR Calf - Ankle 14 NR         Motor NCS      Nerve / Sites Muscle Latency Amplitude Segments Dist.  Lat Diff Velocity Comments       ms mV   cm ms m/s     R Peroneal - EDB      Ankle EDB 5.73 1.3 Ankle - EDB 8            B. Fib Head EDB 15.58 1.2 B. Fib Head - Ankle 35 9.85 35.5     L Peroneal - EDB      Ankle EDB 5.71 1.2 Ankle - EDB 8            B. Fib Head EDB 15.67 1.0 B. Fib Head - Ankle 35 9.96 35.1     R Tibial - AH      Ankle AH 5.08 0.9 Ankle - AH 8            Knee AH 17.33 0.5 Knee - Ankle 44 12.25 35.9     L Tibial - AH      Ankle AH 5.56 1.0 Ankle - AH 8            Knee AH 17.10 0.6 Knee - Ankle 43.5 11.54 37.7         F  Wave      Nerve M Latency F Latency F-M Lat     ms ms ms   R Peroneal - EDB 6.4 51.6 45.2   R Tibial - AH 11.9 70.7 58.8   L Tibial - AH 6.2 71.4 65.2   L Peroneal - EDB 6.5 67.3 60.8                             EMG Summary Table       Spontaneous MUAP Recruitment   Muscle IA Fib PSW Fasc H.F. Amp Dur. PPP Pattern   R. Vastus medialis N None None None None N N N N   L. Vastus medialis N None None None None N N N N   R. Peroneus longus N None None None None N N N N   L. Peroneus longus N None None None None N N N N   R. Tibialis anterior N None None None None N N N N   L. Tibialis anterior N None None None None N N N N   R. Gastrocnemius N None None None None N N N N   L. Gastrocnemius N None None None None N N N N   R. Extensor hallucis longus N None None None None N N N N   L. Extensor hallucis longus N None None None None N N N N            Summary:      Nerve conduction studies:  Right sural sensory response was borderline due to borderline amplitude with normal peak latency and normal conduction velocity.  Left sural sensory response was absent.   Right common peroneal motor response was abnormal due to reduced amplitude, with normal distal motor latency, and mildly slowed conduction velocity; F wave response was normal.  Left common peroneal motor response was abnormal due to reduced amplitude, with normal distal motor latency, and mildly slowed conduction velocity; F-wave  response was mildly prolonged.  Right tibial motor response was abnormal due to reduced amplitude, with normal distal motor latency, and mildly slowed conduction velocity; F-wave response was prolonged; in addition, there was suggestion for temporal dispersion on proximal stimulation.  Left tibial motor response was abnormal due to reduced amplitude, with normal distal motor latency, and mildly slowed conduction velocity; F-wave response was prolonged; in addition, there was suggestion for temporal dispersion on proximal stimulation.     EMG (needle exam):  Concentric needle EMG was performed on the selected muscles listed above in the table, with the following findings: All muscles tested were normal     Impression:  This is an abnormal study.  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    There was no suggestion for myopathic units or acute denervation.  It should be noted that the testing was somewhat technically difficult due to patient's body habitus.      Clinical comment:   On the basis of this limited study, there are some findings of asymmetry, with worsening in the left lower extremity in terms of the sural sensory response.  In addition, there is some suggestion for temporal dispersion in the right tibial motor response on proximal stimulation as well as in the left tibial motor response. While not meeting criteria for chronic inflammatory demyelinating polyneuropathy (CIDP), or a primary demyelinating neuropathy, the presence of asymmetric features and some uniformly prolonged F waves raises this possibility; alternatively, this may be due to technical factors related to patient's body habitus.  Additional testing with EMG of the upper extremities may provide additional information regarding the etiology of this likely chronic neuropathy.          Impression/ Plan:  Wil Llnaos is a 75 year  old male who originally presented 8/24/2023 for evaluation of multiple complaints.  He started to have cramping in both legs in the past 6 months.  He also had numbness in toes 3 yrs ago.  He states he intermittently is having \"locking up\" of hands with thumb moving inward on R or L side - states this has been in the past few weeks.       Neurologic exam showed decreased sensation in both feet with depressed DTRs distally as well as reduced sensation in UE to vibration. In addition, there was some weakness in APB muscles in UE bilaterally. Signs/symptoms are overall concerning for neuropathy.  In addition, NCS/EMG done of both legs showed some asymmetry with absent sural sensory response on left side and present but reduced on R side. Tibial motor response showed bilateral temporal dispersion proximally and this could suggest an atypical neuropathy or be due to artifact.      In order to further evaluate, NCS/EMG of both upper extremities was done and showed evidence for sensory neuropathy with axonal and demyelinating features along with median entrapment neuropathy across the right wrist, suggestive of carpal tunnel syndrome, which is mild.  Labs were done and showed mild elevation in kappa free light chain but normal ratio and borderline B12 level with normal ESR, CRP, RF.  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       He was on IVIG but had multiple knee surgery revisions and infections and was not on IVIG; he is now back on IVIG but has surgery again scheduled - recommend resume IVIG after surgery if ok with surgery service - resume with every 4 weeks infusion    He has some numbness in both feet; spasm in hands and legs  is improved on gabapentin 900 mg tid; will continue gabapentin at 900 mg tid.     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 Elevated serum immunoglobulin free light chain level  As noted above     Return in about 3 months (around 9/4/2025).    Mode Cox MD, Neurology  [facility]  Pager [phone]  6/4/2025

## 2025-06-05 ENCOUNTER — LAB ENCOUNTER (OUTPATIENT)
Dept: LAB | Age: 75
End: 2025-06-05
Attending: ORTHOPAEDIC SURGERY
Payer: MEDICARE

## 2025-06-05 DIAGNOSIS — Z01.812 PRE-OPERATIVE LABORATORY EXAMINATION: Primary | ICD-10-CM

## 2025-06-05 DIAGNOSIS — M19.09 TEMPOROMANDIBULAR JOINT OSTEOARTHRITIS: ICD-10-CM

## 2025-06-05 LAB
ALBUMIN SERPL-MCNC: 4.6 G/DL (ref 3.2–4.8)
ALBUMIN/GLOB SERPL: 1.5 {RATIO} (ref 1–2)
ALP LIVER SERPL-CCNC: 69 U/L (ref 45–117)
ALT SERPL-CCNC: 27 U/L (ref 10–49)
ANION GAP SERPL CALC-SCNC: 10 MMOL/L (ref 0–18)
AST SERPL-CCNC: 39 U/L (ref ?–34)
BASOPHILS # BLD AUTO: 0.03 X10(3) UL (ref 0–0.2)
BASOPHILS NFR BLD AUTO: 0.5 %
BILIRUB SERPL-MCNC: 0.8 MG/DL (ref 0.2–1.1)
BUN BLD-MCNC: 31 MG/DL (ref 9–23)
CALCIUM BLD-MCNC: 9.9 MG/DL (ref 8.7–10.6)
CHLORIDE SERPL-SCNC: 104 MMOL/L (ref 98–112)
CO2 SERPL-SCNC: 27 MMOL/L (ref 21–32)
CREAT BLD-MCNC: 1.57 MG/DL (ref 0.7–1.3)
EGFRCR SERPLBLD CKD-EPI 2021: 46 ML/MIN/1.73M2 (ref 60–?)
EOSINOPHIL # BLD AUTO: 0.2 X10(3) UL (ref 0–0.7)
EOSINOPHIL NFR BLD AUTO: 3.3 %
ERYTHROCYTE [DISTWIDTH] IN BLOOD BY AUTOMATED COUNT: 14.4 %
FASTING STATUS PATIENT QL REPORTED: NO
GLOBULIN PLAS-MCNC: 3 G/DL (ref 2–3.5)
GLUCOSE BLD-MCNC: 95 MG/DL (ref 70–99)
HCT VFR BLD AUTO: 40.7 % (ref 39–53)
HGB BLD-MCNC: 13.4 G/DL (ref 13–17.5)
IMM GRANULOCYTES # BLD AUTO: 0.01 X10(3) UL (ref 0–1)
IMM GRANULOCYTES NFR BLD: 0.2 %
LYMPHOCYTES # BLD AUTO: 1.74 X10(3) UL (ref 1–4)
LYMPHOCYTES NFR BLD AUTO: 28.9 %
MCH RBC QN AUTO: 29.1 PG (ref 26–34)
MCHC RBC AUTO-ENTMCNC: 32.9 G/DL (ref 31–37)
MCV RBC AUTO: 88.3 FL (ref 80–100)
MONOCYTES # BLD AUTO: 0.78 X10(3) UL (ref 0.1–1)
MONOCYTES NFR BLD AUTO: 12.9 %
NEUTROPHILS # BLD AUTO: 3.27 X10 (3) UL (ref 1.5–7.7)
NEUTROPHILS # BLD AUTO: 3.27 X10(3) UL (ref 1.5–7.7)
NEUTROPHILS NFR BLD AUTO: 54.2 %
OSMOLALITY SERPL CALC.SUM OF ELEC: 298 MOSM/KG (ref 275–295)
PLATELET # BLD AUTO: 196 10(3)UL (ref 150–450)
POTASSIUM SERPL-SCNC: 4.1 MMOL/L (ref 3.5–5.1)
PROT SERPL-MCNC: 7.6 G/DL (ref 5.7–8.2)
RBC # BLD AUTO: 4.61 X10(6)UL (ref 3.8–5.8)
SODIUM SERPL-SCNC: 141 MMOL/L (ref 136–145)
WBC # BLD AUTO: 6 X10(3) UL (ref 4–11)

## 2025-06-05 PROCEDURE — 80053 COMPREHEN METABOLIC PANEL: CPT

## 2025-06-05 PROCEDURE — 85025 COMPLETE CBC W/AUTO DIFF WBC: CPT

## 2025-06-05 PROCEDURE — 36415 COLL VENOUS BLD VENIPUNCTURE: CPT

## 2025-07-02 ENCOUNTER — TELEPHONE (OUTPATIENT)
Dept: NEUROLOGY | Facility: CLINIC | Age: 75
End: 2025-07-02

## 2025-07-02 NOTE — TELEPHONE ENCOUNTER
Spoke to patient, requested something in writing faxed to this office.   We will then inform Dr Cox and re-activate the IVIG therapy plan with his approval.

## 2025-07-02 NOTE — INTERVAL H&P NOTE
----- Message from NARCISO Villegas CNP sent at 7/2/2025  8:20 AM EDT -----  Please call patient (with her mom if needed), and let them know the palpable nodules are likely BENIGN and we can repeat the ultrasounds every 6 months for 2 years as recommended by the radiologist to be sure they are not changing.  I have entered the order for her January 2026 repeat U/S.       Pre-op Diagnosis: Umbilical hernia without obstruction or gangrene [K42.9]    The above referenced H&P was reviewed by Precious Wilkes MD on 10/8/2021, the patient was examined and no significant changes have occurred in the patient's condition since

## 2025-07-02 NOTE — TELEPHONE ENCOUNTER
Patient stated he received clearance from infectious disease provider and Dr. Cox to proceed with infusions at [facility].    Please call patient to discuss details and advise.

## 2025-07-03 RX ORDER — DIPHENHYDRAMINE HYDROCHLORIDE 50 MG/ML
25 INJECTION, SOLUTION INTRAMUSCULAR; INTRAVENOUS ONCE
OUTPATIENT
Start: 2025-07-07

## 2025-07-03 RX ORDER — ACETAMINOPHEN 325 MG/1
650 TABLET ORAL ONCE
OUTPATIENT
Start: 2025-07-07

## 2025-07-03 RX ORDER — DIPHENHYDRAMINE HCL 25 MG
25 CAPSULE ORAL ONCE
OUTPATIENT
Start: 2025-07-07

## 2025-07-03 NOTE — TELEPHONE ENCOUNTER
Patient is straight Medicare, no PA needed.    Therapy plan placed for 6 months' worth of infusions.  Patient has follow-up in September to discuss efficacy.

## 2025-07-03 NOTE — TELEPHONE ENCOUNTER
Recommendations from infectious disease received. Not visible in care everywhere. Copy sent to scan. Original placed on provider's desk to review regarding IVIG orders.

## 2025-07-03 NOTE — TELEPHONE ENCOUNTER
Received medical records from [facility].    Placed in nurses bin for review.

## 2025-07-09 ENCOUNTER — TELEPHONE (OUTPATIENT)
Age: 75
End: 2025-07-09

## 2025-07-14 NOTE — PROGRESS NOTES
Subjective:   Wil Llanos is a 75 year old male who presents for a Subsequent Annual Wellness visit (Pt already had Initial Annual Wellness) and scheduled follow up of multiple significant but stable problems.             HPI  Feels well  No complaints today  Denies cp or sob    PAST MEDICAL, SOCIAL, FAMILY HISTORIES REVIEWED WITH PT    History/Other:   Fall Risk Assessment:   He has been screened for Falls and is High Risk. Fall Prevention information provided to patient in After Visit Summary.    Do you feel unsteady when standing or walking?: No  Do you worry about falling?: Yes  Have you fallen in the past year?: Yes  How many times have you fallen?: (Patient-Rptd) (P) 2  Were you injured?: No     Cognitive Assessment:   He had a completely normal cognitive assessment - see flowsheet entries       Functional Ability/Status:   Wil Llanos has some abnormal functions as listed below:  He has Vision problems based on screening of functional status.       Depression Screening (PHQ):  PHQ-2 SCORE: 0  , done 7/15/2025            Advanced Directives:   He does have a Living Will but we do NOT have it on file in Epic.    He does have a POA but we do NOT have it on file in Epic.    Discussed Advance Care Planning with patient (and family/surrogate if present). Standard forms made available to patient in After Visit Summary.      Problem List[1]  Allergies:  He is allergic to amlodipine, hydromorphone, penicillins, rivaroxaban, vancomycin, hydralazine, losartan, ace inhibitors, lisinopril, morphine, and oxycodone.    Current Medications:  Active Meds, Sig Only[2]    Medical History:  He  has a past medical history of AAA (abdominal aortic aneurysm), Allergic rhinitis, Anesthesia complication, Arrhythmia, Cataract, Complex sleep apnea syndrome (2016), Diverticulosis of large intestine, Essential hypertension, High blood pressure, Osteoarthritis, Polyneuropathy, Sleep apnea, and Umbilical hernia without  obstruction or gangrene.  Surgical History:  He  has a past surgical history that includes tonsillectomy; total hip replacement (Left); arthroscopy of joint unlisted (Right); other; and other.   Family History:  His family history includes Colon Polyps in his self; Heart Disorder in his father; Other in his mother.  Social History:  He  reports that he quit smoking about 23 years ago. His smoking use included cigarettes. He started smoking about 44 years ago. He has a 20 pack-year smoking history. He has never been exposed to tobacco smoke. He has never used smokeless tobacco. He reports current alcohol use. He reports that he does not use drugs.    Tobacco:  He smoked tobacco in the past but quit greater than 12 months ago.  Tobacco Use[3]     CAGE Alcohol Screen:   CAGE screening score of 0 on 7/15/2025, showing low risk of alcohol abuse.      Patient Care Team:  Hayder John MD as PCP - General (Internal Medicine)  Julienne Segovia MD (SURGERY, ORTHOPEDIC)  Chance Reyes MD (CARDIOLOGY)  Dana Luque PA (Physician Assistant)  Mode Cox MD (NEUROLOGY)    Review of Systems  A comprehensive 10 point review of systems was completed.     Pertinent positives and negatives noted in the HPI.      Objective:   Physical Exam  General: No acute distress. Alert and oriented x 3.  HEENT: Normocephalic atraumatic.   Respiratory: Clear to auscultation bilaterally. No wheezes. No rhonchi.  Cardiovascular: S1, S2. Regular rate and rhythm.   Abdomen: Soft,  no pain.  nontender, nondistended.  Positive bowel sounds. .  Extremities: No edema or cyanosis.  Integument: No rashes or lesions.    Psychiatric: Appropriate mood and affect.    /70   Pulse 52   Temp 97.8 °F (36.6 °C)   Resp 16   Ht 6' 2\" (1.88 m)   Wt 276 lb (125.2 kg)   SpO2 98%   BMI 35.44 kg/m²  Estimated body mass index is 35.44 kg/m² as calculated from the following:    Height as of this encounter: 6' 2\" (1.88 m).    Weight as of this  encounter: 276 lb (125.2 kg).    Medicare Hearing Assessment:   Hearing Screening    Screening Method: Whisper Test  Whisper Test Result: Pass               Assessment & Plan:   Wil Llanos is a 75 year old male who presents for a Medicare Assessment.     1. Annual physical exam (Primary)  2. Atherosclerosis of native coronary artery of native heart without angina pectoris  Overview:  Formatting of this note might be different from the original.  abnl CT chest, negative EST, sees cardiology  3. Ectatic abdominal aorta  Overview:  Formatting of this note might be different from the original.  sees cardiology  Formatting of this note might be different from the original.  sees cardiology  Formatting of this note might be different from the original.  sees cardiology  4. Essential hypertension  Overview:  Last Assessment & Plan:   Formatting of this note might be different from the original.  He will check his blood pressures at home.  We discussed controversies regarding guidelines and management of high blood pressure.  Less than 140/90 would at least be reasonable.  I suggested exercise, weight loss, and reasonable limitation of caffeine and salt.  If his blood pressures trend higher than desirable then we can always adjust the dose of hydralazine.  Orders:  -     CARD ECHO 2D DOPPLER (CPT=93306); Future; Expected date: 07/15/2025  5. Mixed hyperlipidemia  -     Lipid Panel; Future; Expected date: 07/15/2025  6. Morbid (severe) obesity due to excess calories (HCC)  7. Obstructive sleep apnea syndrome  Overview:  Formatting of this note might be different from the original.  sent to pul med 2017  Formatting of this note might be different from the original.  sent to pul med 2017  Formatting of this note might be different from the original.  sent to pul med 2017  8. Paroxysmal atrial fibrillation (HCC)  9. 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  Primary osteoarthritis of both knees  11. Pulmonary hypertension, primary (HCC)  12. Stage 3b chronic kidney disease (HCC)  13. Subclinical hypothyroidism  14. Prostate cancer screening  -     PSA Total, Screen; Future; Expected date: 07/15/2025  15. Encounter for annual health examination            Abdominal aortic aneurysm (AAA) -stable. continue control of cad risk factors. recheck CTA abd next visit     Atherosclerosis of coronary artery-stable. continue control of cad risk factors. Cont moderate intense dose of statin     Essential hypertension-controlled.. cont current med plan     Hyperlipidemia-controlled. Cont current med therapy     Obstructive sleep apnea syndrome- refer to sleep service for titration     Paroxysmal atrial fibrillation (HCC)-rate controlled. On DOAC. Refer to cardiology     Primary osteoarthritis of both knees- stable on meloxicam. Refer to ortho     Primary osteoarthritis of right hand- stable. Refer to ortho hand for contracture therapy    The patient indicates understanding of these issues and agrees to the plan.  Continue with current treatment plan.  Lab work ordered.  Reinforced healthy diet, lifestyle, and exercise.      Return in about 6 months (around 1/15/2026).     Hayder John MD, 7/14/2025     Supplementary Documentation:   General Health:  In the past six months, have you lost more than 10 pounds without trying?: 2 - No  Has your appetite been poor?: No  Type of Diet: Other  How does the patient maintain a good energy level?: Other  How would you describe your daily physical activity?: Light  How would you describe your current health state?: Good  How do you maintain positive mental well-being?: Social Interaction, Visiting Friends, Visiting Family  On a scale of 0 to 10, with 0 being no pain and 10 being severe pain, what is your pain level?: 0 - (None)  In the past six months, have you experienced urine leakage?: 0-No  At any time do you feel concerned for the  safety/well-being of yourself and/or your children, in your home or elsewhere?: No  Have you had any immunizations at another office such as Influenza, Hepatitis B, Tetanus, or Pneumococcal?: Yes    Health Maintenance   Topic Date Due    Annual Depression Screening  01/01/2025    Annual Physical  07/26/2025    Influenza Vaccine (1) 10/01/2025    PSA  07/26/2026    Colorectal Cancer Screening  06/07/2028    Fall Risk Screening (Annual)  Completed    Pneumococcal Vaccine: 50+ Years  Completed    Zoster Vaccines  Completed    COVID-19 Vaccine  Completed    Meningococcal B Vaccine  Aged Out            [1]   Patient Active Problem List  Diagnosis    Ectatic abdominal aorta    Atherosclerosis of coronary artery    Essential hypertension    Hyperlipidemia    Obstructive sleep apnea syndrome    Paroxysmal atrial fibrillation (HCC)    Primary osteoarthritis of both knees    Subclinical hypothyroidism    Hepatitis C antibody test negative    Morbid (severe) obesity due to excess calories (HCC)    Pulmonary hypertension, primary (HCC)    Stage 3b chronic kidney disease (HCC)    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    Immune Globulin, Human, (GAMMAGARD S/D LESS IGA) 5 g Intravenous Recon Soln Gammagard S-D (IgA < 1 mcg/mL) 5 gram intravenous solution, [RxNorm: 1591393]    Doxycycline Hyclate 100 MG Oral Tab Take 1 tablet (100 mg total) by mouth daily.    HYDROcodone-acetaminophen 5-325 MG Oral Tab Take 1 tablet by mouth every 6 (six) hours as needed.    Zaleplon 5 MG Oral Cap Take 1 capsule (5 mg total) by mouth nightly.    diphenhydrAMINE 25 MG Oral Cap Take 1 capsule (25 mg total) by mouth every 6 (six) hours as needed for Itching.     gabapentin 300 MG Oral Cap TAKE 3 CAPSULES BY MOUTH IN THE MORNING, AT NOON AND AT BEDTIME    multiple vitamin Oral Chew Tab Chew 1 tablet by mouth daily.    atorvastatin 40 MG Oral Tab Take 1 tablet (40 mg total) by mouth nightly.    celecoxib 200 MG Oral Cap Take 1 capsule (200 mg total) by mouth as needed for Pain.    Coenzyme Q10-Vitamin E (QUNOL ULTRA COQ10 OR) Take 100 mg by mouth daily.    hydrALAZINE 50 MG Oral Tab Take 1 tablet (50 mg total) by mouth every morning. Take with 100mg in AM    labetalol 200 MG Oral Tab Take 1 tablet (200 mg total) by mouth 2 (two) times daily.    hydrALAZINE 100 MG Oral Tab Take 1 tablet (100 mg total) by mouth 3 (three) times daily.    torsemide 20 MG Oral Tab Take 1 tablet (20 mg total) by mouth in the morning and 1 tablet (20 mg total) before bedtime.    spironolactone 25 MG Oral Tab Take 1 tablet (25 mg total) by mouth daily.    apixaban 5 MG Oral Tab Take by mouth 2 (two) times daily.   [3]   Social History  Tobacco Use   Smoking Status Former    Current packs/day: 0.00    Average packs/day: 1 pack/day for 20.0 years (20.0 ttl pk-yrs)    Types: Cigarettes    Start date: 1981    Quit date: 2001    Years since quittin.9    Passive exposure: Never   Smokeless Tobacco Never   Tobacco Comments    Quit 20+ years ago per pt

## 2025-07-15 ENCOUNTER — OFFICE VISIT (OUTPATIENT)
Dept: INTERNAL MEDICINE CLINIC | Facility: CLINIC | Age: 75
End: 2025-07-15
Payer: MEDICARE

## 2025-07-15 ENCOUNTER — LAB ENCOUNTER (OUTPATIENT)
Dept: LAB | Age: 75
End: 2025-07-15
Attending: INTERNAL MEDICINE
Payer: MEDICARE

## 2025-07-15 VITALS
OXYGEN SATURATION: 98 % | WEIGHT: 276 LBS | HEIGHT: 74 IN | DIASTOLIC BLOOD PRESSURE: 70 MMHG | RESPIRATION RATE: 16 BRPM | TEMPERATURE: 98 F | BODY MASS INDEX: 35.42 KG/M2 | HEART RATE: 52 BPM | SYSTOLIC BLOOD PRESSURE: 116 MMHG

## 2025-07-15 DIAGNOSIS — N18.32 STAGE 3B CHRONIC KIDNEY DISEASE (HCC): ICD-10-CM

## 2025-07-15 DIAGNOSIS — E78.2 MIXED HYPERLIPIDEMIA: ICD-10-CM

## 2025-07-15 DIAGNOSIS — I48.0 PAROXYSMAL ATRIAL FIBRILLATION (HCC): ICD-10-CM

## 2025-07-15 DIAGNOSIS — I25.10 ATHEROSCLEROSIS OF NATIVE CORONARY ARTERY OF NATIVE HEART WITHOUT ANGINA PECTORIS: ICD-10-CM

## 2025-07-15 DIAGNOSIS — I10 ESSENTIAL HYPERTENSION: ICD-10-CM

## 2025-07-15 DIAGNOSIS — Z12.5 PROSTATE CANCER SCREENING: ICD-10-CM

## 2025-07-15 DIAGNOSIS — I77.811 ECTATIC ABDOMINAL AORTA: ICD-10-CM

## 2025-07-15 DIAGNOSIS — G47.33 OBSTRUCTIVE SLEEP APNEA SYNDROME: ICD-10-CM

## 2025-07-15 DIAGNOSIS — E66.01 MORBID (SEVERE) OBESITY DUE TO EXCESS CALORIES (HCC): ICD-10-CM

## 2025-07-15 DIAGNOSIS — M17.0 PRIMARY OSTEOARTHRITIS OF BOTH KNEES: ICD-10-CM

## 2025-07-15 DIAGNOSIS — Z00.00 ANNUAL PHYSICAL EXAM: Primary | ICD-10-CM

## 2025-07-15 DIAGNOSIS — I27.0 PULMONARY HYPERTENSION, PRIMARY (HCC): ICD-10-CM

## 2025-07-15 DIAGNOSIS — Z00.00 ENCOUNTER FOR ANNUAL HEALTH EXAMINATION: ICD-10-CM

## 2025-07-15 DIAGNOSIS — G61.89 POLYNEUROPATHY ASSOCIATED WITH MONOCLONAL IGM ANTIBODIES TO MYELIN-ASSOCIATED GLYCOPROTEIN (HCC): ICD-10-CM

## 2025-07-15 DIAGNOSIS — E03.8 SUBCLINICAL HYPOTHYROIDISM: ICD-10-CM

## 2025-07-15 LAB
CHOLEST SERPL-MCNC: 132 MG/DL (ref ?–200)
COMPLEXED PSA SERPL-MCNC: 1.02 NG/ML (ref ?–4)
FASTING PATIENT LIPID ANSWER: NO
HDLC SERPL-MCNC: 46 MG/DL (ref 40–59)
LDLC SERPL CALC-MCNC: 66 MG/DL (ref ?–100)
NONHDLC SERPL-MCNC: 86 MG/DL (ref ?–130)
TRIGL SERPL-MCNC: 111 MG/DL (ref 30–149)
VLDLC SERPL CALC-MCNC: 17 MG/DL (ref 0–30)

## 2025-07-15 PROCEDURE — G0439 PPPS, SUBSEQ VISIT: HCPCS | Performed by: INTERNAL MEDICINE

## 2025-07-15 PROCEDURE — 99499 UNLISTED E&M SERVICE: CPT | Performed by: INTERNAL MEDICINE

## 2025-07-15 PROCEDURE — 36415 COLL VENOUS BLD VENIPUNCTURE: CPT

## 2025-07-15 PROCEDURE — 80061 LIPID PANEL: CPT

## 2025-07-18 ENCOUNTER — OFFICE VISIT (OUTPATIENT)
Age: 75
End: 2025-07-18
Attending: Other
Payer: MEDICARE

## 2025-07-18 VITALS
DIASTOLIC BLOOD PRESSURE: 62 MMHG | HEART RATE: 47 BPM | WEIGHT: 275 LBS | HEIGHT: 74.02 IN | RESPIRATION RATE: 18 BRPM | OXYGEN SATURATION: 95 % | SYSTOLIC BLOOD PRESSURE: 123 MMHG | TEMPERATURE: 97 F | BODY MASS INDEX: 35.29 KG/M2

## 2025-07-18 DIAGNOSIS — G61.89 POLYNEUROPATHY ASSOCIATED WITH MONOCLONAL IGM ANTIBODIES TO MYELIN-ASSOCIATED GLYCOPROTEIN (HCC): Primary | ICD-10-CM

## 2025-07-18 RX ORDER — ACETAMINOPHEN 325 MG/1
650 TABLET ORAL ONCE
Status: COMPLETED | OUTPATIENT
Start: 2025-07-18 | End: 2025-07-18

## 2025-07-18 RX ORDER — DIPHENHYDRAMINE HCL 25 MG
25 CAPSULE ORAL ONCE
Status: COMPLETED | OUTPATIENT
Start: 2025-07-18 | End: 2025-07-18

## 2025-07-18 RX ORDER — ACETAMINOPHEN 325 MG/1
650 TABLET ORAL ONCE
OUTPATIENT
Start: 2025-08-15

## 2025-07-18 RX ORDER — DIPHENHYDRAMINE HCL 25 MG
25 CAPSULE ORAL ONCE
OUTPATIENT
Start: 2025-08-15

## 2025-07-18 RX ORDER — DIPHENHYDRAMINE HYDROCHLORIDE 50 MG/ML
25 INJECTION, SOLUTION INTRAMUSCULAR; INTRAVENOUS ONCE
OUTPATIENT
Start: 2025-08-15

## 2025-07-18 RX ADMIN — DIPHENHYDRAMINE HCL 25 MG: 25 MG CAPSULE ORAL at 09:25:00

## 2025-07-18 RX ADMIN — ACETAMINOPHEN 650 MG: 325 TABLET ORAL at 09:25:00

## 2025-07-18 NOTE — PROGRESS NOTES
Pt here for IVIG infusion. Pt recently had 4th knee replacement and is currently on home IV antibiotics.      Ordering Provider: Kenny  Order Exp: 5/6 doses remaining     Pt tolerated infusion without difficulty or complaint. Reviewed next appt date/time. Discharged home in stable condition with PICC line in place.       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

## 2025-07-23 ENCOUNTER — HOSPITAL ENCOUNTER (OUTPATIENT)
Dept: CV DIAGNOSTICS | Facility: HOSPITAL | Age: 75
Discharge: HOME | End: 2025-07-23
Attending: INTERNAL MEDICINE

## 2025-07-23 DIAGNOSIS — I10 ESSENTIAL HYPERTENSION: ICD-10-CM

## 2025-07-23 PROBLEM — I35.1 NONRHEUMATIC AORTIC VALVE INSUFFICIENCY: Status: ACTIVE | Noted: 2025-07-23

## 2025-07-23 PROCEDURE — 93306 TTE W/DOPPLER COMPLETE: CPT | Performed by: INTERNAL MEDICINE

## 2025-08-10 ENCOUNTER — PATIENT MESSAGE (OUTPATIENT)
Dept: INTERNAL MEDICINE CLINIC | Facility: CLINIC | Age: 75
End: 2025-08-10

## 2025-08-15 ENCOUNTER — OFFICE VISIT (OUTPATIENT)
Facility: LOCATION | Age: 75
End: 2025-08-15
Attending: Other

## 2025-08-15 VITALS
SYSTOLIC BLOOD PRESSURE: 124 MMHG | WEIGHT: 273.31 LBS | HEART RATE: 45 BPM | OXYGEN SATURATION: 94 % | RESPIRATION RATE: 18 BRPM | BODY MASS INDEX: 35.08 KG/M2 | HEIGHT: 74.02 IN | DIASTOLIC BLOOD PRESSURE: 48 MMHG | TEMPERATURE: 98 F

## 2025-08-15 DIAGNOSIS — G61.89 POLYNEUROPATHY ASSOCIATED WITH MONOCLONAL IGM ANTIBODIES TO MYELIN-ASSOCIATED GLYCOPROTEIN (HCC): Primary | ICD-10-CM

## 2025-08-15 RX ORDER — ACETAMINOPHEN 325 MG/1
650 TABLET ORAL ONCE
OUTPATIENT
Start: 2025-09-12

## 2025-08-15 RX ORDER — ACETAMINOPHEN 325 MG/1
650 TABLET ORAL ONCE
Status: COMPLETED | OUTPATIENT
Start: 2025-08-15 | End: 2025-08-15

## 2025-08-15 RX ORDER — DIPHENHYDRAMINE HCL 25 MG
25 CAPSULE ORAL ONCE
Status: COMPLETED | OUTPATIENT
Start: 2025-08-15 | End: 2025-08-15

## 2025-08-15 RX ORDER — DIPHENHYDRAMINE HCL 25 MG
25 CAPSULE ORAL ONCE
OUTPATIENT
Start: 2025-09-12

## 2025-08-15 RX ORDER — DIPHENHYDRAMINE HYDROCHLORIDE 50 MG/ML
25 INJECTION, SOLUTION INTRAMUSCULAR; INTRAVENOUS ONCE
OUTPATIENT
Start: 2025-09-12

## 2025-08-15 RX ADMIN — ACETAMINOPHEN 650 MG: 325 TABLET ORAL at 09:31:00

## 2025-08-15 RX ADMIN — DIPHENHYDRAMINE HCL 25 MG: 25 MG CAPSULE ORAL at 09:31:00

## 2025-08-21 RX ORDER — CETIRIZINE HYDROCHLORIDE 10 MG/1
10 TABLET ORAL DAILY
COMMUNITY

## 2025-08-21 RX ORDER — GUAIFENESIN 600 MG/1
600 TABLET, EXTENDED RELEASE ORAL AS NEEDED
COMMUNITY

## 2025-08-21 RX ORDER — ACETAMINOPHEN 500 MG
1000 TABLET ORAL EVERY 6 HOURS PRN
COMMUNITY

## 2025-08-25 ENCOUNTER — HOSPITAL ENCOUNTER (OUTPATIENT)
Dept: CV DIAGNOSTICS | Facility: HOSPITAL | Age: 75
Discharge: HOME | End: 2025-08-25
Attending: INTERNAL MEDICINE

## 2025-08-25 ENCOUNTER — HOSPITAL ENCOUNTER (OUTPATIENT)
Dept: INTERVENTIONAL RADIOLOGY/VASCULAR | Facility: HOSPITAL | Age: 75
Discharge: HOME | End: 2025-08-25
Attending: INTERNAL MEDICINE | Admitting: INTERNAL MEDICINE

## 2025-08-25 VITALS
RESPIRATION RATE: 19 BRPM | HEIGHT: 74 IN | OXYGEN SATURATION: 97 % | TEMPERATURE: 97 F | SYSTOLIC BLOOD PRESSURE: 128 MMHG | DIASTOLIC BLOOD PRESSURE: 62 MMHG | BODY MASS INDEX: 35.42 KG/M2 | HEART RATE: 44 BPM | WEIGHT: 276 LBS

## 2025-08-25 DIAGNOSIS — I38 ENDOCARDITIS: ICD-10-CM

## 2025-08-25 PROCEDURE — 93325 DOPPLER ECHO COLOR FLOW MAPG: CPT | Performed by: INTERNAL MEDICINE

## 2025-08-25 PROCEDURE — 99152 MOD SED SAME PHYS/QHP 5/>YRS: CPT | Performed by: INTERNAL MEDICINE

## 2025-08-25 PROCEDURE — 93320 DOPPLER ECHO COMPLETE: CPT | Performed by: INTERNAL MEDICINE

## 2025-08-25 PROCEDURE — 93312 ECHO TRANSESOPHAGEAL: CPT | Performed by: INTERNAL MEDICINE

## 2025-08-25 RX ORDER — MIDAZOLAM HYDROCHLORIDE 1 MG/ML
INJECTION INTRAMUSCULAR; INTRAVENOUS
Status: COMPLETED
Start: 2025-08-25 | End: 2025-08-25

## 2025-08-25 RX ORDER — SODIUM CHLORIDE 9 MG/ML
INJECTION, SOLUTION INTRAVENOUS
Status: DISCONTINUED | OUTPATIENT
Start: 2025-08-26 | End: 2025-08-25

## 2025-08-26 ENCOUNTER — OFFICE VISIT (OUTPATIENT)
Facility: LOCATION | Age: 75
End: 2025-08-26
Attending: Other

## 2025-08-26 VITALS
DIASTOLIC BLOOD PRESSURE: 54 MMHG | HEART RATE: 52 BPM | HEIGHT: 74.02 IN | BODY MASS INDEX: 35.34 KG/M2 | TEMPERATURE: 97 F | WEIGHT: 275.38 LBS | OXYGEN SATURATION: 96 % | SYSTOLIC BLOOD PRESSURE: 124 MMHG | RESPIRATION RATE: 18 BRPM

## 2025-08-26 DIAGNOSIS — N62 GYNECOMASTIA: Primary | ICD-10-CM

## 2025-08-26 RX ORDER — CELECOXIB 100 MG/1
CAPSULE ORAL
COMMUNITY
Start: 2025-08-15

## (undated) DIAGNOSIS — M17.0 PRIMARY OSTEOARTHRITIS OF BOTH KNEES: Primary | ICD-10-CM

## (undated) DIAGNOSIS — I10 ESSENTIAL HYPERTENSION: Primary | ICD-10-CM

## (undated) DIAGNOSIS — I10 ESSENTIAL HYPERTENSION: ICD-10-CM

## (undated) DEVICE — TIP COVER ACCESSORY

## (undated) DEVICE — ADHESIVE MASTISOL 2/3CC VL

## (undated) DEVICE — Device

## (undated) DEVICE — FENESTRATED BIPOLAR FORCEPS: Brand: ENDOWRIST

## (undated) DEVICE — MONOPOLAR CURVED SCISSORS: Brand: ENDOWRIST

## (undated) DEVICE — ROBOTIC GENERAL: Brand: MEDLINE INDUSTRIES, INC.

## (undated) DEVICE — CANNULA SEAL

## (undated) DEVICE — MEGA SUTURECUT ND: Brand: ENDOWRIST

## (undated) DEVICE — SUTURE ABSORB STRAT 23CML 26MM

## (undated) DEVICE — COVER WAND RF DETECT

## (undated) DEVICE — STERILE SYNTHETIC POLYISOPRENE POWDER-FREE SURGICAL GLOVES WITH HYDROGEL COATING: Brand: PROTEXIS

## (undated) DEVICE — TROCAR: Brand: KII FIOS FIRST ENTRY

## (undated) DEVICE — GAUZE SPONGES,USP TYPE VII GAUZE, 12 PLY: Brand: CURITY

## (undated) DEVICE — SUTURE VICRYL 0

## (undated) DEVICE — 3M™ STERI-STRIP™ REINFORCED ADHESIVE SKIN CLOSURES, R1547, 1/2 IN X 4 IN (12 MM X 100 MM), 6 STRIPS/ENVELOPE: Brand: 3M™ STERI-STRIP™

## (undated) DEVICE — ARM DRAPE

## (undated) DEVICE — COLUMN DRAPE

## (undated) DEVICE — 3M(TM) TEGADERM(TM) TRANSPARENT FILM DRESSING FRAME STYLE 9505W: Brand: 3M™ TEGADERM™

## (undated) DEVICE — VISUALIZATION SYSTEM: Brand: CLEARIFY

## (undated) DEVICE — SUTURE STRATAFIX 60CML 36MML

## (undated) DEVICE — SUTURE ABSORB STRATAFIX 12IN

## (undated) DEVICE — SUTURE MONOCRYL 4-0 PS-2

## (undated) DEVICE — BLADELESS OBTURATOR: Brand: WECK VISTA

## (undated) DEVICE — 40580 - THE PINK PAD - ADVANCED TRENDELENBURG POSITIONING KIT: Brand: 40580 - THE PINK PAD - ADVANCED TRENDELENBURG POSITIONING KIT

## (undated) NOTE — LETTER
22    Patient: Sathya Wang  : 1950 Visit date: 2022    Dear  Emily He MD    Thank you for referring Sathya Wang to my practice. Please find my assessment and plan below. Extremities:    Right lower extremity-  Thigh-no significant varicosities ventral hernia  Calf-   no significant varicosities are noted however in the distal calf there is hyperpigmentation noted. There is 1-2+ pitting edema  Left lower extremity-      Thigh-no significant varicosities are hydrated  Calf-again no significant however there is again protuberant varicosities are noted hyperpigmentation in the distal calf and also also 1-2 pitting edema in the anterior tibial region and ankle  Bilaterally there is no evidence of chronic venous stasis dermatitis, hyperpigmentation or venous stasis ulcers. History of Present Illness  Jaziel Gist is a 68-year-old male who presents to clinic for evaluation of lower extremity swelling and discoloration. The patient was referred by his PCP, Dr. Sherrie Gray. The patient reports discoloration of his lower extremities that have been present for the past year. He reports onset of bilateral lower extremity swelling that began 2 months ago. The patient denies associated pain, heaviness, or fatigue. The patient states his PCP has been adjusting his hydrochlorothiazide. The patient denies a history of DVTs or lower extremity trauma. He denies hormone replacemen use. The patient states he has used compression stockings with improvement in his lower extremity edema. The patient reports a family history of varicose veins in his mother. The patient underwent a venous insufficiency ultrasound on 2022. Ultrasound shows no evidence of incompetence or valvular dysfunction in the bilateral greater saphenous vein or bilateral lesser saphenous vein. Deep venous system was also found to be competent. Varicosities of 3 mm and 5 mm are identified.     The patient has a past medical history of paroxysmal A. fib and is on Eliquis. He reports history of hypertension, abdominal aortic aneurysm, coronary artery disease, obstructive sleep apnea, and hyperlipidemia. The patient is a former smoker. He reports occasional alcohol use. He denies drug use. The patient has achieved some transient relief of symptoms with leg elevation and the use of compression stockings. We did discuss that given the ultrasound results without any evidence of incompetence or valvular dysfunction, the patient's lower extremity edema may be related to other causes. There is no indication for venous ablation in this patient with no documented insufficiency or reflux    Assessment   No diagnosis found. Plan     Lenora may benefit from the use of compression stockings to minimize edema.    We did discuss knee advantage of weight management, exercise and leg elevation to minimize symptoms as well   He will follow-up with his PCP to discuss other etiologies of peripheral edema          Sincerely,       Zachary To MD   CC: No Recipients

## (undated) NOTE — LETTER
2024    [facility]    Re: Wil Llanos  : 1950      Dear Dr. Dixon:    I write to you in regards to my patient, Wil Llanos, who is scheduled to undergo surgery in the near future. Based on my assessment, Wil has no active neurologic conditions that would prevent him from undergoing surgery. Wil should take his medications as prescribed. This patient may proceed with surgery at an acceptable neurological risk.     Please do not hesitate to call with any questions.    Sincerely,            Mode Cox MD  [address] [id number] [phone]

## (undated) NOTE — LETTER
OUTSIDE TESTING RESULT REQUEST     IMPORTANT: FOR YOUR IMMEDIATE ATTENTION  Please FAX all test results listed below to: [phone]     Testing already done on or about: July or August 2021    * * * * If testing is NOT complete, arrange with patient A. S.

## (undated) NOTE — LETTER
[address]  [address] [address] [address]  [phone]  [phone]  Authorization for Imaging Procedure  Date of Procedure: 11/2/2023    I hereby authorize Dr. Kat Orantes , my physician and his/her assistants (if applicable), which may include medical students, residents, and/or fellows, to perform the following procedure and administer such anesthesia as may be determined necessary by my physician: XR LUMBAR PUNCTURE DIAG, [address] ([id number]) on [employer]. 2.  I recognize that during the procedure, unforeseen conditions may necessitate additional or different procedures than those listed above. I, therefore, further authorize and request that the above-named physician, assistants, or designees perform such procedures as are, in their judgment, necessary and desirable. 3.  My physician has discussed prior to my procedure the potential benefits, risks and side effects of this procedure; the likelihood of achieving goals; and potential problems that might occur during recuperation. They also discussed reasonable alternatives to the procedure, including risks, benefits, and side effects related to the alternatives and risks related to not receiving this procedure. I have had all my questions answered and I acknowledge that no guarantee has been made as to the result that may be obtained. 4.  Should the need arise during my procedure, which includes change of level of care prior to discharge, I also consent to the administration of blood and/or blood products. Further, I understand that despite careful testing and screening of blood or blood products by collecting agencies, I may still be subject to ill effects as a result of receiving a blood transfusion and/or blood products.  The following are some, but not all, of the potential risks that can occur: fever and allergic reactions, hemolytic reactions, transmission of diseases such as Hepatitis, AIDS and Cytomegalovirus (CMV) and fluid overload. In the event that I wish to have an autologous transfusion of my own blood, or a directed donor transfusion, I will discuss this with my physician. Check only if Refusing Blood or Blood Products  I understand refusal of blood or blood products as deemed necessary by my physician may have serious consequences to my condition to include possible death. I hereby assume responsibility for my refusal and release the hospital, its personnel, and my physicians from any responsibility for the consequences of my refusal.   [  ] Patient Refuses Blood      5. I authorize the use of any specimen, organs, tissues, body parts or foreign objects that may be removed from my body during the procedure for diagnosis, research or teaching purposes and their subsequent disposal by hospital authorities. I also authorize the release of specimen test results and/or written reports to my treating physician on the hospital medical staff or other referring or consulting physicians involved in my care, at the discretion of the Pathologist or my treating physician. 6.  I consent to the photographing or videotaping of the procedures to be performed, including appropriate portions of my body for medical, scientific, or educational purposes, provided my identity is not revealed by the pictures or by descriptive texts accompanying them. If the procedure has been photographed/videotaped, the physician will obtain the original picture, image, videotape or CD. The hospital will not be responsible for storage, release or maintenance of the picture, image, tape or CD.   7.  I consent to the presence of a  or observers in the operating room as deemed necessary by my physician or their designees. 8.  I recognize that in the event my procedure results in extended X-Ray/fluoroscopy time, I may develop a skin reaction. 9.   If I have a Do Not Attempt Resuscitation (DNAR) order in place, that status will be suspended while in the operating room, procedural suite, and during the recovery period unless otherwise explicitly stated by me (or a person authorized to consent on my behalf). The performing physician or my attending physician will determine when the applicable recovery period ends for purposes of reinstating the DNAR order. 10.  I acknowledge that my physician has explained sedation/analgesia administration to me including the risk and benefits I consent to the administration of sedation/analgesia as may be necessary or desirable in the judgment of my physician. I CERTIFY THAT I HAVE READ AND FULLY UNDERSTAND THE ABOVE CONSENT FOR THE PROCEDURE. Signature of Patient: _____________________________________________________________  Responsible person in case of minor, unconscious: ____________________________________  Relationship to patient:  __________________________________________________________  Signature of Witness: _______________________________Date: _________Time: __________    Statement of Physician: My signature below affirms that prior to the time of the procedure, I have explained to the patient and/or his guardian, the risks and benefits involved in the proposed treatment and any reasonable alternative to the proposed treatment. I have also explained the risks and benefits involved in the refusal of the proposed treatment and have answered the patient's questions. If I have a significant financial interest in a co-management agreement or a significant financial interest in any product or implant, or other significant relationship used in the procedure/surgery, I have disclosed this and had a discussion with my patient.   Signature of Physician:   _________________________________Date:_____________Time:________    Patient Name: Terramaryam Petersluis : 1950  Printed: 2023   Medical Record #: [id number]